# Patient Record
Sex: MALE | Race: WHITE | NOT HISPANIC OR LATINO | Employment: UNEMPLOYED | URBAN - METROPOLITAN AREA
[De-identification: names, ages, dates, MRNs, and addresses within clinical notes are randomized per-mention and may not be internally consistent; named-entity substitution may affect disease eponyms.]

---

## 2017-01-16 DIAGNOSIS — G44.209 TENSION HEADACHE: ICD-10-CM

## 2017-01-16 DIAGNOSIS — M54.2 NECK PAIN: Primary | ICD-10-CM

## 2017-01-17 RX ORDER — BUTALBITAL, ACETAMINOPHEN AND CAFFEINE 50; 325; 40 MG/1; MG/1; MG/1
1 TABLET ORAL EVERY 4 HOURS PRN
Qty: 15 TABLET | Refills: 3
Start: 2017-01-17 | End: 2017-01-23

## 2017-01-23 RX ORDER — BUTALBITAL, ACETAMINOPHEN AND CAFFEINE 50; 325; 40 MG/1; MG/1; MG/1
1 TABLET ORAL EVERY 4 HOURS PRN
Qty: 60 TABLET | Refills: 0 | Status: SHIPPED
Start: 2017-01-23 | End: 2017-07-13

## 2017-03-17 ENCOUNTER — OFFICE VISIT (OUTPATIENT)
Dept: PSYCHIATRY | Facility: CLINIC | Age: 57
End: 2017-03-17
Attending: CLINICAL NURSE SPECIALIST
Payer: MEDICARE

## 2017-03-17 VITALS
WEIGHT: 191 LBS | HEART RATE: 94 BPM | DIASTOLIC BLOOD PRESSURE: 84 MMHG | BODY MASS INDEX: 29.04 KG/M2 | SYSTOLIC BLOOD PRESSURE: 138 MMHG

## 2017-03-17 DIAGNOSIS — F41.1 ANXIETY STATE: Primary | ICD-10-CM

## 2017-03-17 DIAGNOSIS — F34.1 DYSTHYMIA: ICD-10-CM

## 2017-03-17 PROCEDURE — 99212 OFFICE O/P EST SF 10 MIN: CPT | Mod: ZF

## 2017-03-17 RX ORDER — DIAZEPAM 10 MG
10 TABLET ORAL 3 TIMES DAILY
Qty: 90 TABLET | Refills: 5 | Status: SHIPPED | OUTPATIENT
Start: 2017-03-17 | End: 2017-09-15

## 2017-03-17 NOTE — PROGRESS NOTES
"  Outpatient Psychiatry Progress Note     Provider: BRENT Payne CNS  Date: 3/17/2017  Service:  Medication follow up with counseling.   Patient Identification: Lul Arriola  : 1960   MRN: [id number]    Lul Arriola is a 56 year old year old male who presents for ongoing psychiatric care.  Lul Arriola was last seen in clinic on 16.   At that time,   Assessment & Plan      Lul Arriola is seen today for follow up and reports he continues to be very stressed, not really sure that Valium is helping but wants to continue it since concerned he would feel worse. Continues with limited insight into his behavior and affect on health care. Given this the use of Valium is some value in ability to maintain some interactions without danger to the patient.     Diagnosis  Axis 1: Anxiety NOS, Dysthymia  Axis 2: none  Axis 3: See problem list in the medical record     Plan:  Medication: Continue current medications  OTC Recommendations: none  Lab Orders: none  Referrals: none  Release of Information: none  Future Treatment Considerations:per patient agreement to try alternative  Return for Follow Up:6 months      3/17/2017  Today Lul reports he is feeling about the same \" hanging in there\". Didn't get money for  cost but has gotten past that.   He does have some winter blues which happens every year.  Still going through his mother's stuff because she has so much stuff. Mom passed away in .   Side effects of medication include: none reported  Psychiatric Review of Systems:  The patient endorses symptoms of depression: In the last 2 weeks per PHQ 9 several days feeling depressed, fatigue.  He  patient endorses symptoms of anxiety : stable  He endorses symptoms of jose including none.    He endorses symptoms of psychosis including no psychotic symptoms.       Review of Medical Systems:  Sleep: stable  Energy: mild  Concentration: no concerns  Appetite: stable  GI " Concerns: no new concerns  Cardiac concerns: no new concerns  Neurological concerns: no new concerns  Other medical concerns: no new concerns  Current Substance Use:  Alcohol:denies  Other drugs:none  Caffeine:no change - one cup coffee or tea  Nicotine: vaps - quit tobacco about a year ago.  Past Medical History: No past medical history on file.  Patient Active Problem List   Diagnosis     Anxiety state     Dysthymia       Allergies:   Allergies   Allergen Reactions     Ultram [Tramadol] Nausea     Baclofen      Imitrex [Sumatriptan] Nausea     Ivp Dye [Contrast Dye] Nausea and Vomiting     N & V, eyes, nose, throat swelled as a child     Keflex [Cephalexin Hcl]      Remeron Soltab      Valtrex [Valacyclovir] Other (See Comments)     Stomach pains          Current Medications     Current Outpatient Prescriptions Ordered in [facility]   Medication Sig Dispense Refill     butalbital-acetaminophen-caffeine (FIORICET/ESGIC) -40 MG per tablet Take 1 tablet by mouth every 4 hours as needed for pain 60 tablet 0     diazepam (VALIUM) 10 MG tablet Take 1 tablet (10 mg) by mouth 3 times daily 90 tablet 5     cyclobenzaprine (FLEXERIL) 10 MG tablet Take 1 tablet (10 mg) by mouth 3 times daily as needed for muscle spasms 90 tablet 5     fluticasone (FLONASE) 50 MCG/ACT nasal spray Spray 2 sprays into both nostrils every 12 hours 16 g 11     acyclovir (ZOVIRAX) 200 MG capsule Take one po tid 270 capsule 3     methylPREDNISolone (MEDROL DOSEPAK) 4 MG tablet Follow package instructions 21 tablet 0     Aspirin-Acetaminophen-Caffeine (EXCEDRIN PO) Take by mouth as needed       MOTRIN IB PO Take by mouth as needed for moderate pain       sildenafil (VIAGRA) 100 MG tablet Take 0.5-1 tablets ( mg) by mouth daily as needed for erectile dysfunction Take 30 min to 4 hours before intercourse.  Never use with nitroglycerin, terazosin or doxazosin. 12 tablet 11     No current [facility]-ordered facility-administered medications on file.      "    Mental Status Exam     Appearance:  Casually dressed and Well groomed  Behavior/relationship to examiner/demeanor:  Cooperative  Orientation: Oriented to person, place, time and situation  Psychomotor: normal form  Speech Rate:  Normal  Speech Spontaneity:  Normal  Mood:  \"hanging in there\"  Affect:  Appropriate/mood-congruent  Thought Process (Associations):  Goal directed  Thought Content:  no overt psychosis, denies suicidal ideation, intent or thoughts and patient does not appear to be responding to internal stimuli  Abnormal Perception:  None  Attention/Concentration:  Normal  Language:  Intact  Insight:  Adequate  Judgment:  Good      Results     Vital signs: /84  Pulse 94  Wt 86.6 kg (191 lb)  BMI 29.04 kg/m2    Laboratory Data:  no new data reviewed    Assessment & Plan      Lul Arriola is seen today for follow up and reports his mood has been stable with residual depression and anxiety. He would like to continue current medications    Diagnosis  Axis 1: Anxiety, Dysthymia  Axis 2: none  Axis 3: See problem list in the medical record    Plan:  Medication: no change, continue Diazepam at current dose  OTC Recommendations: none  Lab Orders:  none  Referrals: none  Release of Information: none  Future Treatment Considerations:per symptoms, patient agreement to new medication trials  Return for Follow Up:6 months   The risks, benefits, alternatives and side effects have been discussed and are understood by the patient. The patient understands the risks of using street drugs or alcohol. There are no medical contraindications, the patient agrees to treatment, and has the capacity to do so. The patient understands to call 911 or come to the nearest ED if life threatening or urgent symptoms present.  Over 50% of this time was spent counseling the patient and/or coordinating care regarding review of social and occupational functioning.  In addition patient was counseled on health and wellness " practices to augment medication treatment of symptoms. See note for details.    Mary Jane Macario, APRN CNS 3/17/2017

## 2017-03-17 NOTE — MR AVS SNAPSHOT
After Visit Summary   3/17/2017    Lul Arriola    MRN: [id number]           Patient Information     Date Of Birth          1960        Visit Information        Provider Department      3/17/2017 1:15 PM Mary Jane Macario APRN CNS Psychiatry Clinic        Today's Diagnoses     Anxiety state    -  1    Dysthymia           Follow-ups after your visit        Follow-up notes from your care team     Return in about 6 months (around 2017).      Your next 10 appointments already scheduled     Sep 15, 2017  1:15 PM CDT   Adult Med Follow UP with BRENT Payne CNS   Psychiatry Clinic ([facility])    [address]  [address] [facility] [address] [phone]              Who to contact     Please call your clinic at [phone] to:    Ask questions about your health    Make or cancel appointments    Discuss your medicines    Learn about your test results    Speak to your doctor   If you have compliments or concerns about an experience at your clinic, or if you wish to file a complaint, please contact [facility] Patient Relations at [phone] or email us at [email or web address]         Additional Information About Your Visit        MyChart Information     Brainparkt is an electronic gateway that provides easy, online access to your medical records. With [employer], you can request a clinic appointment, read your test results, renew a prescription or communicate with your care team.     To sign up for Brainparkt visit the website at [email or web address]   You will be asked to enter the access code listed below, as well as some personal information. Please follow the directions to create your username and password.     Your access code is: [email or web address]  Expires: 6/15/2017  1:34 PM     Your access code will  in 90 days. If you need help or a new code, please contact your [facility]  [place] [facility] or call [phone] for assistance.        Care EveryWhere ID     This is your Care EveryWhere ID. This could be used by other organizations to access your [place] medical records  [id number]        Your Vitals Were     Pulse BMI (Body Mass Index)                94 29.04 kg/m2           Blood Pressure from Last 3 Encounters:   03/17/17 138/84   09/16/16 151/87   03/25/16 144/88    Weight from Last 3 Encounters:   03/17/17 86.6 kg (191 lb)   09/16/16 86 kg (189 lb 9.6 oz)   03/25/16 79.2 kg (174 lb 9.6 oz)              Today, you had the following     No orders found for display         Where to get your medicines      Some of these will need a paper prescription and others can be bought over the counter.  Ask your nurse if you have questions.     Bring a paper prescription for each of these medications     diazepam 10 MG tablet          Primary Care Provider Office Phone # Fax #    Donald Lorenz [phone]       [facility] [address] [place] [address]        Thank you!     Thank you for choosing PSYCHIATRY CLINIC  for your care. Our goal is always to provide you with excellent care. Hearing back from our patients is one way we can continue to improve our services. Please take a few minutes to complete the written survey that you may receive in the mail after your visit with us. Thank you!             Your Updated Medication List - Protect others around you: Learn how to safely use, store and throw away your medicines at www.disposemymeds.org.          This list is accurate as of: 3/17/17  1:34 PM.  Always use your most recent med list.                   Brand Name Dispense Instructions for use    acyclovir 200 MG capsule    ZOVIRAX    270 capsule    Take one po tid       butalbital-acetaminophen-caffeine -40 MG per tablet    FIORICET/ESGIC    60 tablet    Take 1 tablet by mouth every 4 hours as needed for pain       cyclobenzaprine  10 MG tablet    FLEXERIL    90 tablet    Take 1 tablet (10 mg) by mouth 3 times daily as needed for muscle spasms       diazepam 10 MG tablet    VALIUM    90 tablet    Take 1 tablet (10 mg) by mouth 3 times daily       EXCEDRIN PO      Take by mouth as needed       fluticasone 50 MCG/ACT spray    FLONASE    16 g    Spray 2 sprays into both nostrils every 12 hours       MOTRIN IB PO      Take by mouth as needed for moderate pain

## 2017-03-17 NOTE — NURSING NOTE
Chief Complaint   Patient presents with     Recheck Medication     anxiety      Reviewed allergies, smoking status, and pharmacy preference  Administered abuse screening questions   Obtained weight, blood pressure and heart rate

## 2017-07-13 DIAGNOSIS — J30.9 RHINITIS, ALLERGIC: ICD-10-CM

## 2017-07-13 DIAGNOSIS — G44.209 TENSION HEADACHE: ICD-10-CM

## 2017-07-13 DIAGNOSIS — M54.2 NECK PAIN: ICD-10-CM

## 2017-07-13 RX ORDER — FLUTICASONE PROPIONATE 50 MCG
2 SPRAY, SUSPENSION (ML) NASAL EVERY 12 HOURS
Qty: 16 G | Refills: 0 | Status: SHIPPED | OUTPATIENT
Start: 2017-07-13 | End: 2017-08-16

## 2017-07-13 RX ORDER — BUTALBITAL, ACETAMINOPHEN AND CAFFEINE 50; 325; 40 MG/1; MG/1; MG/1
1 TABLET ORAL EVERY 4 HOURS PRN
Qty: 15 TABLET | Refills: 0
Start: 2017-07-13 | End: 2017-08-16

## 2017-07-13 NOTE — TELEPHONE ENCOUNTER
butalbital-acetaminophen-caffeine (FIORICET/ESGIC) -40 MG per tablet      Last Written Prescription Date:  1/23/2017  Last Fill Quantity: 60,   # refills: 0  Last Office Visit : 3/15/2016  Future Office visit:  none    Routing refill request to provider's nurse per [facility] protocol drug is a controlled substance.  Pended Rx as last filled.

## 2017-07-13 NOTE — TELEPHONE ENCOUNTER
fluticasone (FLONASE) 50 MCG/ACT nasal spray      Last Written Prescription Date:  6/21/2016  Last Fill Quantity: 16 g,   # refills: 11  Last Office Visit : 3/15/2016  Future Office visit:  0    Per protocol, fill sent for 30 days and no refills.  Appointment reminder letter sent.

## 2017-07-13 NOTE — LETTER
[facility]  [address]  [address] [place] [address]      July 13, 2017      Lul Arriola  [address] [address]        Dear Lul,    This letter is a reminder that you are overdue to see your Primary Care Provider for an Annual Visit. You must be seen by your Primary Care Provider on a yearly basis and have appropriate labs drawn for continued care and prescription refills. Please call [phone] to schedule an appointment for an Annual Visit with Dr Donald Lorenz MD.     You have been given a 30 day supply/refill of your fluticasone (FLONASE) 50 MCG/ACT nasal spray while you get your clinic visit/labs completed.      Regards,    Primary Care Center

## 2017-08-16 ENCOUNTER — OFFICE VISIT (OUTPATIENT)
Dept: FAMILY MEDICINE | Facility: CLINIC | Age: 57
End: 2017-08-16

## 2017-08-16 VITALS
OXYGEN SATURATION: 96 % | HEART RATE: 86 BPM | SYSTOLIC BLOOD PRESSURE: 138 MMHG | RESPIRATION RATE: 18 BRPM | BODY MASS INDEX: 28.75 KG/M2 | DIASTOLIC BLOOD PRESSURE: 81 MMHG | WEIGHT: 189.1 LBS

## 2017-08-16 DIAGNOSIS — B00.9 HSV (HERPES SIMPLEX VIRUS) INFECTION: ICD-10-CM

## 2017-08-16 DIAGNOSIS — R61 GENERALIZED HYPERHIDROSIS: ICD-10-CM

## 2017-08-16 DIAGNOSIS — R79.89 ELEVATED BRAIN NATRIURETIC PEPTIDE (BNP) LEVEL: Primary | ICD-10-CM

## 2017-08-16 DIAGNOSIS — I50.20 SYSTOLIC CONGESTIVE HEART FAILURE, UNSPECIFIED CONGESTIVE HEART FAILURE CHRONICITY: ICD-10-CM

## 2017-08-16 DIAGNOSIS — J30.9 CHRONIC ALLERGIC RHINITIS, UNSPECIFIED SEASONALITY, UNSPECIFIED TRIGGER: ICD-10-CM

## 2017-08-16 DIAGNOSIS — G44.209 TENSION HEADACHE: ICD-10-CM

## 2017-08-16 DIAGNOSIS — M54.2 NECK PAIN: ICD-10-CM

## 2017-08-16 DIAGNOSIS — N52.8 OTHER MALE ERECTILE DYSFUNCTION: ICD-10-CM

## 2017-08-16 DIAGNOSIS — R79.89 ELEVATED BRAIN NATRIURETIC PEPTIDE (BNP) LEVEL: ICD-10-CM

## 2017-08-16 LAB
ALBUMIN SERPL-MCNC: 3.7 G/DL (ref 3.4–5)
ALP SERPL-CCNC: 72 U/L (ref 40–150)
ALT SERPL W P-5'-P-CCNC: 23 U/L (ref 0–70)
ANION GAP SERPL CALCULATED.3IONS-SCNC: 9 MMOL/L (ref 3–14)
AST SERPL W P-5'-P-CCNC: 15 U/L (ref 0–45)
BASOPHILS # BLD AUTO: 0 10E9/L (ref 0–0.2)
BASOPHILS NFR BLD AUTO: 0.7 %
BILIRUB SERPL-MCNC: 0.5 MG/DL (ref 0.2–1.3)
BUN SERPL-MCNC: 13 MG/DL (ref 7–30)
CALCIUM SERPL-MCNC: 9.1 MG/DL (ref 8.5–10.1)
CHLORIDE SERPL-SCNC: 107 MMOL/L (ref 94–109)
CHOLEST SERPL-MCNC: 216 MG/DL
CO2 SERPL-SCNC: 23 MMOL/L (ref 20–32)
CREAT SERPL-MCNC: 0.94 MG/DL (ref 0.66–1.25)
DIFFERENTIAL METHOD BLD: NORMAL
EOSINOPHIL # BLD AUTO: 0.1 10E9/L (ref 0–0.7)
EOSINOPHIL NFR BLD AUTO: 1.8 %
ERYTHROCYTE [DISTWIDTH] IN BLOOD BY AUTOMATED COUNT: 12.7 % (ref 10–15)
GFR SERPL CREATININE-BSD FRML MDRD: 83 ML/MIN/1.7M2
GLUCOSE SERPL-MCNC: 113 MG/DL (ref 70–99)
HCT VFR BLD AUTO: 42.1 % (ref 40–53)
HDLC SERPL-MCNC: 46 MG/DL
HGB BLD-MCNC: 13.8 G/DL (ref 13.3–17.7)
IMM GRANULOCYTES # BLD: 0 10E9/L (ref 0–0.4)
IMM GRANULOCYTES NFR BLD: 0.3 %
LDLC SERPL CALC-MCNC: 132 MG/DL
LYMPHOCYTES # BLD AUTO: 1.2 10E9/L (ref 0.8–5.3)
LYMPHOCYTES NFR BLD AUTO: 19.2 %
MCH RBC QN AUTO: 30.7 PG (ref 26.5–33)
MCHC RBC AUTO-ENTMCNC: 32.8 G/DL (ref 31.5–36.5)
MCV RBC AUTO: 94 FL (ref 78–100)
MONOCYTES # BLD AUTO: 0.5 10E9/L (ref 0–1.3)
MONOCYTES NFR BLD AUTO: 8.3 %
NEUTROPHILS # BLD AUTO: 4.3 10E9/L (ref 1.6–8.3)
NEUTROPHILS NFR BLD AUTO: 69.7 %
NONHDLC SERPL-MCNC: 170 MG/DL
NRBC # BLD AUTO: 0 10*3/UL
NRBC BLD AUTO-RTO: 0 /100
NT-PROBNP SERPL-MCNC: 1371 PG/ML (ref 0–125)
PLATELET # BLD AUTO: 204 10E9/L (ref 150–450)
POTASSIUM SERPL-SCNC: 3.8 MMOL/L (ref 3.4–5.3)
PROT SERPL-MCNC: 7.6 G/DL (ref 6.8–8.8)
RBC # BLD AUTO: 4.49 10E12/L (ref 4.4–5.9)
SODIUM SERPL-SCNC: 138 MMOL/L (ref 133–144)
TRIGL SERPL-MCNC: 190 MG/DL
TSH SERPL DL<=0.005 MIU/L-ACNC: 2.39 MU/L (ref 0.4–4)
WBC # BLD AUTO: 6.2 10E9/L (ref 4–11)

## 2017-08-16 RX ORDER — FLUTICASONE PROPIONATE 50 MCG
2 SPRAY, SUSPENSION (ML) NASAL EVERY 12 HOURS
Qty: 16 G | Refills: 5 | Status: SHIPPED | OUTPATIENT
Start: 2017-08-16 | End: 2018-11-28

## 2017-08-16 RX ORDER — BUTALBITAL, ACETAMINOPHEN AND CAFFEINE 50; 325; 40 MG/1; MG/1; MG/1
1 TABLET ORAL EVERY 4 HOURS PRN
Qty: 15 TABLET | Refills: 3 | Status: SHIPPED | OUTPATIENT
Start: 2017-08-16 | End: 2017-12-01

## 2017-08-16 RX ORDER — SILDENAFIL 100 MG/1
50-100 TABLET, FILM COATED ORAL DAILY PRN
Qty: 12 TABLET | Refills: 11 | Status: SHIPPED | OUTPATIENT
Start: 2017-08-16 | End: 2018-11-28

## 2017-08-16 RX ORDER — ACYCLOVIR 200 MG/1
CAPSULE ORAL
Qty: 270 CAPSULE | Refills: 3 | Status: SHIPPED | OUTPATIENT
Start: 2017-08-16 | End: 2018-11-28

## 2017-08-16 RX ORDER — BUTALBITAL, ACETAMINOPHEN AND CAFFEINE 50; 325; 40 MG/1; MG/1; MG/1
1 TABLET ORAL EVERY 4 HOURS PRN
Qty: 15 TABLET | Refills: 0 | Status: SHIPPED | OUTPATIENT
Start: 2017-08-16 | End: 2017-08-16

## 2017-08-16 RX ORDER — FLUTICASONE PROPIONATE 50 MCG
2 SPRAY, SUSPENSION (ML) NASAL EVERY 12 HOURS
Qty: 16 G | Refills: 0 | Status: SHIPPED | OUTPATIENT
Start: 2017-08-16 | End: 2017-08-16

## 2017-08-16 RX ORDER — CYCLOBENZAPRINE HCL 10 MG
10 TABLET ORAL 3 TIMES DAILY PRN
Qty: 90 TABLET | Refills: 5 | Status: SHIPPED | OUTPATIENT
Start: 2017-08-16 | End: 2018-11-28

## 2017-08-16 ASSESSMENT — PAIN SCALES - GENERAL: PAINLEVEL: MODERATE PAIN (4)

## 2017-08-16 NOTE — PATIENT INSTRUCTIONS
[facility] Medication Refill Request Information:  * Please contact your pharmacy regarding ANY request for medication refills.  ** [facility] Prescription Fax = [phone]  * Please allow 3 business days for routine medication refills.  * Please allow 5 business days for controlled substance medication refills.     [facility] Test Result notification information:  *You will be notified with in 7-10 days of your appointment day regarding the results of your test.  If you are on MyChart you will be notified as soon as the provider has reviewed the results and signed off on them.    [facility] [phone]

## 2017-08-16 NOTE — MR AVS SNAPSHOT
After Visit Summary   8/16/2017    Lul Arriola    MRN: [id number]           Patient Information     Date Of Birth          1960        Visit Information        Provider Department      8/16/2017 1:35 PM Donald Lorenz MD [facility]        Today's Diagnoses     Elevated brain natriuretic peptide (BNP) level    -  1    Generalized hyperhidrosis        Systolic congestive heart failure, unspecified congestive heart failure chronicity (H)        HSV (herpes simplex virus) infection        Neck pain        Chronic allergic rhinitis, unspecified seasonality, unspecified trigger        Tension headache        Other male erectile dysfunction          Care Instructions    [facility] Medication Refill Request Information:  * Please contact your pharmacy regarding ANY request for medication refills.  ** [facility] Prescription Fax = [phone]  * Please allow 3 business days for routine medication refills.  * Please allow 5 business days for controlled substance medication refills.     [facility] Test Result notification information:  *You will be notified with in 7-10 days of your appointment day regarding the results of your test.  If you are on MyChart you will be notified as soon as the provider has reviewed the results and signed off on them.    Primary Care Center [phone]             Follow-ups after your visit        Additional Services     CARDIOLOGY EVAL ADULT REFERRAL       Your provider has referred you to:  [facility]: [facility] [phone]   [email or web address]    Please be aware that coverage of these services is subject to the terms and limitations of your health insurance plan.  Call member services at your health plan with any benefit or coverage questions.      Type of Referral:  New Cardiology Consult    Timeframe requested:  Within 1  month    Please bring the following to your appointment:  >>   Any x-rays, CTs or MRIs which have been performed.  Contact the facility where they were done to arrange for  prior to your scheduled appointment.    >>   List of current medications  >>   This referral request   >>   Any documents/labs given to you for this referral                  Follow-up notes from your care team     Return in about 6 months (around 2/16/2018).      Your next 10 appointments already scheduled     Aug 16, 2017  2:15 PM CDT   LAB with [facility] ([facility])    [address] [address] [phone]           Patient must bring picture ID. Patient should be prepared to give a urine specimen  Please do not eat 10-12 hours before your appointment if you are coming in fasting for labs on lipids, cholesterol, or glucose (sugar). Pregnant women should follow their Care Team instructions. Water with medications is okay. Do not drink coffee or other fluids. If you have concerns about taking  your medications, please ask at office or if scheduling via Qbaka, send a message by clicking on Secure Messaging, Message Your Care Team.            Sep 15, 2017  1:15 PM CDT   Adult Med Follow UP with BRENT Payne CNS   Psychiatry Clinic ([facility])    [place] [id number] [facility] [address] [phone]              Future tests that were ordered for you today     Open Future Orders        Priority Expected Expires Ordered    CBC with platelets differential Routine 8/16/2017 8/30/2017 8/16/2017    Comprehensive metabolic panel Routine 8/16/2017 8/30/2017 8/16/2017    Lipid panel reflex to direct LDL Routine 8/16/2017 8/30/2017 8/16/2017    TSH with free T4 reflex Routine 8/16/2017 8/30/2017 8/16/2017    N terminal pro BNP Routine 8/16/2017 8/16/2018 8/16/2017    Echocardiogram Routine  8/16/2018  2017            Who to contact     Please call your clinic at [phone] to:    Ask questions about your health    Make or cancel appointments    Discuss your medicines    Learn about your test results    Speak to your doctor   If you have compliments or concerns about an experience at your clinic, or if you wish to file a complaint, please contact [facility] Patient Relations at [phone] or email us at [email or web address]         Additional Information About Your Visit        [employer] Information     PinMyPet is an electronic gateway that provides easy, online access to your medical records. With PinMyPet, you can request a clinic appointment, read your test results, renew a prescription or communicate with your care team.     To sign up for PinMyPet visit the website at [email or web address]   You will be asked to enter the access code listed below, as well as some personal information. Please follow the directions to create your username and password.     Your access code is: [email or web address]  Expires: 2017  6:31 AM     Your access code will  in 90 days. If you need help or a new code, please contact your [facility] or call [phone] for assistance.        Care EveryWhere ID     This is your Care EveryWhere ID. This could be used by other organizations to access your [place] medical records  [id number]        Your Vitals Were     Pulse Respirations Pulse Oximetry BMI (Body Mass Index)          86 18 96% 28.75 kg/m2         Blood Pressure from Last 3 Encounters:   17 138/81   16 145/84   06/19/15 137/79    Weight from Last 3 Encounters:   17 85.8 kg (189 lb 1.6 oz)   16 76.9 kg (169 lb 8 oz)   06/19/15 76.4 kg (168 lb 6.4 oz)              We Performed the Following     CARDIOLOGY EVAL ADULT REFERRAL          Today's Medication Changes          These changes are accurate as of: 17  2:03 PM.   If you have any questions, ask your nurse or doctor.               Start taking these medicines.        Dose/Directions    butalbital-acetaminophen-caffeine -40 MG per tablet   Commonly known as:  FIORICET/ESGIC   Used for:  Neck pain, Tension headache   Started by:  Donald Lorenz MD        Dose:  1 tablet   Take 1 tablet by mouth every 4 hours as needed for pain . Patient needs to see primary provider for further refills.   Quantity:  15 tablet   Refills:  3       fluticasone 50 MCG/ACT spray   Commonly known as:  FLONASE   Used for:  Chronic allergic rhinitis, unspecified seasonality, unspecified trigger   Started by:  Donald Lorenz MD        Dose:  2 spray   Spray 2 sprays into both nostrils every 12 hours   Quantity:  16 g   Refills:  5       sildenafil 100 MG cap/tab   Commonly known as:  REVATIO/VIAGRA   Used for:  Other male erectile dysfunction   Started by:  Donald Lorenz MD        Dose:   mg   Take 0.5-1 tablets ( mg) by mouth daily as needed for erectile dysfunction Take 30 min to 4 hours before intercourse.  Never use with nitroglycerin, terazosin or doxazosin.   Quantity:  12 tablet   Refills:  11            Where to get your medicines      Some of these will need a paper prescription and others can be bought over the counter.  Ask your nurse if you have questions.     Bring a paper prescription for each of these medications     acyclovir 200 MG capsule    butalbital-acetaminophen-caffeine -40 MG per tablet    cyclobenzaprine 10 MG tablet    fluticasone 50 MCG/ACT spray    sildenafil 100 MG cap/tab                Primary Care Provider Office Phone # Fax #    Donald Lorenz [phone]       [address] [address]        Equal Access to Services     [facility] : Rupinder Rios, francisco reddy, karine jackson. [facility] [phone].    ATENCIÓN: Si  makayla torres, tiene a daniel disposición servicios gratuitos de asistencia lingüística. Anisha rockwell [phone].    We comply with applicable federal civil rights laws and [place] laws. We do not discriminate on the basis of race, color, national origin, age, disability sex, sexual orientation or gender identity.            Thank you!     Thank you for choosing [facility]  for your care. Our goal is always to provide you with excellent care. Hearing back from our patients is one way we can continue to improve our services. Please take a few minutes to complete the written survey that you may receive in the mail after your visit with us. Thank you!             Your Updated Medication List - Protect others around you: Learn how to safely use, store and throw away your medicines at www.disposemymeds.org.          This list is accurate as of: 8/16/17  2:03 PM.  Always use your most recent med list.                   Brand Name Dispense Instructions for use Diagnosis    acyclovir 200 MG capsule    ZOVIRAX    270 capsule    Take one po tid    HSV (herpes simplex virus) infection       butalbital-acetaminophen-caffeine -40 MG per tablet    FIORICET/ESGIC    15 tablet    Take 1 tablet by mouth every 4 hours as needed for pain . Patient needs to see primary provider for further refills.    Neck pain, Tension headache       cyclobenzaprine 10 MG tablet    FLEXERIL    90 tablet    Take 1 tablet (10 mg) by mouth 3 times daily as needed for muscle spasms    Neck pain       diazepam 10 MG tablet    VALIUM    90 tablet    Take 1 tablet (10 mg) by mouth 3 times daily    Anxiety state       EXCEDRIN PO      Take by mouth as needed        fluticasone 50 MCG/ACT spray    FLONASE    16 g    Spray 2 sprays into both nostrils every 12 hours    Chronic allergic rhinitis, unspecified seasonality, unspecified trigger       MOTRIN IB PO      Take by mouth as needed for moderate pain        sildenafil 100 MG cap/tab     REVATIO/VIAGRA    12 tablet    Take 0.5-1 tablets ( mg) by mouth daily as needed for erectile dysfunction Take 30 min to 4 hours before intercourse.  Never use with nitroglycerin, terazosin or doxazosin.    Other male erectile dysfunction

## 2017-08-16 NOTE — NURSING NOTE
Chief Complaint   Patient presents with     Medication Follow-up     Patient is here to follow up on medication.      Julee Flores LPN at 1:17 PM on 8/16/2017.

## 2017-08-30 ENCOUNTER — TELEPHONE (OUTPATIENT)
Dept: INTERNAL MEDICINE | Facility: CLINIC | Age: 57
End: 2017-08-30

## 2017-08-30 NOTE — TELEPHONE ENCOUNTER
Form was signed by Dr. Lorenz and mailed to home address.  Pt needs to sign and send it to Kevin.  --------------------------------------      ----- Message from Donald Lorenz MD sent at 8/16/2017  1:36 PM CDT -----  Hi, he'll do cologuard for routine test, when you come back can you help him set it up?

## 2017-09-06 NOTE — PROGRESS NOTES
SUBJECTIVE:    Pt is a 56 year old male with pmh of     Patient Active Problem List   Diagnosis     Anxiety state     Dysthymia       who is here for evaluation of had concerns including Medication Follow-up.    Here for f/u.  One, in [place] last year told CHF--has not done f/u with cardiology, not having sx of dyspnea, edema. Will check studies and refer to heart MD here.  Two, chronic rhinits, uses Flonase, helps and tolerated, will refill.  Three, HSV, well supressed w/ well tolerated zovirax po. Tolerated well, would like to continue.  Four, ED, Viagra works well, tolerated.  Five, chronic neck pain and HA, Flexeril and Fioricet used, helpful and tolerated. He is aware and we again discuss to mimimize meds, not accelerating use.  Six, anxiety, uses Valium, sees psychiatry here    Allergies   Allergen Reactions     Ultram [Tramadol] Nausea     Baclofen      Imitrex [Sumatriptan] Nausea     Ivp Dye [Contrast Dye] Nausea and Vomiting     N & V, eyes, nose, throat swelled as a child     Keflex [Cephalexin Hcl]      Remeron Soltab      Valtrex [Valacyclovir] Other (See Comments)     Stomach pains           Current Outpatient Prescriptions   Medication Sig Dispense Refill     acyclovir (ZOVIRAX) 200 MG capsule Take one po tid 270 capsule 3     cyclobenzaprine (FLEXERIL) 10 MG tablet Take 1 tablet (10 mg) by mouth 3 times daily as needed for muscle spasms 90 tablet 5     sildenafil (REVATIO/VIAGRA) 100 MG cap/tab Take 0.5-1 tablets ( mg) by mouth daily as needed for erectile dysfunction Take 30 min to 4 hours before intercourse.  Never use with nitroglycerin, terazosin or doxazosin. 12 tablet 11     fluticasone (FLONASE) 50 MCG/ACT spray Spray 2 sprays into both nostrils every 12 hours 16 g 5     butalbital-acetaminophen-caffeine (FIORICET/ESGIC) -40 MG per tablet Take 1 tablet by mouth every 4 hours as needed for pain . Patient needs to see primary provider for further refills. 15 tablet 3     diazepam  (VALIUM) 10 MG tablet Take 1 tablet (10 mg) by mouth 3 times daily 90 tablet 5     Aspirin-Acetaminophen-Caffeine (EXCEDRIN PO) Take by mouth as needed       MOTRIN IB PO Take by mouth as needed for moderate pain         Social History   Substance Use Topics     Smoking status: Former Smoker     Packs/day: 0.50     Years: 30.00     Types: Cigarettes     Smokeless tobacco: Never Used     Alcohol use No           OBJECTIVE:  /81  Pulse 86  Resp 18  Wt 85.8 kg (189 lb 1.6 oz)  SpO2 96%  BMI 28.75 kg/m2  GENERAL APPEARANCE: Alert, no acute distress  ENT: NC/AT, nontender   NECK: No adenopathy,masses or thyromegaly  RESP: lungs clear to auscultation   CV: normal rate, regular rhythm, no murmur or gallop  MS: extremities normal, no peripheral edema  NEURO: Alert, oriented, speech and mentation normal  PSYCHE: mentation appears normal, affect and mood normal    ASSESSMENT/PLAN:    HA: cont as is, will look for ways to reduce med use  Anxiety: cont as is, sees psychiatry  CHF: see cardiology after ordered studies  Rhinitis: cont Flonase  ED: cont prn Viagra  HSV: cont Zovirax  We agree he'll return in six mo, review shots/colon/PSA then    PATRICIA BAUM MD

## 2017-09-15 ENCOUNTER — OFFICE VISIT (OUTPATIENT)
Dept: PSYCHIATRY | Facility: CLINIC | Age: 57
End: 2017-09-15
Attending: CLINICAL NURSE SPECIALIST
Payer: MEDICARE

## 2017-09-15 VITALS
DIASTOLIC BLOOD PRESSURE: 83 MMHG | WEIGHT: 187.6 LBS | BODY MASS INDEX: 28.52 KG/M2 | HEART RATE: 97 BPM | SYSTOLIC BLOOD PRESSURE: 123 MMHG

## 2017-09-15 DIAGNOSIS — F34.1 DYSTHYMIA: ICD-10-CM

## 2017-09-15 DIAGNOSIS — F41.1 ANXIETY STATE: Primary | ICD-10-CM

## 2017-09-15 PROCEDURE — 99212 OFFICE O/P EST SF 10 MIN: CPT | Mod: ZF

## 2017-09-15 RX ORDER — DIAZEPAM 10 MG
10 TABLET ORAL 3 TIMES DAILY
Qty: 90 TABLET | Refills: 5 | Status: SHIPPED | OUTPATIENT
Start: 2017-09-15 | End: 2018-03-09

## 2017-09-15 NOTE — PROGRESS NOTES
Outpatient Psychiatry Progress Note     Provider: BRENT Payne CNS  Date: 9/15/2017  Service:  Medication follow up with counseling.   Patient Identification: Lul Arriola  : 1960   MRN: [id number]    Lul Arriola is a 56 year old year old male who presents for ongoing psychiatric care.  Lul Arriola was last seen in clinic on 3/17/17.   At that time,   Assessment & Plan       Lul Arriola is seen today for follow up and reports his mood has been stable with residual depression and anxiety. He would like to continue current medications     Diagnosis  Axis 1: Anxiety, Dysthymia  Axis 2: none  Axis 3: See problem list in the medical record     Plan:  Medication: no change, continue Diazepam at current dose  OTC Recommendations: none  Lab Orders:  none  Referrals: none  Release of Information: none  Future Treatment Considerations:per symptoms, patient agreement to new medication trials  Return for Follow Up:6 months      ____________________________________________________________________________________________________________________________________________    09/15/2017  Today Lul reports he is fair.  Has extreme fatigue which might be due to CHF. He only showers 2 times a week.  PCP discussed PT but he doesn't think he could do this.   Side effects of medication include: none reported with Diazepam  Psychiatric Review of Systems:  The patient endorses symptoms of depression: In the last 2 weeks per PHQ 9 several days sleep disturbance.  More than 1/2 days fatigue.  He  patient endorses symptoms of anxiety : worried about the economy.  He endorses symptoms of jose including none.    He endorses symptoms of psychosis including no psychotic symptoms.       Review of Medical Systems:  Sleep: mild  Energy: moderate  Concentration: no concerns  Appetite: no concerns  GI Concerns: denies  Cardiac concerns: continued CHF  Neurological concerns: no new concerns  Other  medical concerns: no new concerns  Current Substance Use:  Alcohol:denies  Other drugs:denies  Caffeine:a couple cups coffee or tea  Nicotine: none  Past Medical History: No past medical history on file.  Patient Active Problem List   Diagnosis     Anxiety state     Dysthymia       Allergies:   Allergies   Allergen Reactions     Ultram [Tramadol] Nausea     Baclofen      Imitrex [Sumatriptan] Nausea     Ivp Dye [Contrast Dye] Nausea and Vomiting     N & V, eyes, nose, throat swelled as a child     Keflex [Cephalexin Hcl]      Remeron Soltab      Valtrex [Valacyclovir] Other (See Comments)     Stomach pains          Current Medications     Current Outpatient Prescriptions Ordered in [facility]   Medication Sig Dispense Refill     acyclovir (ZOVIRAX) 200 MG capsule Take one po tid 270 capsule 3     cyclobenzaprine (FLEXERIL) 10 MG tablet Take 1 tablet (10 mg) by mouth 3 times daily as needed for muscle spasms 90 tablet 5     sildenafil (REVATIO/VIAGRA) 100 MG cap/tab Take 0.5-1 tablets ( mg) by mouth daily as needed for erectile dysfunction Take 30 min to 4 hours before intercourse.  Never use with nitroglycerin, terazosin or doxazosin. 12 tablet 11     fluticasone (FLONASE) 50 MCG/ACT spray Spray 2 sprays into both nostrils every 12 hours 16 g 5     butalbital-acetaminophen-caffeine (FIORICET/ESGIC) -40 MG per tablet Take 1 tablet by mouth every 4 hours as needed for pain . Patient needs to see primary provider for further refills. 15 tablet 3     diazepam (VALIUM) 10 MG tablet Take 1 tablet (10 mg) by mouth 3 times daily 90 tablet 5     Aspirin-Acetaminophen-Caffeine (EXCEDRIN PO) Take by mouth as needed       MOTRIN IB PO Take by mouth as needed for moderate pain       No current Epic-ordered facility-administered medications on file.         Mental Status Exam     Appearance:  Casually dressed and Well groomed  Behavior/relationship to examiner/demeanor:  Cooperative  Orientation: Oriented to person, place,  time and situation  Psychomotor: normal form  Speech Rate:  Normal  Speech Spontaneity:  Normal  Mood:  normal  Affect:  Appropriate/mood-congruent and somatic focus  Thought Process (Associations):  Goal directed  Thought Content:  no overt psychosis, denies suicidal ideation, intent or thoughts and patient does not appear to be responding to internal stimuli  Abnormal Perception:  None  Attention/Concentration:  Normal  Language:  Intact  Insight:  Adequate  Judgment:  Adequate for safety      Results     Vital signs: /83  Pulse 97  Wt 85.1 kg (187 lb 9.6 oz)  BMI 28.52 kg/m2    Laboratory Data:  reviewed data from other providers. None affecting psychotropic medication.     Assessment & Plan      Lul Arriola is seen today for follow up and reports he has been fatigued due to health problems which makes it difficult for him to have much interest or energy in things.  He would like to continue current medication and is concerned about side effects of trying anything new.    Diagnosis   Anxiety, Dysthymia    Plan:  Medication: Continue current medications  OTC Recommendations: none  Lab Orders:  none  Referrals: none  Release of Information: none  Future Treatment Considerations:per patient  Return for Follow Up:6 months   The risks, benefits, alternatives and side effects have been discussed and are understood by the patient. The patient understands the risks of using street drugs or alcohol. There are no medical contraindications, the patient agrees to treatment, and has the capacity to do so. The patient understands to call 911 or come to the nearest ED if life threatening or urgent symptoms present.  Over 50% of this time was spent counseling the patient and/or coordinating care regarding review of social and occupational functioning.  In addition patient was counseled on health and wellness practices to augment medication treatment of symptoms. See note for details.    Mary Jane Macario, APRN  CNS 9/15/2017

## 2017-09-15 NOTE — MR AVS SNAPSHOT
After Visit Summary   9/15/2017    Lul Arriola    MRN: [id number]           Patient Information     Date Of Birth          1960        Visit Information        Provider Department      9/15/2017 1:15 PM Mary Jane Macario APRN [facility] Psych[facility]ry [facility]        Today's Diagnoses     Anxiety state    -  1    Dysthymia           Follow-ups after your visit        Follow-up notes from your care team     Return in about 6 months (around 3/15/2018).      Your next 10 appointments already scheduled     Mar 09, 2018  1:15 PM CST   Adult Med Follow UP with BRENT Payne CNS   Psychiatry Clinic ([facility])    [address]  [address] [facility] [address] [phone]              Who to contact     Please call your clinic at [phone] to:    Ask questions about your health    Make or cancel appointments    Discuss your medicines    Learn about your test results    Speak to your doctor   If you have compliments or concerns about an experience at your clinic, or if you wish to file a complaint, please contact [facility] Patient Relations at [phone] or email us at [email or web address]         Additional Information About Your Visit        MyChart Information     [email or web address] is an electronic gateway that provides easy, online access to your medical records. With "[employer] you can request a clinic appointment, read your test results, renew a prescription or communicate with your care team.     To sign up for [email or web address] visit the website at [email or web address]   You will be asked to enter the access code listed below, as well as some personal information. Please follow the directions to create your username and password.     Your access code is: [email or web address]  Expires: 2017  6:31 AM     Your access code will  in 90 days. If you need help or a new code, please contact your [facility]  [place] [facility] or call [phone] for assistance.        Care EveryWhere ID     This is your Care EveryWhere ID. This could be used by other organizations to access your [place] medical records  [id number]        Your Vitals Were     Pulse BMI (Body Mass Index)                97 28.52 kg/m2           Blood Pressure from Last 3 Encounters:   09/15/17 123/83   08/16/17 138/81   03/17/17 138/84    Weight from Last 3 Encounters:   09/15/17 85.1 kg (187 lb 9.6 oz)   08/16/17 85.8 kg (189 lb 1.6 oz)   03/17/17 86.6 kg (191 lb)              Today, you had the following     No orders found for display         Where to get your medicines      Some of these will need a paper prescription and others can be bought over the counter.  Ask your nurse if you have questions.     Bring a paper prescription for each of these medications     diazepam 10 MG tablet          Primary Care Provider Office Phone # Fax #    Donald Lorenz [phone]       [address] [address]        Equal Access to Services     [facility] : Hadii lars ramirez Soameena, waaxda barry, qaybta karine waterman. So [facility] [phone].    ATENCIÓN: Si habla español, tiene a daniel disposición servicios gratuitos de asistencia lingüística. Anisha al [phone].    We comply with applicable federal civil rights laws and [place] laws. We do not discriminate on the basis of race, color, national origin, age, disability, sex, sexual orientation, or gender identity.            Thank you!     Thank you for choosing PSYCHIATRY CLINIC  for your care. Our goal is always to provide you with excellent care. Hearing back from our patients is one way we can continue to improve our services. Please take a few minutes to complete the written survey that you may receive in the mail after your visit with us. Thank you!             Your Updated Medication List - Protect  others around you: Learn how to safely use, store and throw away your medicines at www.disposemymeds.org.          This list is accurate as of: 9/15/17 11:59 PM.  Always use your most recent med list.                   Brand Name Dispense Instructions for use Diagnosis    acyclovir 200 MG capsule    ZOVIRAX    270 capsule    Take one po tid    HSV (herpes simplex virus) infection       butalbital-acetaminophen-caffeine -40 MG per tablet    FIORICET/ESGIC    15 tablet    Take 1 tablet by mouth every 4 hours as needed for pain . Patient needs to see primary provider for further refills.    Neck pain, Tension headache       cyclobenzaprine 10 MG tablet    FLEXERIL    90 tablet    Take 1 tablet (10 mg) by mouth 3 times daily as needed for muscle spasms    Neck pain       diazepam 10 MG tablet    VALIUM    90 tablet    Take 1 tablet (10 mg) by mouth 3 times daily    Anxiety state       EXCEDRIN PO      Take by mouth as needed        fluticasone 50 MCG/ACT spray    FLONASE    16 g    Spray 2 sprays into both nostrils every 12 hours    Chronic allergic rhinitis, unspecified seasonality, unspecified trigger       MOTRIN IB PO      Take by mouth as needed for moderate pain        sildenafil 100 MG tablet    VIAGRA    12 tablet    Take 0.5-1 tablets ( mg) by mouth daily as needed for erectile dysfunction Take 30 min to 4 hours before intercourse.  Never use with nitroglycerin, terazosin or doxazosin.    Other male erectile dysfunction

## 2017-09-15 NOTE — NURSING NOTE
Chief Complaint   Patient presents with     Recheck Medication     anxiety     Reviewed allergies, smoking status, and pharmacy preference    Obtained weight, blood pressure and heart rate

## 2017-10-16 ENCOUNTER — TRANSFERRED RECORDS (OUTPATIENT)
Dept: HEALTH INFORMATION MANAGEMENT | Facility: CLINIC | Age: 57
End: 2017-10-16

## 2017-11-08 ENCOUNTER — TELEPHONE (OUTPATIENT)
Dept: INTERNAL MEDICINE | Facility: CLINIC | Age: 57
End: 2017-11-08

## 2017-11-08 NOTE — TELEPHONE ENCOUNTER
Cologuard result was positive.  Pt was informed the result. He states that blood in stool is normal to him because he has been having blood in stool for 20 years due to hemorrhoids and he is not interested in having colonoscopy at this time. But he will discuss about colonoscopy at next appt with Dr. Lorenz.

## 2017-12-01 ENCOUNTER — OFFICE VISIT (OUTPATIENT)
Dept: FAMILY MEDICINE | Facility: CLINIC | Age: 57
End: 2017-12-01

## 2017-12-01 ENCOUNTER — RADIANT APPOINTMENT (OUTPATIENT)
Dept: CARDIOLOGY | Facility: CLINIC | Age: 57
End: 2017-12-01

## 2017-12-01 ENCOUNTER — OFFICE VISIT (OUTPATIENT)
Dept: CARDIOLOGY | Facility: CLINIC | Age: 57
End: 2017-12-01
Attending: INTERNAL MEDICINE
Payer: MEDICARE

## 2017-12-01 ENCOUNTER — TELEPHONE (OUTPATIENT)
Dept: INTERNAL MEDICINE | Facility: CLINIC | Age: 57
End: 2017-12-01

## 2017-12-01 VITALS
HEART RATE: 108 BPM | WEIGHT: 192 LBS | SYSTOLIC BLOOD PRESSURE: 141 MMHG | DIASTOLIC BLOOD PRESSURE: 91 MMHG | RESPIRATION RATE: 18 BRPM | BODY MASS INDEX: 29.19 KG/M2

## 2017-12-01 VITALS
HEART RATE: 108 BPM | WEIGHT: 192 LBS | OXYGEN SATURATION: 98 % | BODY MASS INDEX: 26.88 KG/M2 | DIASTOLIC BLOOD PRESSURE: 84 MMHG | SYSTOLIC BLOOD PRESSURE: 135 MMHG | HEIGHT: 71 IN

## 2017-12-01 DIAGNOSIS — G44.209 TENSION HEADACHE: ICD-10-CM

## 2017-12-01 DIAGNOSIS — I50.20 SYSTOLIC CONGESTIVE HEART FAILURE, UNSPECIFIED CONGESTIVE HEART FAILURE CHRONICITY: ICD-10-CM

## 2017-12-01 DIAGNOSIS — M54.2 NECK PAIN: ICD-10-CM

## 2017-12-01 DIAGNOSIS — I10 BENIGN ESSENTIAL HYPERTENSION: Primary | ICD-10-CM

## 2017-12-01 DIAGNOSIS — R06.02 SOB (SHORTNESS OF BREATH): ICD-10-CM

## 2017-12-01 DIAGNOSIS — Z12.11 SCREEN FOR COLON CANCER: Primary | ICD-10-CM

## 2017-12-01 PROCEDURE — 99213 OFFICE O/P EST LOW 20 MIN: CPT | Mod: ZF

## 2017-12-01 PROCEDURE — 99205 OFFICE O/P NEW HI 60 MIN: CPT | Mod: GC | Performed by: INTERNAL MEDICINE

## 2017-12-01 RX ORDER — FUROSEMIDE 20 MG
20 TABLET ORAL DAILY
Qty: 90 TABLET | Refills: 3 | COMMUNITY
Start: 2017-12-01 | End: 2018-12-04

## 2017-12-01 RX ORDER — BUTALBITAL, ACETAMINOPHEN AND CAFFEINE 50; 325; 40 MG/1; MG/1; MG/1
1 TABLET ORAL EVERY 4 HOURS PRN
Qty: 15 TABLET | Refills: 5 | Status: SHIPPED | OUTPATIENT
Start: 2017-12-01 | End: 2018-05-09

## 2017-12-01 RX ORDER — LISINOPRIL 5 MG/1
5 TABLET ORAL DAILY
Qty: 90 TABLET | Refills: 3 | COMMUNITY
Start: 2017-12-01 | End: 2018-06-15

## 2017-12-01 RX ADMIN — Medication 6 ML: at 14:43

## 2017-12-01 ASSESSMENT — PAIN SCALES - GENERAL
PAINLEVEL: NO PAIN (0)
PAINLEVEL: NO PAIN (0)

## 2017-12-01 NOTE — PATIENT INSTRUCTIONS
Please start taking Lisinopril 5 MG once daily.    Please start taking Lasix 20 MG once daily.    Please have some labs drawn early next week and fax results to Dr. Blanco at [phone].    Thank you for your visit today.  Please call me with any questions or concerns.   Grzegorz Méndez RN  Cardiology Care Coordinator  [phone], press option 1 then option 3

## 2017-12-01 NOTE — MR AVS SNAPSHOT
After Visit Summary   12/1/2017    Lul Arriola    MRN: [id number]           Patient Information     Date Of Birth          1960        Visit Information        Provider Department      12/1/2017 1:05 PM Donald Lorenz MD [facility]        Today's Diagnoses     Neck pain        Tension headache          Care Instructions    Primary Care Center Medication Refill Request Information:  * Please contact your pharmacy regarding ANY request for medication refills.  ** [facility] Prescription Fax = [phone]  * Please allow 3 business days for routine medication refills.  * Please allow 5 business days for controlled substance medication refills.     Primary Care Center Test Result notification information:  *You will be notified with in 7-10 days of your appointment day regarding the results of your test.  If you are on MyChart you will be notified as soon as the provider has reviewed the results and signed off on them.    [facility] [phone]             Follow-ups after your visit        Follow-up notes from your care team     Return in about 6 months (around 6/1/2018).      Your next 10 appointments already scheduled     Dec 01, 2017  3:00 PM CST   (Arrive by 2:45 PM)   NEW HEART FAILURE with Axel Blanco MD   [facility] ([facility])    [address] [id number] [phone]            Mar 09, 2018  1:15 PM CST   Adult Med Follow UP with BRENT Payne CNS   Psychiatry Clinic ([facility])    [address] [facility] [id number] [phone]              Who to contact     Please call your clinic at [phone] to:    Ask questions about your health    Make or cancel appointments    Discuss your medicines    Learn about your test results    Speak to your doctor   If you have compliments or concerns about an experience  at your clinic, or if you wish to file a complaint, please contact [facility] Patient Relations at [phone] or email us at [email or web address]         Additional Information About Your Visit        Riskalyzehart Information     [employer] is an electronic gateway that provides easy, online access to your medical records. With [employer], you can request a clinic appointment, read your test results, renew a prescription or communicate with your care team.     To sign up for [employer] visit the website at [email or web address]   You will be asked to enter the access code listed below, as well as some personal information. Please follow the directions to create your username and password.     Your access code is: [email or web address]  Expires: 2/15/2018  6:31 AM     Your access code will  in 90 days. If you need help or a new code, please contact your [facility] or call [phone] for assistance.        Care EveryWhere ID     This is your Care EveryWhere ID. This could be used by other organizations to access your [place] medical records  [id number]        Your Vitals Were     Pulse Respirations BMI (Body Mass Index)             108 18 29.19 kg/m2          Blood Pressure from Last 3 Encounters:   17 (!) 141/91   09/15/17 123/83   17 138/81    Weight from Last 3 Encounters:   17 87.1 kg (192 lb)   09/15/17 85.1 kg (187 lb 9.6 oz)   17 85.8 kg (189 lb 1.6 oz)              Today, you had the following     No orders found for display         Today's Medication Changes          These changes are accurate as of: 17  2:44 PM.  If you have any questions, ask your nurse or doctor.               These medicines have changed or have updated prescriptions.        Dose/Directions    butalbital-acetaminophen-caffeine -40 MG per tablet   Commonly known as:  FIORICET/ESGIC   This may have changed:  additional instructions   Used  for:  Neck pain, Tension headache   Changed by:  Donald Lorenz MD        Dose:  1 tablet   Take 1 tablet by mouth every 4 hours as needed for pain   Quantity:  15 tablet   Refills:  5            Where to get your medicines      Some of these will need a paper prescription and others can be bought over the counter.  Ask your nurse if you have questions.     Bring a paper prescription for each of these medications     butalbital-acetaminophen-caffeine -40 MG per tablet                Primary Care Provider Office Phone # Fax #    Donald Lorenz [phone]       [address] [address]        Equal Access to Services     [facility]: Hadii aad ku hadasho Soomaali, waaxda luqadaha, qaybta kaalmada adeegyada, karine chow . So [facility] [phone].    ATENCIÓN: Si habla español, tiene a daniel disposición servicios gratuitos de asistencia lingüística. [facility] [phone].    We comply with applicable federal civil rights laws and [place] laws. We do not discriminate on the basis of race, color, national origin, age, disability, sex, sexual orientation, or gender identity.            Thank you!     Thank you for choosing [facility]IMARY CARE CLINIC  for your care. Our goal is always to provide you with excellent care. Hearing back from our patients is one way we can continue to improve our services. Please take a few minutes to complete the written survey that you may receive in the mail after your visit with us. Thank you!             Your Updated Medication List - Protect others around you: Learn how to safely use, store and throw away your medicines at www.disposemymeds.org.          This list is accurate as of: 12/1/17  2:44 PM.  Always use your most recent med list.                   Brand Name Dispense Instructions for use Diagnosis    acyclovir 200 MG capsule    ZOVIRAX    270 capsule    Take one po tid    HSV (herpes simplex virus)  infection       ASPIRIN PO      Take 81 mg by mouth daily        butalbital-acetaminophen-caffeine -40 MG per tablet    FIORICET/ESGIC    15 tablet    Take 1 tablet by mouth every 4 hours as needed for pain    Neck pain, Tension headache       cyclobenzaprine 10 MG tablet    FLEXERIL    90 tablet    Take 1 tablet (10 mg) by mouth 3 times daily as needed for muscle spasms    Neck pain       diazepam 10 MG tablet    VALIUM    90 tablet    Take 1 tablet (10 mg) by mouth 3 times daily    Anxiety state       EXCEDRIN PO      Take by mouth as needed        fluticasone 50 MCG/ACT spray    FLONASE    16 g    Spray 2 sprays into both nostrils every 12 hours    Chronic allergic rhinitis, unspecified seasonality, unspecified trigger       MOTRIN IB PO      Take by mouth as needed for moderate pain        sildenafil 100 MG tablet    VIAGRA    12 tablet    Take 0.5-1 tablets ( mg) by mouth daily as needed for erectile dysfunction Take 30 min to 4 hours before intercourse.  Never use with nitroglycerin, terazosin or doxazosin.    Other male erectile dysfunction

## 2017-12-01 NOTE — PROGRESS NOTES
SUBJECTIVE:    Pt is a 57 year old male with pmh of     Patient Active Problem List   Diagnosis     Anxiety state     Dysthymia       who is here for evaluation of had concerns including Refill Request.    Here for a f/u.  HA: takes firicet, 15/month, out, works/tolerated. Discussed goal long term lessen use, he has two other more pressing issues  1--reduced heart fcn, sees cardio after me today  2--abnomral cologuard test. Needs colonoscopy. [place]. He thinks could do in [facility], we will contact [facility] there to ask how we could facilitate him doing there. No change bowel habit or acute GI sx.  3--could do PSA, he thinks he gets billed for prevent tests and declines for now  Does not want shots    Allergies   Allergen Reactions     Ultram [Tramadol] Nausea     Baclofen      Imitrex [Sumatriptan] Nausea     Ivp Dye [Contrast Dye] Nausea and Vomiting     N & V, eyes, nose, throat swelled as a child     Keflex [Cephalexin Hcl]      Remeron Soltab      Valtrex [Valacyclovir] Other (See Comments)     Stomach pains     Celebrex [Celecoxib] Palpitations           Current Outpatient Prescriptions   Medication Sig Dispense Refill     ASPIRIN PO Take 81 mg by mouth daily       butalbital-acetaminophen-caffeine (FIORICET/ESGIC) -40 MG per tablet Take 1 tablet by mouth every 4 hours as needed for pain 15 tablet 5     diazepam (VALIUM) 10 MG tablet Take 1 tablet (10 mg) by mouth 3 times daily 90 tablet 5     acyclovir (ZOVIRAX) 200 MG capsule Take one po tid 270 capsule 3     cyclobenzaprine (FLEXERIL) 10 MG tablet Take 1 tablet (10 mg) by mouth 3 times daily as needed for muscle spasms 90 tablet 5     fluticasone (FLONASE) 50 MCG/ACT spray Spray 2 sprays into both nostrils every 12 hours 16 g 5     Aspirin-Acetaminophen-Caffeine (EXCEDRIN PO) Take by mouth as needed       MOTRIN IB PO Take by mouth as needed for moderate pain       sildenafil (REVATIO/VIAGRA) 100 MG cap/tab Take 0.5-1 tablets (  mg) by mouth daily as needed for erectile dysfunction Take 30 min to 4 hours before intercourse.  Never use with nitroglycerin, terazosin or doxazosin. (Patient not taking: Reported on 12/1/2017) 12 tablet 11       Social History   Substance Use Topics     Smoking status: Former Smoker     Packs/day: 0.50     Years: 30.00     Types: Cigarettes     Smokeless tobacco: Never Used     Alcohol use No         OBJECTIVE:  BP (!) 141/91 (BP Location: Right arm, Patient Position: Chair, Cuff Size: Adult Regular)  Pulse 108  Resp 18  Wt 87.1 kg (192 lb)  BMI 29.19 kg/m2  GENERAL APPEARANCE: Alert, no acute distress  NEURO: Alert, oriented, speech and mentation normal  PSYCHE: mentation appears normal, affect and mood normal    ASSESSMENT/PLAN:    HA: cont as is, see me six mo  CHF per [place] MD ridley, sees heart MD here after me--as seeing them today I did not do further eval/treat of this today  For abnl Cologuard we agreed to above plan, big problem is remote dwelling from colonoscopy provider  Offer PSA next visit again        PATRICIA BAUM MD

## 2017-12-01 NOTE — NURSING NOTE
Chief Complaint   Patient presents with     New Patient     systolic congestive heart failure, unspecified congestive HF chronicity     Vitals were taken and medications were reconciled.    Erika Marcum MA    3:07 PM

## 2017-12-01 NOTE — TELEPHONE ENCOUNTER
Left a message to the pt to call me back.    Spoke with [facility] and they will accept the referral and can schedule the pt.  Referral fax to [phone].      [facility]    [phone] [address]   [address]   [place] [address]      [facility]  -------------------------------------------------------------------      ----- Message from Donald Lorenz MD sent at 12/1/2017  1:26 PM CST -----  He had abnormal Cologuard. Lives near [facility], there is GI group there called [facility], can you find out from them if he could do colonoscopy there, and if so how would we help set that up?

## 2017-12-01 NOTE — LETTER
12/1/2017      RE: Lul Arriola  [address] [address]       Dear Colleague,    Thank you for the opportunity to participate in the care of your patient, Lul Arriola, at the [facility]. Please see a copy of my visit note below.    CARDIOLOGY NEW OFFICE VISIT    HPI: Lul Arriola is a 57 year old male being seen today for evaluation of Cardiomyopathy.   He has a PMH of Hodgkin's Lymphoma (diagnosed 1993, s/p MOPP chemo and radiation, achieved remission), prior smoking (30 pack years, quit two years ago), anxiety, depression and dilated cardiomyopathy who presents to establish care for his cardiomyopathy.    He says he was informed about 20 years ago (after chemotherapy) that he had a weak heart function, however doesn't recall an EF number.     He was doing fine until Feb 2016, when he was in [place] and suddenly started experiencing SOB, palpitations and diaphoresis. He went to a local hospital, was told his EF is about 20%, he didn't have an angiogram (and is not sure if he had any non invasive ischemia eval). He says he was told he shouldnt leave the hospital without a vest that would shock him if he were to go into an abnormal rhythm, however he left AMA.    Currently, he complains of severe dyspnea on minimal exertion, says he is huffing and puffing even while moving around the house. Denies any chest pain, pressure, heaviness or tightness. He does endorse significant abdominal bloating, frequent palpitations, and frequent dizziness that seems to occur when he stands up. He denies early satiety. Notably, whenever he measures his HR its in the 90's (at rest).    He has not had regular coordinated cardiac care in the past. He is visibly frustrated with the healthcare system.      PAST MEDICAL HISTORY:No past medical history on file.    CURRENT MEDICATIONS:  Current Outpatient Prescriptions   Medication Sig  Dispense Refill     ASPIRIN PO Take 81 mg by mouth daily       butalbital-acetaminophen-caffeine (FIORICET/ESGIC) -40 MG per tablet Take 1 tablet by mouth every 4 hours as needed for pain 15 tablet 5     diazepam (VALIUM) 10 MG tablet Take 1 tablet (10 mg) by mouth 3 times daily 90 tablet 5     cyclobenzaprine (FLEXERIL) 10 MG tablet Take 1 tablet (10 mg) by mouth 3 times daily as needed for muscle spasms 90 tablet 5     fluticasone (FLONASE) 50 MCG/ACT spray Spray 2 sprays into both nostrils every 12 hours 16 g 5     Aspirin-Acetaminophen-Caffeine (EXCEDRIN PO) Take by mouth as needed       MOTRIN IB PO Take by mouth as needed for moderate pain       acyclovir (ZOVIRAX) 200 MG capsule Take one po tid (Patient not taking: Reported on 12/1/2017) 270 capsule 3     sildenafil (REVATIO/VIAGRA) 100 MG cap/tab Take 0.5-1 tablets ( mg) by mouth daily as needed for erectile dysfunction Take 30 min to 4 hours before intercourse.  Never use with nitroglycerin, terazosin or doxazosin. (Patient not taking: Reported on 12/1/2017) 12 tablet 11       PAST SURGICAL HISTORY:  No past surgical history on file.    ALLERGIES  Ultram [tramadol]; Baclofen; Imitrex [sumatriptan]; Ivp dye [contrast dye]; Keflex [cephalexin hcl]; Remeron soltab; Valtrex [valacyclovir]; and Celebrex [celecoxib]    FAMILY HX:  No family history on file.    SOCIAL HX:  Social History     Social History     Marital status: Single     Spouse name: N/A     Number of children: N/A     Years of education: N/A     Social History Main Topics     Smoking status: Former Smoker     Packs/day: 0.50     Years: 30.00     Types: Cigarettes     Smokeless tobacco: Never Used     Alcohol use No     Drug use: No     Sexual activity: Not Asked     Other Topics Concern     None     Social History Narrative       ROS:  Constitutional: No fever, chills, or sweats. No weight gain/loss.   HEENT: No visual disturbance, ear ache, epistaxis, sore throat.  "  Allergies/Immunologic: Negative.   Respiratory: No cough, hemoptysis.   Cardiovascular: As per HPI.   GI: No nausea, vomiting, hematemesis, melena, or hematochezia.   : No urinary frequency, dysuria, or hematuria.   Integument: No rash.   Psychiatric: No anxiety / depression.   Neuro: No speech disturbance, focal sensory or motor deficit.   Endocrinology: No polyuria / polyphagia.   Musculoskeletal: No myalgia.    VITAL SIGNS:  /84 (BP Location: Left arm, Patient Position: Chair, Cuff Size: Adult Regular)  Pulse 108  Ht 1.803 m (5' 11\")  Wt 87.1 kg (192 lb)  SpO2 98%  BMI 26.78 kg/m2  Body mass index is 26.78 kg/(m^2).  Wt Readings from Last 2 Encounters:   12/01/17 87.1 kg (192 lb)   12/01/17 87.1 kg (192 lb)       PHYSICAL EXAM  /84 (BP Location: Left arm, Patient Position: Chair, Cuff Size: Adult Regular)  Pulse 108  Ht 1.803 m (5' 11\")  Wt 87.1 kg (192 lb)  SpO2 98%  BMI 26.78 kg/m2  GEN: aao x 3, NAD  Neck: JVD ~ 12 cms  LUNGS: No wheezing. +rales at both bases  HEART: S1S2 audible, no murmurs or rubs. Regular rhythm. +tachycardia  ABDOMEN: Soft, nt, +BS  EXTREMITIES: Warm calves, +DPs, trace LE edema  NEURO: aao x 3, no focal deficits      LABS  Recent Labs   Lab Test  08/16/17   1419  03/05/16   1237   WBC  6.2  5.8   HGB  13.8  14.4   HCT  42.1  43.8   PLT  204  221     Recent Labs   Lab Test  08/16/17   1419  03/05/16   1237   NA  138  140   POTASSIUM  3.8  3.9   CHLORIDE  107  109   CO2  23  22   GLC  113*  101*   BUN  13  20   CR  0.94  0.83   CLIFFORD  9.1  8.8     Recent Labs   Lab Test  08/16/17   1419  12/05/14   1211  07/30/13   1445   CHOL  216*  213*  190   HDL  46  42  43   LDL  132*  130*  117   TRIG  190*  204*  148   CHOLHDLRATIO   --   5.0  4.4   NHDL  170*   --    --         ECHO: 12/1/17 - In process  Review of images shows LVEF ~15-20% with severe LV dilation.      ASSESSMENT AND PLAN:   - 57M, PM of Hodgkin's Lymphoma (diagnosed 1993, s/p MOPP chemo and radiation, " achieved remission), prior smoking, anxiety, depression and dilated cardiomyopathy who presents to establish care for his cardiomyopathy.  - Stage C, NYHA Class III  - The etiology of his HF is likely non ischemic (possibly chemotherapy mediated), however there is no ischemia evaluation available at this time.  - He appears mildly volume up today. However, given his chronic untreated heart failure, his exam may not appear as bad as his true volume status    - f/u echocardiogram from today  - He is agreeable to only starting low dose Lisinopril today, will start at 5 mg daily  - Will start Lasix 20 mg daily (diuretic naive)  - Will obtain BNP and NT pro BNP  - f/u in [facility] in 7-10 days and follow up with us in 1 month  - Plan to start low dose Coreg and uptitrate ACE-I as tolerated on next visit    Oren Jordan MD  Cardiovascular Disease Fellow  Pager: [phone]    Attending Attestation:  Patient seen and examined by me with the Fellow, Dr. Jordan. I have performed all pertinent elements of the physical examination and reviewed the note above. I have reviewed pertinent laboratory, echocardiographic, imaging, and cardiac catheterization results. I agree with the plan of care as described in this note.    Axel Blanco MD, PhD      CC  Patient Care Team:  Donald Lorenz MD as PCP - General (Family Practice)  Mary Jane Macario APRN CNS as Nurse Practitioner (Clinical Nurse Specialist)

## 2017-12-01 NOTE — MR AVS SNAPSHOT
After Visit Summary   12/1/2017    Lul Arriola    MRN: [id number]           Patient Information     Date Of Birth          1960        Visit Information        Provider Department      12/1/2017 3:00 PM Axel Blanco MD [facility]        Today's Diagnoses     Benign essential hypertension    -  1    Systolic congestive heart failure, unspecified congestive heart failure chronicity (H)        SOB (shortness of breath)          Care Instructions    Please start taking Lisinopril 5 MG once daily.    Please start taking Lasix 20 MG once daily.    Please have some labs drawn early next week and fax results to Dr. Blanco at [phone].    Thank you for your visit today.  Please call me with any questions or concerns.   Grzegorz Méndez  RN  Cardiology Care Coordinator  [phone], press option 1 then option 3              Follow-ups after your visit        Follow-up notes from your care team     Return in about 1 month (around 1/1/2018) for chf, Dr. Blanco.      Your next 10 appointments already scheduled     Jan 05, 2018 10:00 AM CST   (Arrive by 9:45 AM)   RETURN HEART FAILURE with Axel Blanco MD   [facility] ([email or web address])    [address] [address] [phone]            Mar 09, 2018  1:15 PM CST   Adult Med Follow UP with BRENT Payne CNS   [facility] ([facility])    [address]  [address] [facility] [id number] [phone]              Future tests that were ordered for you today     Open Future Orders        Priority Expected Expires Ordered    N terminal pro BNP outpatient Routine  12/1/2018 12/1/2017    Basic metabolic panel Routine  12/1/2018 12/1/2017            Who to contact     If you have questions or need follow up information about today's clinic visit or your schedule please contact [facility] directly  "at [phone].  Normal or non-critical lab and imaging results will be communicated to you by MyChart, letter or phone within 4 business days after the clinic has received the results. If you do not hear from us within 7 days, please contact the clinic through [employer] or phone. If you have a critical or abnormal lab result, we will notify you by phone as soon as possible.  Submit refill requests through [employer] or call your pharmacy and they will forward the refill request to us. Please allow 3 business days for your refill to be completed.          Additional Information About Your Visit        [employer] Information     [employer] lets you send messages to your doctor, view your test results, renew your prescriptions, schedule appointments and more. To sign up, go to [email or web address] . Click on \"Log in\" on the left side of the screen, which will take you to the Welcome page. Then click on \"Sign up Now\" on the right side of the page.     You will be asked to enter the access code listed below, as well as some personal information. Please follow the directions to create your username and password.     Your access code is: [email or web address]  Expires: 2/15/2018  6:31 AM     Your access code will  in 90 days. If you need help or a new code, please call your [facility] clinic or [phone].        Care EveryWhere ID     This is your Care EveryWhere ID. This could be used by other organizations to access your [facility] medical records  [id number]        Your Vitals Were     Pulse Height Pulse Oximetry BMI (Body Mass Index)          108 1.803 m (5' 11\") 98% 26.78 kg/m2         Blood Pressure from Last 3 Encounters:   17 135/84   17 (!) 141/91   17 138/81    Weight from Last 3 Encounters:   17 87.1 kg (192 lb)   17 87.1 kg (192 lb)   17 85.8 kg (189 lb 1.6 oz)                 Today's Medication Changes          These changes are accurate as of: 17  5:13 PM.  If you have any " questions, ask your nurse or doctor.               These medicines have changed or have updated prescriptions.        Dose/Directions    butalbital-acetaminophen-caffeine -40 MG per tablet   Commonly known as:  FIORICET/ESGIC   This may have changed:  additional instructions   Used for:  Neck pain, Tension headache   Changed by:  Donald Lorenz MD        Dose:  1 tablet   Take 1 tablet by mouth every 4 hours as needed for pain   Quantity:  15 tablet   Refills:  5            Where to get your medicines      Some of these will need a paper prescription and others can be bought over the counter.  Ask your nurse if you have questions.     Bring a paper prescription for each of these medications     butalbital-acetaminophen-caffeine -40 MG per tablet                Primary Care Provider Office Phone # Fax #    Donald Lorenz [phone]       [address] [address]        Equal Access to Services     [facility]: Hadii lars espinoza hadasho Soomaali, waaxda luqadaha, qaybta kaalmada adeegyada, karine hunterin hayotis chow . So [facility] [phone].    ATENCIÓN: Si habla español, tiene a daniel disposición servicios gratuitos de asistencia lingüística. Llame al [phone].    We comply with applicable federal civil rights laws and [place] laws. We do not discriminate on the basis of race, color, national origin, age, disability, sex, sexual orientation, or gender identity.            Thank you!     Thank you for choosing [facility]  for your care. Our goal is always to provide you with excellent care. Hearing back from our patients is one way we can continue to improve our services. Please take a few minutes to complete the written survey that you may receive in the mail after your visit with us. Thank you!             Your Updated Medication List - Protect others around you: Learn how to safely use, store and throw away your medicines at  www.disposemymeds.org.          This list is accurate as of: 12/1/17  5:13 PM.  Always use your most recent med list.                   Brand Name Dispense Instructions for use Diagnosis    acyclovir 200 MG capsule    ZOVIRAX    270 capsule    Take one po tid    HSV (herpes simplex virus) infection       ASPIRIN PO      Take 81 mg by mouth daily        butalbital-acetaminophen-caffeine -40 MG per tablet    FIORICET/ESGIC    15 tablet    Take 1 tablet by mouth every 4 hours as needed for pain    Neck pain, Tension headache       cyclobenzaprine 10 MG tablet    FLEXERIL    90 tablet    Take 1 tablet (10 mg) by mouth 3 times daily as needed for muscle spasms    Neck pain       diazepam 10 MG tablet    VALIUM    90 tablet    Take 1 tablet (10 mg) by mouth 3 times daily    Anxiety state       EXCEDRIN PO      Take by mouth as needed        fluticasone 50 MCG/ACT spray    FLONASE    16 g    Spray 2 sprays into both nostrils every 12 hours    Chronic allergic rhinitis, unspecified seasonality, unspecified trigger       furosemide 20 MG tablet    LASIX    90 tablet    Take 1 tablet (20 mg) by mouth daily    Systolic congestive heart failure, unspecified congestive heart failure chronicity (H), Benign essential hypertension       lisinopril 5 MG tablet    PRINIVIL/ZESTRIL    90 tablet    Take 1 tablet (5 mg) by mouth daily    Systolic congestive heart failure, unspecified congestive heart failure chronicity (H), Benign essential hypertension       MOTRIN IB PO      Take by mouth as needed for moderate pain        sildenafil 100 MG tablet    VIAGRA    12 tablet    Take 0.5-1 tablets ( mg) by mouth daily as needed for erectile dysfunction Take 30 min to 4 hours before intercourse.  Never use with nitroglycerin, terazosin or doxazosin.    Other male erectile dysfunction

## 2017-12-01 NOTE — PATIENT INSTRUCTIONS
[facility] Medication Refill Request Information:  * Please contact your pharmacy regarding ANY request for medication refills.  ** [facility] Prescription Fax = [phone]  * Please allow 3 business days for routine medication refills.  * Please allow 5 business days for controlled substance medication refills.     [facility] Test Result notification information:  *You will be notified with in 7-10 days of your appointment day regarding the results of your test.  If you are on MyChart you will be notified as soon as the provider has reviewed the results and signed off on them.    [facility] [phone]

## 2017-12-01 NOTE — NURSING NOTE
Chief Complaint   Patient presents with     Refill Request     Patient is here for medication refills     Stephen Talbert CMA (AAMA) at 1:12 PM on 12/1/2017

## 2017-12-01 NOTE — PROGRESS NOTES
CARDIOLOGY NEW OFFICE VISIT    HPI: Lul Arriola is a 57 year old male being seen today for evaluation of Cardiomyopathy.   He has a PMH of Hodgkin's Lymphoma (diagnosed 1993, s/p MOPP chemo and radiation, achieved remission), prior smoking (30 pack years, quit two years ago), anxiety, depression and dilated cardiomyopathy who presents to establish care for his cardiomyopathy.    He says he was informed about 20 years ago (after chemotherapy) that he had a weak heart function, however doesn't recall an EF number.     He was doing fine until Feb 2016, when he was in [place] and suddenly started experiencing SOB, palpitations and diaphoresis. He went to a local hospital, was told his EF is about 20%, he didn't have an angiogram (and is not sure if he had any non invasive ischemia eval). He says he was told he shouldnt leave the hospital without a vest that would shock him if he were to go into an abnormal rhythm, however he left AMA.    Currently, he complains of severe dyspnea on minimal exertion, says he is huffing and puffing even while moving around the house. Denies any chest pain, pressure, heaviness or tightness. He does endorse significant abdominal bloating, frequent palpitations, and frequent dizziness that seems to occur when he stands up. He denies early satiety. Notably, whenever he measures his HR its in the 90's (at rest).    He has not had regular coordinated cardiac care in the past. He is visibly frustrated with the healthcare system.      PAST MEDICAL HISTORY:No past medical history on file.    CURRENT MEDICATIONS:  Current Outpatient Prescriptions   Medication Sig Dispense Refill     ASPIRIN PO Take 81 mg by mouth daily       butalbital-acetaminophen-caffeine (FIORICET/ESGIC) -40 MG per tablet Take 1 tablet by mouth every 4 hours as needed for pain 15 tablet 5     diazepam (VALIUM) 10 MG tablet Take 1 tablet (10 mg) by mouth 3 times daily 90 tablet 5     cyclobenzaprine (FLEXERIL) 10  MG tablet Take 1 tablet (10 mg) by mouth 3 times daily as needed for muscle spasms 90 tablet 5     fluticasone (FLONASE) 50 MCG/ACT spray Spray 2 sprays into both nostrils every 12 hours 16 g 5     Aspirin-Acetaminophen-Caffeine (EXCEDRIN PO) Take by mouth as needed       MOTRIN IB PO Take by mouth as needed for moderate pain       acyclovir (ZOVIRAX) 200 MG capsule Take one po tid (Patient not taking: Reported on 12/1/2017) 270 capsule 3     sildenafil (REVATIO/VIAGRA) 100 MG cap/tab Take 0.5-1 tablets ( mg) by mouth daily as needed for erectile dysfunction Take 30 min to 4 hours before intercourse.  Never use with nitroglycerin, terazosin or doxazosin. (Patient not taking: Reported on 12/1/2017) 12 tablet 11       PAST SURGICAL HISTORY:  No past surgical history on file.    ALLERGIES  Ultram [tramadol]; Baclofen; Imitrex [sumatriptan]; Ivp dye [contrast dye]; Keflex [cephalexin hcl]; Remeron soltab; Valtrex [valacyclovir]; and Celebrex [celecoxib]    FAMILY HX:  No family history on file.    SOCIAL HX:  Social History     Social History     Marital status: Single     Spouse name: N/A     Number of children: N/A     Years of education: N/A     Social History Main Topics     Smoking status: Former Smoker     Packs/day: 0.50     Years: 30.00     Types: Cigarettes     Smokeless tobacco: Never Used     Alcohol use No     Drug use: No     Sexual activity: Not Asked     Other Topics Concern     None     Social History Narrative       ROS:  Constitutional: No fever, chills, or sweats. No weight gain/loss.   HEENT: No visual disturbance, ear ache, epistaxis, sore throat.   Allergies/Immunologic: Negative.   Respiratory: No cough, hemoptysis.   Cardiovascular: As per HPI.   GI: No nausea, vomiting, hematemesis, melena, or hematochezia.   : No urinary frequency, dysuria, or hematuria.   Integument: No rash.   Psychiatric: No anxiety / depression.   Neuro: No speech disturbance, focal sensory or motor deficit.  "  Endocrinology: No polyuria / polyphagia.   Musculoskeletal: No myalgia.    VITAL SIGNS:  /84 (BP Location: Left arm, Patient Position: Chair, Cuff Size: Adult Regular)  Pulse 108  Ht 1.803 m (5' 11\")  Wt 87.1 kg (192 lb)  SpO2 98%  BMI 26.78 kg/m2  Body mass index is 26.78 kg/(m^2).  Wt Readings from Last 2 Encounters:   12/01/17 87.1 kg (192 lb)   12/01/17 87.1 kg (192 lb)       PHYSICAL EXAM  /84 (BP Location: Left arm, Patient Position: Chair, Cuff Size: Adult Regular)  Pulse 108  Ht 1.803 m (5' 11\")  Wt 87.1 kg (192 lb)  SpO2 98%  BMI 26.78 kg/m2  GEN: aao x 3, NAD  Neck: JVD ~ 12 cms  LUNGS: No wheezing. +rales at both bases  HEART: S1S2 audible, no murmurs or rubs. Regular rhythm. +tachycardia  ABDOMEN: Soft, nt, +BS  EXTREMITIES: Warm calves, +DPs, trace LE edema  NEURO: aao x 3, no focal deficits      LABS  Recent Labs   Lab Test  08/16/17   1419  03/05/16   1237   WBC  6.2  5.8   HGB  13.8  14.4   HCT  42.1  43.8   PLT  204  221     Recent Labs   Lab Test  08/16/17   1419  03/05/16   1237   NA  138  140   POTASSIUM  3.8  3.9   CHLORIDE  107  109   CO2  23  22   GLC  113*  101*   BUN  13  20   CR  0.94  0.83   CLIFFORD  9.1  8.8     Recent Labs   Lab Test  08/16/17   1419  12/05/14   1211  07/30/13   1445   CHOL  216*  213*  190   HDL  46  42  43   LDL  132*  130*  117   TRIG  190*  204*  148   CHOLHDLRATIO   --   5.0  4.4   NHDL  170*   --    --         ECHO: 12/1/17 - In process  Review of images shows LVEF ~15-20% with severe LV dilation.      ASSESSMENT AND PLAN:   - 57M, PMH of Hodgkin's Lymphoma (diagnosed 1993, s/p MOPP chemo and radiation, achieved remission), prior smoking, anxiety, depression and dilated cardiomyopathy who presents to establish care for his cardiomyopathy.  - Stage C, NYHA Class III  - The etiology of his HF is likely non ischemic (possibly chemotherapy mediated), however there is no ischemia evaluation available at this time.  - He appears mildly volume up " today. However, given his chronic untreated heart failure, his exam may not appear as bad as his true volume status    - f/u echocardiogram from today  - He is agreeable to only starting low dose Lisinopril today, will start at 5 mg daily  - Will start Lasix 20 mg daily (diuretic naive)  - Will obtain BNP and NT pro BNP  - f/u in [facility] in 7-10 days and follow up with us in 1 month  - Plan to start low dose Coreg and uptitrate ACE-I as tolerated on next visit    Oren Jordan MD  Cardiovascular Disease Fellow  Pager: [phone]    Attending Attestation:  Patient seen and examined by me with the Fellow, Dr. Jordan. I have performed all pertinent elements of the physical examination and reviewed the note above. I have reviewed pertinent laboratory, echocardiographic, imaging, and cardiac catheterization results. I agree with the plan of care as described in this note.    Axel Blanco MD, PhD      CC  Patient Care Team:  Donald Lorenz MD as PCP - General (Family Practice)  Mary Jane Macario APRN CNS as Nurse Practitioner (Clinical Nurse Specialist)  DONALD LORENZ

## 2017-12-01 NOTE — NURSING NOTE
Labs: BMP, and NT pro BNT.  Patient was instructed to return for the next laboratory testing 3 days after starting new medicaton. Patient demonstrated understanding of this information and agreed to call with further questions or concerns.   Med Reconcile: Reviewed and verified all current medications with the patient. The updated medication list was printed and given to the patient.  New Medication: Lisinopril 5 MG once daily.  Lasix 20 MG daily.Patient was educated regarding newly prescribed medication, including discussion of  the indication, administration, side effects, and when to report to MD or RN. Patient demonstrated understanding of this information and agreed to call with further questions or concerns.  Return Appointment: Follow up in one year. Patient given instructions regarding scheduling next clinic visit. Patient demonstrated understanding of this information and agreed to call with further questions or concerns.  Patient stated he understood all health information given and agreed to call with further questions or concerns.

## 2017-12-07 ENCOUNTER — TRANSFERRED RECORDS (OUTPATIENT)
Dept: HEALTH INFORMATION MANAGEMENT | Facility: CLINIC | Age: 57
End: 2017-12-07

## 2017-12-07 NOTE — TELEPHONE ENCOUNTER
Spoke with pt and informed the colonoscopy order and the place to go. Colonoscopy order was faxed to [facility].

## 2018-01-03 ENCOUNTER — PRE VISIT (OUTPATIENT)
Dept: CARDIOLOGY | Facility: CLINIC | Age: 58
End: 2018-01-03

## 2018-01-03 NOTE — PROGRESS NOTES
12/1/17--Echo  Interpretation Summary  Severely dilated left ventricle with severely reduced global LV systolic  function, LVEF=20-25% (traced 23%.)  Normal right ventricular structure and function.  No significant valvular abnormalities are noted.  Normal inferior vena cava with normal respiratory variation (estimated RA  pressure 3 mmHg.)  No pericardial effusion.  Pulmonary artery systolic pressure cannot be assessed.  Previous study not available for comparison.

## 2018-01-05 ENCOUNTER — OFFICE VISIT (OUTPATIENT)
Dept: CARDIOLOGY | Facility: CLINIC | Age: 58
End: 2018-01-05
Attending: INTERNAL MEDICINE
Payer: MEDICARE

## 2018-01-05 VITALS
HEART RATE: 92 BPM | BODY MASS INDEX: 27.31 KG/M2 | DIASTOLIC BLOOD PRESSURE: 79 MMHG | OXYGEN SATURATION: 96 % | HEIGHT: 71 IN | SYSTOLIC BLOOD PRESSURE: 127 MMHG | WEIGHT: 195.1 LBS

## 2018-01-05 DIAGNOSIS — I10 BENIGN ESSENTIAL HYPERTENSION: ICD-10-CM

## 2018-01-05 DIAGNOSIS — R06.02 SOB (SHORTNESS OF BREATH): ICD-10-CM

## 2018-01-05 DIAGNOSIS — I50.20 SYSTOLIC CONGESTIVE HEART FAILURE, UNSPECIFIED CONGESTIVE HEART FAILURE CHRONICITY: ICD-10-CM

## 2018-01-05 DIAGNOSIS — I42.9 SECONDARY CARDIOMYOPATHY (H): Primary | ICD-10-CM

## 2018-01-05 LAB
ANION GAP SERPL CALCULATED.3IONS-SCNC: 10 MMOL/L (ref 3–14)
BUN SERPL-MCNC: 17 MG/DL (ref 7–30)
CALCIUM SERPL-MCNC: 9.1 MG/DL (ref 8.5–10.1)
CHLORIDE SERPL-SCNC: 103 MMOL/L (ref 94–109)
CO2 SERPL-SCNC: 24 MMOL/L (ref 20–32)
CREAT SERPL-MCNC: 1 MG/DL (ref 0.66–1.25)
GFR SERPL CREATININE-BSD FRML MDRD: 77 ML/MIN/1.7M2
GLUCOSE SERPL-MCNC: 108 MG/DL (ref 70–99)
NT-PROBNP SERPL-MCNC: 1097 PG/ML (ref 0–125)
POTASSIUM SERPL-SCNC: 3.8 MMOL/L (ref 3.4–5.3)
SODIUM SERPL-SCNC: 137 MMOL/L (ref 133–144)

## 2018-01-05 PROCEDURE — 36415 COLL VENOUS BLD VENIPUNCTURE: CPT | Performed by: INTERNAL MEDICINE

## 2018-01-05 PROCEDURE — 80048 BASIC METABOLIC PNL TOTAL CA: CPT | Performed by: INTERNAL MEDICINE

## 2018-01-05 PROCEDURE — 99214 OFFICE O/P EST MOD 30 MIN: CPT | Mod: ZP | Performed by: INTERNAL MEDICINE

## 2018-01-05 PROCEDURE — 83880 ASSAY OF NATRIURETIC PEPTIDE: CPT | Performed by: INTERNAL MEDICINE

## 2018-01-05 PROCEDURE — G0463 HOSPITAL OUTPT CLINIC VISIT: HCPCS | Mod: ZF

## 2018-01-05 RX ORDER — CARVEDILOL 3.12 MG/1
6.25 TABLET ORAL 2 TIMES DAILY WITH MEALS
Qty: 240 TABLET | Refills: 1 | COMMUNITY
Start: 2018-01-05 | End: 2018-03-23

## 2018-01-05 ASSESSMENT — PAIN SCALES - GENERAL: PAINLEVEL: NO PAIN (0)

## 2018-01-05 NOTE — MR AVS SNAPSHOT
After Visit Summary   1/5/2018    Lul Arriola    MRN: [id number]           Patient Information     Date Of Birth          1960        Visit Information        Provider Department      1/5/2018 10:00 AM Axel Blanco MD [facility]        Today's Diagnoses     Secondary cardiomyopathy (H)    -  1      Care Instructions    Please start taking coreg/carvedilol 3.125 MG twice daily.  Please take for 2 weeks and then increase evening dose to 6.25 MG. Take Coreg/Carvedilol 3.125 MG every am and 6.25 MG every evening for 2 weeks and then increase morning dose to 6.25 MG. Please take 6.25 MG twice daily until seen by Dr. Blanco again.       Starting 1/5/2018-Coreg 3.125 MG twice a day  Starting 1/19/18--Coreg 3.125 MG in the morning and 6.25 MG in the evening.  Starting 2/2/18--Coreg 6.25 MG twice a day.    Thank you for your visit today.  Please call me with any questions or concerns.   Grzegorz Méndez RN  Cardiology Care Coordinator  [phone], press option 1 then option 3            Follow-ups after your visit        Additional Services     Follow-Up with Cardiologist       Please schedule a follow up visit to see Dr. Blanco in one month.  RTN, Cardiomyopathy.                  Your next 10 appointments already scheduled     Feb 23, 2018 12:30 PM CST   (Arrive by 12:15 PM)   RETURN HEART FAILURE with Axel Blanco MD   [facility] ([facility])    [address] [facility]  [address] [place] [id number] [phone]            Mar 09, 2018  1:15 PM CST   Adult Med Follow UP with BRENT Payne CNS   [facility] ([facility])    [address]  [address] [facility] [id number] [phone]              Future tests that were ordered for you today     Open Future Orders        Priority Expected Expires Ordered    Follow-Up with Cardiologist Routine 2/4/2018 4/5/2018 1/5/2018  "           Who to contact     If you have questions or need follow up information about today's clinic visit or your schedule please contact [facility] directly at [phone].  Normal or non-critical lab and imaging results will be communicated to you by MyChart, letter or phone within 4 business days after the clinic has received the results. If you do not hear from us within 7 days, please contact the clinic through [employer] or phone. If you have a critical or abnormal lab result, we will notify you by phone as soon as possible.  Submit refill requests through [employer] or call your pharmacy and they will forward the refill request to us. Please allow 3 business days for your refill to be completed.          Additional Information About Your Visit        [employer] Information     [employer] lets you send messages to your doctor, view your test results, renew your prescriptions, schedule appointments and more. To sign up, go to [email or web address] . Click on \"Log in\" on the left side of the screen, which will take you to the Welcome page. Then click on \"Sign up Now\" on the right side of the page.     You will be asked to enter the access code listed below, as well as some personal information. Please follow the directions to create your username and password.     Your access code is: [email or web address]  Expires: 2/15/2018  6:31 AM     Your access code will  in 90 days. If you need help or a new code, please call your [facility] clinic or [phone].        Care EveryWhere ID     This is your Care EveryWhere ID. This could be used by other organizations to access your [facility] medical records  [id number]        Your Vitals Were     Pulse Height Pulse Oximetry BMI (Body Mass Index)          92 1.803 m (5' 11\") 96% 27.21 kg/m2         Blood Pressure from Last 3 Encounters:   18 127/79   17 135/84   17 (!) 141/91    Weight from Last 3 Encounters:   18 88.5 kg (195 lb 1.6 oz)   17 " 87.1 kg (192 lb)   12/01/17 87.1 kg (192 lb)               Primary Care Provider Office Phone # Fax #    Donald Lorenz [phone]       [address] [address]        Equal Access to Services     [address] : Hadii aad ku hadasho Soomaali, waaxda luqadaha, qaybta kaalmada adeegyada, waxay elsain hayzeen natalie leachmariettakandy bains. So [facility] [phone].    ATENCIÓN: Si habla español, tiene a daniel disposición servicios gratuitos de asistencia lingüística. [facility] [phone].    We comply with applicable federal civil rights laws and [place] laws. We do not discriminate on the basis of race, color, national origin, age, disability, sex, sexual orientation, or gender identity.            Thank you!     Thank you for choosing [facility]  for your care. Our goal is always to provide you with excellent care. Hearing back from our patients is one way we can continue to improve our services. Please take a few minutes to complete the written survey that you may receive in the mail after your visit with us. Thank you!             Your Updated Medication List - Protect others around you: Learn how to safely use, store and throw away your medicines at www.disposemymeds.org.          This list is accurate as of: 1/5/18 10:54 AM.  Always use your most recent med list.                   Brand Name Dispense Instructions for use Diagnosis    acyclovir 200 MG capsule    ZOVIRAX    270 capsule    Take one po tid    HSV (herpes simplex virus) infection       ASPIRIN PO      Take 81 mg by mouth daily        butalbital-acetaminophen-caffeine -40 MG per tablet    FIORICET/ESGIC    15 tablet    Take 1 tablet by mouth every 4 hours as needed for pain    Neck pain, Tension headache       carvedilol 3.125 MG tablet    COREG    240 tablet    Take 2 tablets (6.25 mg) by mouth 2 times daily (with meals)    Secondary cardiomyopathy (H)       cyclobenzaprine 10 MG tablet    FLEXERIL    90  tablet    Take 1 tablet (10 mg) by mouth 3 times daily as needed for muscle spasms    Neck pain       diazepam 10 MG tablet    VALIUM    90 tablet    Take 1 tablet (10 mg) by mouth 3 times daily    Anxiety state       EXCEDRIN PO      Take by mouth as needed        fluticasone 50 MCG/ACT spray    FLONASE    16 g    Spray 2 sprays into both nostrils every 12 hours    Chronic allergic rhinitis, unspecified seasonality, unspecified trigger       furosemide 20 MG tablet    LASIX    90 tablet    Take 1 tablet (20 mg) by mouth daily    Systolic congestive heart failure, unspecified congestive heart failure chronicity (H), Benign essential hypertension       lisinopril 5 MG tablet    PRINIVIL/ZESTRIL    90 tablet    Take 1 tablet (5 mg) by mouth daily    Systolic congestive heart failure, unspecified congestive heart failure chronicity (H), Benign essential hypertension       MOTRIN IB PO      Take by mouth as needed for moderate pain        sildenafil 100 MG tablet    VIAGRA    12 tablet    Take 0.5-1 tablets ( mg) by mouth daily as needed for erectile dysfunction Take 30 min to 4 hours before intercourse.  Never use with nitroglycerin, terazosin or doxazosin.    Other male erectile dysfunction

## 2018-01-05 NOTE — PATIENT INSTRUCTIONS
Please start taking coreg/carvedilol 3.125 MG twice daily.  Please take for 2 weeks and then increase evening dose to 6.25 MG. Take Coreg/Carvedilol 3.125 MG every am and 6.25 MG every evening for 2 weeks and then increase morning dose to 6.25 MG. Please take 6.25 MG twice daily until seen by Dr. Blanco again.       Starting 1/5/2018-Coreg 3.125 MG twice a day  Starting 1/19/18--Coreg 3.125 MG in the morning and 6.25 MG in the evening.  Starting 2/2/18--Coreg 6.25 MG twice a day.    Thank you for your visit today.  Please call me with any questions or concerns.   Grzegorz Méndez RN  Cardiology Care Coordinator  [phone], press option 1 then option 3

## 2018-01-05 NOTE — NURSING NOTE
Med Reconcile: Reviewed and verified all current medications with the patient. The updated medication list was printed and given to the patient.  New Medication: Coreg, see patient instructions for up titration schedule.Patient was educated regarding newly prescribed medication, including discussion of  the indication, administration, side effects, and when to report to MD or RN. Patient demonstrated understanding of this information and agreed to call with further questions or concerns.  Return Appointment: Follow up in one month. Patient given instructions regarding scheduling next clinic visit. Patient demonstrated understanding of this information and agreed to call with further questions or concerns.  Patient stated he understood all health information given and agreed to call with further questions or concerns.

## 2018-01-05 NOTE — NURSING NOTE
Chief Complaint   Patient presents with     Follow Up For     Cardiomyopathy     Vitals were taken and medications were reconciled.  Nii Romero, JEAN PAUL  9:54 AM'

## 2018-01-05 NOTE — LETTER
1/5/2018      RE: Lul Arriola  [address] [facility] [id number]       Dear Colleague,    Thank you for the opportunity to participate in the care of your patient, Lul Arriola, at the [facility]. Please see a copy of my visit note below.    CARDIOLOGY NEW OFFICE VISIT    HPI: Lul Arriola is a 57 year old male being seen today for evaluation of Cardiomyopathy.   He has a PMH of Hodgkin's Lymphoma (diagnosed 1993, s/p MOPP chemo and radiation, achieved remission), prior smoking (30 pack years, quit two years ago), anxiety, depression and dilated cardiomyopathy who presents to establish care for his cardiomyopathy.    He says he was informed about 20 years ago (after chemotherapy) that he had a weak heart function, however doesn't recall an EF number.     He was doing fine until Feb 2016, when he was in [place] and suddenly started experiencing SOB, palpitations and diaphoresis. He went to a local hospital, was told his EF is about 20%, he didn't have an angiogram (and is not sure if he had any non invasive ischemia eval). He says he was told he shouldnt leave the hospital without a vest that would shock him if he were to go into an abnormal rhythm, however he left AMA.    Currently, he complains of severe dyspnea on minimal exertion, says he is huffing and puffing even while moving around the house. Denies any chest pain, pressure, heaviness or tightness. He does endorse significant abdominal bloating, frequent palpitations, and frequent dizziness that seems to occur when he stands up. He denies early satiety. Notably, whenever he measures his HR its in the 90's (at rest).    He has not had regular coordinated cardiac care in the past. He is visibly frustrated with the healthcare system.    PAST MEDICAL HISTORY:No past medical history on file.    CURRENT MEDICATIONS:  Current Outpatient Prescriptions   Medication Sig Dispense  Refill     ASPIRIN PO Take 81 mg by mouth daily       butalbital-acetaminophen-caffeine (FIORICET/ESGIC) -40 MG per tablet Take 1 tablet by mouth every 4 hours as needed for pain 15 tablet 5     lisinopril (PRINIVIL/ZESTRIL) 5 MG tablet Take 1 tablet (5 mg) by mouth daily 90 tablet 3     furosemide (LASIX) 20 MG tablet Take 1 tablet (20 mg) by mouth daily 90 tablet 3     diazepam (VALIUM) 10 MG tablet Take 1 tablet (10 mg) by mouth 3 times daily 90 tablet 5     cyclobenzaprine (FLEXERIL) 10 MG tablet Take 1 tablet (10 mg) by mouth 3 times daily as needed for muscle spasms 90 tablet 5     fluticasone (FLONASE) 50 MCG/ACT spray Spray 2 sprays into both nostrils every 12 hours 16 g 5     Aspirin-Acetaminophen-Caffeine (EXCEDRIN PO) Take by mouth as needed       MOTRIN IB PO Take by mouth as needed for moderate pain       acyclovir (ZOVIRAX) 200 MG capsule Take one po tid (Patient not taking: Reported on 12/1/2017) 270 capsule 3     sildenafil (REVATIO/VIAGRA) 100 MG cap/tab Take 0.5-1 tablets ( mg) by mouth daily as needed for erectile dysfunction Take 30 min to 4 hours before intercourse.  Never use with nitroglycerin, terazosin or doxazosin. (Patient not taking: Reported on 12/1/2017) 12 tablet 11     PAST SURGICAL HISTORY:  No past surgical history on file.    ALLERGIES  Ultram [tramadol]; Baclofen; Imitrex [sumatriptan]; Ivp dye [contrast dye]; Keflex [cephalexin hcl]; Remeron soltab; Valtrex [valacyclovir]; and Celebrex [celecoxib]    FAMILY HX:  No family history on file.    SOCIAL HX:  Social History     Social History     Marital status: Single     Spouse name: N/A     Number of children: N/A     Years of education: N/A     Social History Main Topics     Smoking status: Former Smoker     Packs/day: 0.50     Years: 30.00     Types: Cigarettes     Smokeless tobacco: Never Used     Alcohol use No     Drug use: No     Sexual activity: Not Asked     Other Topics Concern     None     Social History  "Narrative       ROS:  Constitutional: No fever, chills, or sweats. No weight gain/loss.   HEENT: No visual disturbance, ear ache, epistaxis, sore throat.   Allergies/Immunologic: Negative.   Respiratory: No cough, hemoptysis.   Cardiovascular: As per HPI.   GI: No nausea, vomiting, hematemesis, melena, or hematochezia.   : No urinary frequency, dysuria, or hematuria.   Integument: No rash.   Psychiatric: No anxiety / depression.   Neuro: No speech disturbance, focal sensory or motor deficit.   Endocrinology: No polyuria / polyphagia.   Musculoskeletal: No myalgia.    VITAL SIGNS:  /79 (BP Location: Left arm, Patient Position: Chair, Cuff Size: Adult Regular)  Pulse 92  Ht 1.803 m (5' 11\")  Wt 88.5 kg (195 lb 1.6 oz)  SpO2 96%  BMI 27.21 kg/m2  Body mass index is 27.21 kg/(m^2).  Wt Readings from Last 2 Encounters:   01/05/18 88.5 kg (195 lb 1.6 oz)   12/01/17 87.1 kg (192 lb)       PHYSICAL EXAM  /79 (BP Location: Left arm, Patient Position: Chair, Cuff Size: Adult Regular)  Pulse 92  Ht 1.803 m (5' 11\")  Wt 88.5 kg (195 lb 1.6 oz)  SpO2 96%  BMI 27.21 kg/m2  GEN: aao x 3, NAD  Neck: JVD ~ 8 cms  LUNGS: No wheezing. +rales at both bases  HEART: S1S2 audible, no murmurs or rubs. Regular rhythm. +tachycardia  ABDOMEN: Soft, nt, +BS  EXTREMITIES: Warm calves, +DPs, no LE edema  NEURO: aao x 3, no focal deficits      LABS  Recent Labs   Lab Test  08/16/17   1419  03/05/16   1237   WBC  6.2  5.8   HGB  13.8  14.4   HCT  42.1  43.8   PLT  204  221     Recent Labs   Lab Test  08/16/17   1419  03/05/16   1237   NA  138  140   POTASSIUM  3.8  3.9   CHLORIDE  107  109   CO2  23  22   GLC  113*  101*   BUN  13  20   CR  0.94  0.83   CLIFFORD  9.1  8.8     Recent Labs   Lab Test  08/16/17   1419  12/05/14   1211  07/30/13   1445   CHOL  216*  213*  190   HDL  46  42  43   LDL  132*  130*  117   TRIG  190*  204*  148   CHOLHDLRATIO   --   5.0  4.4   NHDL  170*   --    --         ECHOCARDIOGRAM: 12/1/17 "      Interpretation Summary  Severely dilated left ventricle with severely reduced global LV systolic function, LVEF=20-25% (traced 23%.)  Normal right ventricular structure and function.  No significant valvular abnormalities are noted.  Normal inferior vena cava with normal respiratory variation (estimated RA pressure 3 mmHg.)  No pericardial effusion.  Pulmonary artery systolic pressure cannot be assessed.  Previous study not available for comparison.     Left Ventricle  Left ventricular wall thickness is normal. Severe left ventricular dilation is present. LVIDd 6.4 cm, LVEDVI 135 mL/m2. The Ejection Fraction is estimated at 20-25%. Left ventricular diastolic function is indeterminate. Severe diffuse hypokinesis is present.     Right Ventricle  Global right ventricular function is normal. The right ventricle is normal size.     Atria  Moderate left atrial enlargement is present.     Mitral Valve  Mild mitral insufficiency is present. The cause of the mitral insufficiency appears to be altered left ventricular size and geometry.        Aortic Valve  Mild aortic valve sclerosis is present.     Tricuspid Valve  The tricuspid valve is normal. The peak velocity of the tricuspid regurgitant jet is not obtainable. Pulmonary artery systolic pressure cannot be assessed.     Pulmonic Valve  The pulmonic valve is normal. Trace pulmonic insufficiency is present.     Vessels  The aorta root is normal. The thoracic aorta is normal. The pulmonary artery is normal. The inferior vena cava was normal in size with preserved respiratory variability. Estimated mean right atrial pressure is 3 mmHg.     Pericardium  No pericardial effusion is present.        Compared to Previous Study  Previous study not available for comparison.     MMode/2D Measurements & Calculations  IVSd: 0.67 cm  LVIDd: 6.4 cm  LVIDs: 5.6 cm  LVPWd: 0.74 cm  FS: 12.6 %  EDV(Teich): 207.5 ml  ESV(Teich): 152.3 ml  LV mass(C)d: 178.8 grams  LV mass(C)dI: 90.0  grams/m2  Ao root diam: 3.5 cm  asc Aorta Diam: 3.1 cm  LVOT diam: 2.1 cm  LVOT area: 3.3 cm2  EF(MOD-bp): 23.4 %  LA Volume (BP): 86.3 ml  LA Volume Index (BP): 43.4 ml/m2  RWT: 0.23  Doppler Measurements & Calculations  MV E max marcus: 103.4 cm/sec  MV A max marcus: 102.3 cm/sec  MV E/A: 1.0  MV dec time: 0.13 sec  PA acc time: 0.10 sec  E/E' av.0  Lateral E/e': 16.7  Medial E/e': 9.4    ASSESSMENT AND PLAN:   - 57M, PMH of Hodgkin's lymphoma (diagnosed , s/p MOPP chemo and radiation, achieved remission), prior smoking, anxiety, depression and dilated cardiomyopathy who presents to establish care for his cardiomyopathy.  - Stage C, NYHA Class III  - The etiology of his HF is likely non ischemic (possibly chemotherapy mediated), however there is no ischemia evaluation available at this time.  - He appears mildly volume up today. However, given his chronic untreated heart failure, his exam may not appear as bad as his true volume status    - Continue Lisinopril 5 mg daily  - Continue Lasix 20 mg daily (diuretic naive): Improved NT pro BNP level.  - Start coreg 3.125 mg PO BID for 2 weeks; increase to 3.125 mg PO QAM and 6.25 mg PO QPM for 2 weeks; increase to 6.25 mg PO BID.  - f/u in [facility] clinic and follow up with us in 1 month.     Axel Blanco MD, PhD    CC  Patient Care Team:  Donald Baum MD as PCP - General (Family Practice)  Mary Jane Macario APRN CNS as Nurse Practitioner (Clinical Nurse Specialist)  DONALD BAUM

## 2018-01-05 NOTE — PROGRESS NOTES
CARDIOLOGY NEW OFFICE VISIT    HPI: Lul Arriola is a 57 year old male being seen today for evaluation of Cardiomyopathy.   He has a PMH of Hodgkin's Lymphoma (diagnosed 1993, s/p MOPP chemo and radiation, achieved remission), prior smoking (30 pack years, quit two years ago), anxiety, depression and dilated cardiomyopathy who presents to establish care for his cardiomyopathy.    He says he was informed about 20 years ago (after chemotherapy) that he had a weak heart function, however doesn't recall an EF number.     He was doing fine until Feb 2016, when he was in [place] and suddenly started experiencing SOB, palpitations and diaphoresis. He went to a local hospital, was told his EF is about 20%, he didn't have an angiogram (and is not sure if he had any non invasive ischemia eval). He says he was told he shouldnt leave the hospital without a vest that would shock him if he were to go into an abnormal rhythm, however he left AMA.    Currently, he complains of severe dyspnea on minimal exertion, says he is huffing and puffing even while moving around the house. Denies any chest pain, pressure, heaviness or tightness. He does endorse significant abdominal bloating, frequent palpitations, and frequent dizziness that seems to occur when he stands up. He denies early satiety. Notably, whenever he measures his HR its in the 90's (at rest).    He has not had regular coordinated cardiac care in the past. He is visibly frustrated with the healthcare system.    PAST MEDICAL HISTORY:No past medical history on file.    CURRENT MEDICATIONS:  Current Outpatient Prescriptions   Medication Sig Dispense Refill     ASPIRIN PO Take 81 mg by mouth daily       butalbital-acetaminophen-caffeine (FIORICET/ESGIC) -40 MG per tablet Take 1 tablet by mouth every 4 hours as needed for pain 15 tablet 5     lisinopril (PRINIVIL/ZESTRIL) 5 MG tablet Take 1 tablet (5 mg) by mouth daily 90 tablet 3     furosemide (LASIX) 20 MG tablet  Take 1 tablet (20 mg) by mouth daily 90 tablet 3     diazepam (VALIUM) 10 MG tablet Take 1 tablet (10 mg) by mouth 3 times daily 90 tablet 5     cyclobenzaprine (FLEXERIL) 10 MG tablet Take 1 tablet (10 mg) by mouth 3 times daily as needed for muscle spasms 90 tablet 5     fluticasone (FLONASE) 50 MCG/ACT spray Spray 2 sprays into both nostrils every 12 hours 16 g 5     Aspirin-Acetaminophen-Caffeine (EXCEDRIN PO) Take by mouth as needed       MOTRIN IB PO Take by mouth as needed for moderate pain       acyclovir (ZOVIRAX) 200 MG capsule Take one po tid (Patient not taking: Reported on 12/1/2017) 270 capsule 3     sildenafil (REVATIO/VIAGRA) 100 MG cap/tab Take 0.5-1 tablets ( mg) by mouth daily as needed for erectile dysfunction Take 30 min to 4 hours before intercourse.  Never use with nitroglycerin, terazosin or doxazosin. (Patient not taking: Reported on 12/1/2017) 12 tablet 11     PAST SURGICAL HISTORY:  No past surgical history on file.    ALLERGIES  Ultram [tramadol]; Baclofen; Imitrex [sumatriptan]; Ivp dye [contrast dye]; Keflex [cephalexin hcl]; Remeron soltab; Valtrex [valacyclovir]; and Celebrex [celecoxib]    FAMILY HX:  No family history on file.    SOCIAL HX:  Social History     Social History     Marital status: Single     Spouse name: N/A     Number of children: N/A     Years of education: N/A     Social History Main Topics     Smoking status: Former Smoker     Packs/day: 0.50     Years: 30.00     Types: Cigarettes     Smokeless tobacco: Never Used     Alcohol use No     Drug use: No     Sexual activity: Not Asked     Other Topics Concern     None     Social History Narrative       ROS:  Constitutional: No fever, chills, or sweats. No weight gain/loss.   HEENT: No visual disturbance, ear ache, epistaxis, sore throat.   Allergies/Immunologic: Negative.   Respiratory: No cough, hemoptysis.   Cardiovascular: As per HPI.   GI: No nausea, vomiting, hematemesis, melena, or hematochezia.   : No  "urinary frequency, dysuria, or hematuria.   Integument: No rash.   Psychiatric: No anxiety / depression.   Neuro: No speech disturbance, focal sensory or motor deficit.   Endocrinology: No polyuria / polyphagia.   Musculoskeletal: No myalgia.    VITAL SIGNS:  /79 (BP Location: Left arm, Patient Position: Chair, Cuff Size: Adult Regular)  Pulse 92  Ht 1.803 m (5' 11\")  Wt 88.5 kg (195 lb 1.6 oz)  SpO2 96%  BMI 27.21 kg/m2  Body mass index is 27.21 kg/(m^2).  Wt Readings from Last 2 Encounters:   01/05/18 88.5 kg (195 lb 1.6 oz)   12/01/17 87.1 kg (192 lb)       PHYSICAL EXAM  /79 (BP Location: Left arm, Patient Position: Chair, Cuff Size: Adult Regular)  Pulse 92  Ht 1.803 m (5' 11\")  Wt 88.5 kg (195 lb 1.6 oz)  SpO2 96%  BMI 27.21 kg/m2  GEN: aao x 3, NAD  Neck: JVD ~ 8 cms  LUNGS: No wheezing. +rales at both bases  HEART: S1S2 audible, no murmurs or rubs. Regular rhythm. +tachycardia  ABDOMEN: Soft, nt, +BS  EXTREMITIES: Warm calves, +DPs, no LE edema  NEURO: aao x 3, no focal deficits      LABS  Recent Labs   Lab Test  08/16/17   1419  03/05/16   1237   WBC  6.2  5.8   HGB  13.8  14.4   HCT  42.1  43.8   PLT  204  221     Recent Labs   Lab Test  08/16/17   1419  03/05/16   1237   NA  138  140   POTASSIUM  3.8  3.9   CHLORIDE  107  109   CO2  23  22   GLC  113*  101*   BUN  13  20   CR  0.94  0.83   CLIFFORD  9.1  8.8     Recent Labs   Lab Test  08/16/17   1419  12/05/14   1211  07/30/13   1445   CHOL  216*  213*  190   HDL  46  42  43   LDL  132*  130*  117   TRIG  190*  204*  148   CHOLHDLRATIO   --   5.0  4.4   NHDL  170*   --    --         ECHOCARDIOGRAM: 12/1/17      Interpretation Summary  Severely dilated left ventricle with severely reduced global LV systolic function, LVEF=20-25% (traced 23%.)  Normal right ventricular structure and function.  No significant valvular abnormalities are noted.  Normal inferior vena cava with normal respiratory variation (estimated RA pressure 3 mmHg.)  No " pericardial effusion.  Pulmonary artery systolic pressure cannot be assessed.  Previous study not available for comparison.     Left Ventricle  Left ventricular wall thickness is normal. Severe left ventricular dilation is present. LVIDd 6.4 cm, LVEDVI 135 mL/m2. The Ejection Fraction is estimated at 20-25%. Left ventricular diastolic function is indeterminate. Severe diffuse hypokinesis is present.     Right Ventricle  Global right ventricular function is normal. The right ventricle is normal size.     Atria  Moderate left atrial enlargement is present.     Mitral Valve  Mild mitral insufficiency is present. The cause of the mitral insufficiency appears to be altered left ventricular size and geometry.        Aortic Valve  Mild aortic valve sclerosis is present.     Tricuspid Valve  The tricuspid valve is normal. The peak velocity of the tricuspid regurgitant jet is not obtainable. Pulmonary artery systolic pressure cannot be assessed.     Pulmonic Valve  The pulmonic valve is normal. Trace pulmonic insufficiency is present.     Vessels  The aorta root is normal. The thoracic aorta is normal. The pulmonary artery is normal. The inferior vena cava was normal in size with preserved respiratory variability. Estimated mean right atrial pressure is 3 mmHg.     Pericardium  No pericardial effusion is present.        Compared to Previous Study  Previous study not available for comparison.     MMode/2D Measurements & Calculations  IVSd: 0.67 cm  LVIDd: 6.4 cm  LVIDs: 5.6 cm  LVPWd: 0.74 cm  FS: 12.6 %  EDV(Teich): 207.5 ml  ESV(Teich): 152.3 ml  LV mass(C)d: 178.8 grams  LV mass(C)dI: 90.0 grams/m2  Ao root diam: 3.5 cm  asc Aorta Diam: 3.1 cm  LVOT diam: 2.1 cm  LVOT area: 3.3 cm2  EF(MOD-bp): 23.4 %  LA Volume (BP): 86.3 ml  LA Volume Index (BP): 43.4 ml/m2  RWT: 0.23  Doppler Measurements & Calculations  MV E max marcus: 103.4 cm/sec  MV A max marcus: 102.3 cm/sec  MV E/A: 1.0  MV dec time: 0.13 sec  PA acc time: 0.10  sec  E/E' av.0  Lateral E/e': 16.7  Medial E/e': 9.4    ASSESSMENT AND PLAN:   - 57M, PMH of Hodgkin's lymphoma (diagnosed , s/p MOPP chemo and radiation, achieved remission), prior smoking, anxiety, depression and dilated cardiomyopathy who presents to establish care for his cardiomyopathy.  - Stage C, NYHA Class III  - The etiology of his HF is likely non ischemic (possibly chemotherapy mediated), however there is no ischemia evaluation available at this time.  - He appears mildly volume up today. However, given his chronic untreated heart failure, his exam may not appear as bad as his true volume status    - Continue Lisinopril 5 mg daily  - Continue Lasix 20 mg daily (diuretic naive): Improved NT pro BNP level.  - Start coreg 3.125 mg PO BID for 2 weeks; increase to 3.125 mg PO QAM and 6.25 mg PO QPM for 2 weeks; increase to 6.25 mg PO BID.  - f/u in [facility] clinic and follow up with us in 1 month.     Axel Blanco MD, PhD    CC  Patient Care Team:  Patricia Baum MD as PCP - General (Family Practice)  Mary Jane Macario APRN CNS as Nurse Practitioner (Clinical Nurse Specialist)  PATRICIA BAUM

## 2018-02-22 ENCOUNTER — PRE VISIT (OUTPATIENT)
Dept: CARDIOLOGY | Facility: CLINIC | Age: 58
End: 2018-02-22

## 2018-03-09 ENCOUNTER — OFFICE VISIT (OUTPATIENT)
Dept: PSYCHIATRY | Facility: CLINIC | Age: 58
End: 2018-03-09
Attending: CLINICAL NURSE SPECIALIST
Payer: MEDICARE

## 2018-03-09 VITALS
BODY MASS INDEX: 27.7 KG/M2 | WEIGHT: 198.6 LBS | HEART RATE: 75 BPM | DIASTOLIC BLOOD PRESSURE: 75 MMHG | SYSTOLIC BLOOD PRESSURE: 120 MMHG

## 2018-03-09 DIAGNOSIS — F41.1 ANXIETY STATE: Primary | ICD-10-CM

## 2018-03-09 DIAGNOSIS — F34.1 DYSTHYMIA: ICD-10-CM

## 2018-03-09 PROCEDURE — G0463 HOSPITAL OUTPT CLINIC VISIT: HCPCS | Mod: ZF

## 2018-03-09 RX ORDER — DIAZEPAM 10 MG
10 TABLET ORAL 3 TIMES DAILY
Qty: 90 TABLET | Refills: 5 | Status: SHIPPED | OUTPATIENT
Start: 2018-03-09 | End: 2018-09-07

## 2018-03-09 ASSESSMENT — PAIN SCALES - GENERAL: PAINLEVEL: MODERATE PAIN (4)

## 2018-03-09 NOTE — PROGRESS NOTES
"  Outpatient Psychiatry Progress Note     Provider: BRENT Payne CNS  Date: 3/9/2018  Service:  Medication follow up with counseling.   Patient Identification: Lul Arriola  : 1960   MRN: [id number]    Lul Arriola is a 57 year old year old male who presents for ongoing psychiatric care.  Lul Arriola was last seen in clinic on 9/15/18.   At that time,   Assessment & Plan       Lul Arriola is seen today for follow up and reports he has been fatigued due to health problems which makes it difficult for him to have much interest or energy in things.  He would like to continue current medication and is concerned about side effects of trying anything new.     Diagnosis   Anxiety, Dysthymia     Plan:  Medication: Continue current medications  OTC Recommendations: none  Lab Orders:  none  Referrals: none  Release of Information: none  Future Treatment Considerations:per patient  Return for Follow Up:6 months      ____________________________________________________________________________________________________________________________________________    2018  Today Lul reports he is feeling \"fair\".  Diagnosed with heart failure in December. Was told that eventually he will need a heart transplant at some time but first started medications next will be pacemaker then transplant.  No improvement in functioning with the medications he is taking for HF.    Side effects of medication include: none  Psychiatric Review of Systems:. Depression: In the last 2 weeks per PHQ 9 several days anhedonia. More than 1/2 days fatigue.  Anxiety : stable  [place] none   Psychosis  none.   ADHD n/a    Review of Medical Systems:  Sleep: no concerns  Energy: moderate  Concentration: stable  Appetite: stable  GI Concerns: none  Cardiac concerns: none  Neurological concerns: none  Other medical concerns: no new concerns  Current Substance Use:  Alcohol:denies use  Other " drugs:denies  Caffeine:no change  Nicotine: quit 2 years ago  Past Medical History: No past medical history on file.  Patient Active Problem List   Diagnosis     Anxiety state     Dysthymia       Allergies:   Allergies   Allergen Reactions     Ultram [Tramadol] Nausea     Baclofen      Imitrex [Sumatriptan] Nausea     Ivp Dye [Contrast Dye] Nausea and Vomiting     N & V, eyes, nose, throat swelled as a child     Keflex [Cephalexin Hcl]      Remeron Soltab      Valtrex [Valacyclovir] Other (See Comments)     Stomach pains     Celebrex [Celecoxib] Palpitations          Current Medications     Current Outpatient Prescriptions Ordered in [facility]   Medication Sig Dispense Refill     carvedilol (COREG) 3.125 MG tablet Take 2 tablets (6.25 mg) by mouth 2 times daily (with meals) 240 tablet 1     ASPIRIN PO Take 81 mg by mouth daily       lisinopril (PRINIVIL/ZESTRIL) 5 MG tablet Take 1 tablet (5 mg) by mouth daily 90 tablet 3     furosemide (LASIX) 20 MG tablet Take 1 tablet (20 mg) by mouth daily 90 tablet 3     diazepam (VALIUM) 10 MG tablet Take 1 tablet (10 mg) by mouth 3 times daily 90 tablet 5     cyclobenzaprine (FLEXERIL) 10 MG tablet Take 1 tablet (10 mg) by mouth 3 times daily as needed for muscle spasms 90 tablet 5     Aspirin-Acetaminophen-Caffeine (EXCEDRIN PO) Take by mouth as needed       MOTRIN IB PO Take by mouth as needed for moderate pain       butalbital-acetaminophen-caffeine (FIORICET/ESGIC) -40 MG per tablet Take 1 tablet by mouth every 4 hours as needed for pain (Patient not taking: Reported on 3/9/2018) 15 tablet 5     acyclovir (ZOVIRAX) 200 MG capsule Take one po tid (Patient not taking: Reported on 12/1/2017) 270 capsule 3     sildenafil (REVATIO/VIAGRA) 100 MG cap/tab Take 0.5-1 tablets ( mg) by mouth daily as needed for erectile dysfunction Take 30 min to 4 hours before intercourse.  Never use with nitroglycerin, terazosin or doxazosin. (Patient not taking: Reported on 12/1/2017) 12  "tablet 11     fluticasone (FLONASE) 50 MCG/ACT spray Spray 2 sprays into both nostrils every 12 hours (Patient not taking: Reported on 3/9/2018) 16 g 5     No current Epic-ordered facility-administered medications on file.         Mental Status Exam     Appearance:  Casually dressed and Adequately groomed  Behavior/relationship to examiner/demeanor:  Cooperative  Orientation: Oriented to person, place, time and situation  Psychomotor: normal form  Speech Rate:  Normal  Speech Spontaneity:  Normal  Mood:  \"fair\"  Affect:  Appropriate/mood-congruent  Thought Process (Associations):  Goal directed  Thought Content:  no overt psychosis, denies suicidal ideation, intent or thoughts and patient does not appear to be responding to internal stimuli  Abnormal Perception:  None  Attention/Concentration:  Normal  Insight:  Adequate  Judgment:  Adequate for safety      Results     Vital signs: /75  Pulse 75  Wt 90.1 kg (198 lb 9.6 oz)  BMI 27.7 kg/m2    Laboratory Data:  reviewed    Assessment & Plan      Lul Arriola is seen today for follow up and reports his mood has been stable. Refuses trials of SSRI/SSNRI for anxiety    Diagnosis  Anxiety, Dysthymia    Plan:  Medication: continue current medication  OTC Recommendations: none  Lab Orders:  none  Referrals: none  Release of Information: none  Future Treatment Considerations:per patient agreement to other trials  Return for Follow Up:6 months   The risks, benefits, alternatives and side effects have been discussed and are understood by the patient. The patient understands the risks of using street drugs or alcohol. There are no medical contraindications, the patient agrees to treatment, and has the capacity to do so. The patient understands to call 911 or come to the nearest ED if life threatening or urgent symptoms present.  In addition time was spent counseling the patient and/or coordinating care regarding review of social and occupational functioning.  In " addition patient was counseled on health and wellness practices to augment medication treatment of symptoms. See note for details.    Mary Jane Macario, APRN CNS 3/9/2018

## 2018-03-09 NOTE — MR AVS SNAPSHOT
After Visit Summary   3/9/2018    Lul Arriola    MRN: [id number]           Patient Information     Date Of Birth          1960        Visit Information        Provider Department      3/9/2018 1:15 PM Mary Jane Macario APRN CNS [facility]        Today's Diagnoses     Anxiety state    -  1    Dysthymia           Follow-ups after your visit        Follow-up notes from your care team     Return in about 6 months (around 2018).      Your next 10 appointments already scheduled     Mar 23, 2018 11:30 AM CDT   (Arrive by 11:15 AM)   RETURN HEART FAILURE with Axel Blanco MD   [facility] ([email or web address] and [facility] [place])    [address]  [address] [place] [address] [phone]            Sep 07, 2018  1:15 PM CDT   Adult Med Follow UP with BRENT Payne CNS   Psychiatry Clinic ([facility])    [place] [id number]  [address] [address] [phone]              Who to contact     Please call your clinic at [phone] to:    Ask questions about your health    Make or cancel appointments    Discuss your medicines    Learn about your test results    Speak to your doctor            Additional Information About Your Visit        MyChart Information     [facility] is an electronic gateway that provides easy, online access to your medical records. With [employer], you can request a clinic appointment, read your test results, renew a prescription or communicate with your care team.     To sign up for [facility] visit the website at [email or web address]   You will be asked to enter the access code listed below, as well as some personal information. Please follow the directions to create your username and password.     Your access code is: [email or web address]  Expires: 2018  7:30 AM     Your access code will  in 90 days. If you need help or a new code, please contact your [facility] or call [phone] for assistance.        Care EveryWhere ID     This is your Care EveryWhere ID. This could be used by other organizations to access your [place] medical records  [id number]        Your Vitals Were     Pulse BMI (Body Mass Index)                75 27.7 kg/m2           Blood Pressure from Last 3 Encounters:   03/09/18 120/75   01/05/18 127/79   12/01/17 135/84    Weight from Last 3 Encounters:   03/09/18 90.1 kg (198 lb 9.6 oz)   01/05/18 88.5 kg (195 lb 1.6 oz)   12/01/17 87.1 kg (192 lb)              Today, you had the following     No orders found for display         Where to get your medicines      Some of these will need a paper prescription and others can be bought over the counter.  Ask your nurse if you have questions.     Bring a paper prescription for each of these medications     diazepam 10 MG tablet          Primary Care Provider Office Phone # Fax #    Donald Lorenz [phone]       [address] [place] [address]        Equal Access to Services     [facility] : Hadii lars Rios, waaxda barry, qaybta karine waterman. [facility] [phone].    ATENCIÓN: Si habla español, tiene a daniel disposición servicios gratuitos de asistencia lingüística. Anisha al [phone].    We comply with applicable federal civil rights laws and [place] laws. We do not discriminate on the basis of race, color, national origin, age, disability, sex, sexual orientation, or gender identity.            Thank you!     Thank you for choosing PSY[facility]IATRY CLINIC  for your care. Our goal is always to provide you with excellent care. Hearing back from our patients is one way we can continue to improve our services. Please take a few minutes to complete the written survey that you may receive in the mail after your visit with us. Thank you!             Your Updated Medication List - Protect  others around you: Learn how to safely use, store and throw away your medicines at www.disposemymeds.org.          This list is accurate as of 3/9/18 11:59 PM.  Always use your most recent med list.                   Brand Name Dispense Instructions for use Diagnosis    acyclovir 200 MG capsule    ZOVIRAX    270 capsule    Take one po tid    HSV (herpes simplex virus) infection       ASPIRIN PO      Take 81 mg by mouth daily        butalbital-acetaminophen-caffeine -40 MG per tablet    FIORICET/ESGIC    15 tablet    Take 1 tablet by mouth every 4 hours as needed for pain    Neck pain, Tension headache       carvedilol 3.125 MG tablet    COREG    240 tablet    Take 2 tablets (6.25 mg) by mouth 2 times daily (with meals)    Secondary cardiomyopathy (H)       cyclobenzaprine 10 MG tablet    FLEXERIL    90 tablet    Take 1 tablet (10 mg) by mouth 3 times daily as needed for muscle spasms    Neck pain       diazepam 10 MG tablet    VALIUM    90 tablet    Take 1 tablet (10 mg) by mouth 3 times daily    Anxiety state       EXCEDRIN PO      Take by mouth as needed        fluticasone 50 MCG/ACT spray    FLONASE    16 g    Spray 2 sprays into both nostrils every 12 hours    Chronic allergic rhinitis, unspecified seasonality, unspecified trigger       furosemide 20 MG tablet    LASIX    90 tablet    Take 1 tablet (20 mg) by mouth daily    Systolic congestive heart failure, unspecified congestive heart failure chronicity (H), Benign essential hypertension       lisinopril 5 MG tablet    PRINIVIL/ZESTRIL    90 tablet    Take 1 tablet (5 mg) by mouth daily    Systolic congestive heart failure, unspecified congestive heart failure chronicity (H), Benign essential hypertension       MOTRIN IB PO      Take by mouth as needed for moderate pain        sildenafil 100 MG tablet    VIAGRA    12 tablet    Take 0.5-1 tablets ( mg) by mouth daily as needed for erectile dysfunction Take 30 min to 4 hours before intercourse.   Never use with nitroglycerin, terazosin or doxazosin.    Other male erectile dysfunction

## 2018-03-09 NOTE — NURSING NOTE
Chief Complaint   Patient presents with     Recheck Medication     anxiety state     Reviewed allergies, medications, pharmacy and smoking status.  Obtained weight, pain level, blood pressure and heart rate

## 2018-03-23 ENCOUNTER — OFFICE VISIT (OUTPATIENT)
Dept: CARDIOLOGY | Facility: CLINIC | Age: 58
End: 2018-03-23
Attending: INTERNAL MEDICINE
Payer: MEDICARE

## 2018-03-23 VITALS
OXYGEN SATURATION: 98 % | DIASTOLIC BLOOD PRESSURE: 77 MMHG | BODY MASS INDEX: 27.16 KG/M2 | WEIGHT: 194 LBS | HEIGHT: 71 IN | SYSTOLIC BLOOD PRESSURE: 117 MMHG | HEART RATE: 87 BPM

## 2018-03-23 DIAGNOSIS — I42.9 SECONDARY CARDIOMYOPATHY (H): ICD-10-CM

## 2018-03-23 DIAGNOSIS — I42.8 CARDIOMYOPATHY, NONISCHEMIC (H): Primary | ICD-10-CM

## 2018-03-23 PROCEDURE — 99214 OFFICE O/P EST MOD 30 MIN: CPT | Mod: ZP | Performed by: INTERNAL MEDICINE

## 2018-03-23 PROCEDURE — G0463 HOSPITAL OUTPT CLINIC VISIT: HCPCS | Mod: ZF

## 2018-03-23 RX ORDER — CARVEDILOL 12.5 MG/1
12.5 TABLET ORAL 2 TIMES DAILY WITH MEALS
Qty: 180 TABLET | Refills: 3 | Status: SHIPPED | OUTPATIENT
Start: 2018-03-23 | End: 2019-01-15

## 2018-03-23 RX ORDER — CARVEDILOL 3.12 MG/1
9.38 TABLET ORAL 2 TIMES DAILY WITH MEALS
Qty: 180 TABLET | Refills: 1 | Status: SHIPPED | OUTPATIENT
Start: 2018-03-23 | End: 2019-11-04

## 2018-03-23 ASSESSMENT — PAIN SCALES - GENERAL: PAINLEVEL: NO PAIN (0)

## 2018-03-23 NOTE — NURSING NOTE
Med Reconcile: Reviewed and verified all current medications with the patient. The updated medication list was printed and given to the patient.  Return Appointment: Follow up in 3 months.Patient given instructions regarding scheduling next clinic visit. Patient demonstrated understanding of this information and agreed to call with further questions or concerns.  Medication Change: See up titration schedule in patient's instructions.Patient was educated regarding prescribed medication change, including discussion of the indication, administration, side effects, and when to report to MD or RN. Patient demonstrated understanding of this information and agreed to call with further questions or concerns.  Patient stated he understood all health information given and agreed to call with further questions or concerns.

## 2018-03-23 NOTE — NURSING NOTE
Chief Complaint   Patient presents with     Follow Up For     Cardiomyopathy     Vitals were taken and medications were reconciled.     Erika Marcum MA    11:15 AM

## 2018-03-23 NOTE — LETTER
3/23/2018      RE: Lul Arriola  [address] [address]       Dear Colleague,    Thank you for the opportunity to participate in the care of your patient, Lul Arriola, at the [facility]. Please see a copy of my visit note below.    CARDIOLOGY NEW OFFICE VISIT    HPI: Lul Arriola is a 57 year old male seen today in follow up of cardiomyopathy secondary to cancer treatment.  He has a PMH of Hodgkin's Lymphoma (diagnosed 1993, s/p MOPP chemo and radiation, achieved remission), prior smoking (30 pack years, quit two years ago), anxiety, depression and dilated cardiomyopathy who initially presented to establish care for his cardiomyopathy.    He says he was informed about 20 years ago (after chemotherapy) that he had a weak heart function, however doesn't recall an EF number.     He was doing fine until Feb 2016, when he was in [place] and suddenly started experiencing SOB, palpitations and diaphoresis. He went to a local hospital, was told his EF is about 20%, he didn't have an angiogram (and is not sure if he had any non invasive ischemia eval). He says he was told he shouldnt leave the hospital without a vest that would shock him if he were to go into an abnormal rhythm, however he left AMA.    Currently, he complains of severe dyspnea on minimal exertion, says he is huffing and puffing even while moving around the house. Denies any chest pain, pressure, heaviness or tightness. He does endorse significant abdominal bloating, frequent palpitations, and frequent dizziness that seems to occur when he stands up. He denies early satiety. Notably, whenever he measures his HR its in the 90's (at rest).    He has not had regular coordinated cardiac care in the past. He is visibly frustrated with the healthcare system.    PAST MEDICAL HISTORY:No past medical history on file.    CURRENT MEDICATIONS:  Current Outpatient  Prescriptions   Medication Sig Dispense Refill     diazepam (VALIUM) 10 MG tablet Take 1 tablet (10 mg) by mouth 3 times daily 90 tablet 5     carvedilol (COREG) 3.125 MG tablet Take 2 tablets (6.25 mg) by mouth 2 times daily (with meals) 240 tablet 1     ASPIRIN PO Take 81 mg by mouth daily       butalbital-acetaminophen-caffeine (FIORICET/ESGIC) -40 MG per tablet Take 1 tablet by mouth every 4 hours as needed for pain 15 tablet 5     lisinopril (PRINIVIL/ZESTRIL) 5 MG tablet Take 1 tablet (5 mg) by mouth daily 90 tablet 3     furosemide (LASIX) 20 MG tablet Take 1 tablet (20 mg) by mouth daily 90 tablet 3     acyclovir (ZOVIRAX) 200 MG capsule Take one po tid 270 capsule 3     cyclobenzaprine (FLEXERIL) 10 MG tablet Take 1 tablet (10 mg) by mouth 3 times daily as needed for muscle spasms 90 tablet 5     Aspirin-Acetaminophen-Caffeine (EXCEDRIN PO) Take by mouth as needed       sildenafil (REVATIO/VIAGRA) 100 MG cap/tab Take 0.5-1 tablets ( mg) by mouth daily as needed for erectile dysfunction Take 30 min to 4 hours before intercourse.  Never use with nitroglycerin, terazosin or doxazosin. (Patient not taking: Reported on 12/1/2017) 12 tablet 11     fluticasone (FLONASE) 50 MCG/ACT spray Spray 2 sprays into both nostrils every 12 hours (Patient not taking: Reported on 3/23/2018) 16 g 5     MOTRIN IB PO Take by mouth as needed for moderate pain       PAST SURGICAL HISTORY:  No past surgical history on file.    ALLERGIES  Ultram [tramadol]; Baclofen; Imitrex [sumatriptan]; Ivp dye [contrast dye]; Keflex [cephalexin hcl]; Remeron soltab; Valtrex [valacyclovir]; and Celebrex [celecoxib]    FAMILY HX:  No family history on file.    SOCIAL HX:  Social History     Social History     Marital status: Single     Spouse name: N/A     Number of children: N/A     Years of education: N/A     Social History Main Topics     Smoking status: Former Smoker     Packs/day: 0.50     Years: 30.00     Types: Cigarettes      "Smokeless tobacco: Never Used     Alcohol use No     Drug use: No     Sexual activity: Not Asked     Other Topics Concern     None     Social History Narrative       ROS:  Constitutional: No fever, chills, or sweats. No weight gain/loss.   HEENT: No visual disturbance, ear ache, epistaxis, sore throat.   Allergies/Immunologic: Negative.   Respiratory: No cough, hemoptysis.   Cardiovascular: As per HPI.   GI: No nausea, vomiting, hematemesis, melena, or hematochezia.   : No urinary frequency, dysuria, or hematuria.   Integument: No rash.   Psychiatric: No anxiety / depression.   Neuro: No speech disturbance, focal sensory or motor deficit.   Endocrinology: No polyuria / polyphagia.   Musculoskeletal: No myalgia.    VITAL SIGNS:  /77 (BP Location: Left arm, Patient Position: Chair, Cuff Size: Adult Regular)  Pulse 87  Ht 1.803 m (5' 11\")  Wt 88 kg (194 lb)  SpO2 98%  BMI 27.06 kg/m2  Body mass index is 27.06 kg/(m^2).  Wt Readings from Last 2 Encounters:   03/23/18 88 kg (194 lb)   01/05/18 88.5 kg (195 lb 1.6 oz)       PHYSICAL EXAM  /77 (BP Location: Left arm, Patient Position: Chair, Cuff Size: Adult Regular)  Pulse 87  Ht 1.803 m (5' 11\")  Wt 88 kg (194 lb)  SpO2 98%  BMI 27.06 kg/m2  GEN: aao x 3, NAD  Neck: JVD ~ 8 cms  LUNGS: No wheezing. +rales at both bases  HEART: S1S2 audible, no murmurs or rubs. Regular rhythm. +tachycardia  ABDOMEN: Soft, nt, +BS  EXTREMITIES: Warm calves, +DPs, no LE edema  NEURO: aao x 3, no focal deficits      LABS  Recent Labs   Lab Test  08/16/17   1419  03/05/16   1237   WBC  6.2  5.8   HGB  13.8  14.4   HCT  42.1  43.8   PLT  204  221     Recent Labs   Lab Test  08/16/17   1419  03/05/16   1237   NA  138  140   POTASSIUM  3.8  3.9   CHLORIDE  107  109   CO2  23  22   GLC  113*  101*   BUN  13  20   CR  0.94  0.83   CLIFFORD  9.1  8.8     Recent Labs   Lab Test  08/16/17   1419  12/05/14   1211  07/30/13   1445   CHOL  216*  213*  190   HDL  46  42  43   LDL  132* "  130*  117   TRIG  190*  204*  148   CHOLHDLRATIO   --   5.0  4.4   NHDL  170*   --    --         ECHOCARDIOGRAM: 12/1/17   Severely dilated left ventricle with severely reduced global LV systolic function, LVEF=20-25% (traced 23%.)  Normal right ventricular structure and function.  No significant valvular abnormalities are noted.  Normal inferior vena cava with normal respiratory variation (estimated RA pressure 3 mmHg.)  No pericardial effusion.  Pulmonary artery systolic pressure cannot be assessed.  Previous study not available for comparison.     Left Ventricle  Left ventricular wall thickness is normal. Severe left ventricular dilation is present. LVIDd 6.4 cm, LVEDVI 135 mL/m2. The Ejection Fraction is estimated at 20-25%. Left ventricular diastolic function is indeterminate. Severe diffuse hypokinesis is present.     Right Ventricle  Global right ventricular function is normal. The right ventricle is normal size.     Atria  Moderate left atrial enlargement is present.     Mitral Valve  Mild mitral insufficiency is present. The cause of the mitral insufficiency appears to be altered left ventricular size and geometry.        Aortic Valve  Mild aortic valve sclerosis is present.     Tricuspid Valve  The tricuspid valve is normal. The peak velocity of the tricuspid regurgitant jet is not obtainable. Pulmonary artery systolic pressure cannot be assessed.     Pulmonic Valve  The pulmonic valve is normal. Trace pulmonic insufficiency is present.     Vessels  The aorta root is normal. The thoracic aorta is normal. The pulmonary artery is normal. The inferior vena cava was normal in size with preserved respiratory variability. Estimated mean right atrial pressure is 3 mmHg.     Pericardium  No pericardial effusion is present.        Compared to Previous Study  Previous study not available for comparison.    IVSd: 0.67 cm  LVIDd: 6.4 cm  LVIDs: 5.6 cm  LVPWd: 0.74 cm  FS: 12.6 %  EDV(Teich): 207.5 ml  ESV(Teich):  152.3 ml  LV mass(C)d: 178.8 grams  LV mass(C)dI: 90.0 grams/m2  Ao root diam: 3.5 cm  asc Aorta Diam: 3.1 cm  LVOT diam: 2.1 cm  LVOT area: 3.3 cm2  EF(MOD-bp): 23.4 %  LA Volume (BP): 86.3 ml  LA Volume Index (BP): 43.4 ml/m2  RWT: 0.23  MV E max marcus: 103.4 cm/sec  MV A max marcus: 102.3 cm/sec  MV E/A: 1.0  MV dec time: 0.13 sec  PA acc time: 0.10 sec  E/E' av.0  Lateral E/e': 16.7  Medial E/e': 9.4    ASSESSMENT AND PLAN:   - 57M, PMH of Hodgkin's lymphoma (diagnosed , s/p MOPP chemo and radiation, achieved remission), prior smoking, anxiety, depression and dilated cardiomyopathy who presents to establish care for his cardiomyopathy.  - Stage C, NYHA Class III  - The etiology of his HF is likely non ischemic (possibly chemotherapy mediated), however there is no ischemia evaluation available at this time.  - He is euvolemic today.   - He reluctantly accepted to try medical treatment. I have been able to get him to take lisinopril and I have been up titrating coreg. He feels well and has no side effects. He is willing to continue the up titration.  - I spoke to him again about AICD. He continues to not be interested.    - Continue Lisinopril 5 mg daily  - Continue Lasix 20 mg daily (diuretic naive): Improved NT pro BNP level.  - Continue coreg uptitration; increase to 6.25 mg PO QAM and 9.125 mg PO QPM for 2 weeks; increase to 9.125 mg PO BID; increase to 9.125 mg PO QAM and 12.5 mg PO QPM; then 12.5 mg PO BID.  - f/u in [facility] and follow up with us in 3 months.     Axel Blanco MD, PhD    CC  Patient Care Team:  Donald Lorenz MD as PCP - General (Family Practice)  Mary Jane Macario APRN CNS as Nurse Practitioner (Clinical Nurse Specialist)  Grzegorz Méndez RN as Nurse Coordinator (Cardiology)  DONALD LORENZ

## 2018-03-23 NOTE — PROGRESS NOTES
CARDIOLOGY NEW OFFICE VISIT    HPI: Lul Arriola is a 57 year old male seen today in follow up of cardiomyopathy secondary to cancer treatment.  He has a PMH of Hodgkin's Lymphoma (diagnosed 1993, s/p MOPP chemo and radiation, achieved remission), prior smoking (30 pack years, quit two years ago), anxiety, depression and dilated cardiomyopathy who initially presented to establish care for his cardiomyopathy.    He says he was informed about 20 years ago (after chemotherapy) that he had a weak heart function, however doesn't recall an EF number.     He was doing fine until Feb 2016, when he was in [place] and suddenly started experiencing SOB, palpitations and diaphoresis. He went to a local hospital, was told his EF is about 20%, he didn't have an angiogram (and is not sure if he had any non invasive ischemia eval). He says he was told he shouldnt leave the hospital without a vest that would shock him if he were to go into an abnormal rhythm, however he left AMA.    Currently, he complains of severe dyspnea on minimal exertion, says he is huffing and puffing even while moving around the house. Denies any chest pain, pressure, heaviness or tightness. He does endorse significant abdominal bloating, frequent palpitations, and frequent dizziness that seems to occur when he stands up. He denies early satiety. Notably, whenever he measures his HR its in the 90's (at rest).    He has not had regular coordinated cardiac care in the past. He is visibly frustrated with the healthcare system.    PAST MEDICAL HISTORY:No past medical history on file.    CURRENT MEDICATIONS:  Current Outpatient Prescriptions   Medication Sig Dispense Refill     diazepam (VALIUM) 10 MG tablet Take 1 tablet (10 mg) by mouth 3 times daily 90 tablet 5     carvedilol (COREG) 3.125 MG tablet Take 2 tablets (6.25 mg) by mouth 2 times daily (with meals) 240 tablet 1     ASPIRIN PO Take 81 mg by mouth daily       butalbital-acetaminophen-caffeine  (FIORICET/ESGIC) -40 MG per tablet Take 1 tablet by mouth every 4 hours as needed for pain 15 tablet 5     lisinopril (PRINIVIL/ZESTRIL) 5 MG tablet Take 1 tablet (5 mg) by mouth daily 90 tablet 3     furosemide (LASIX) 20 MG tablet Take 1 tablet (20 mg) by mouth daily 90 tablet 3     acyclovir (ZOVIRAX) 200 MG capsule Take one po tid 270 capsule 3     cyclobenzaprine (FLEXERIL) 10 MG tablet Take 1 tablet (10 mg) by mouth 3 times daily as needed for muscle spasms 90 tablet 5     Aspirin-Acetaminophen-Caffeine (EXCEDRIN PO) Take by mouth as needed       sildenafil (REVATIO/VIAGRA) 100 MG cap/tab Take 0.5-1 tablets ( mg) by mouth daily as needed for erectile dysfunction Take 30 min to 4 hours before intercourse.  Never use with nitroglycerin, terazosin or doxazosin. (Patient not taking: Reported on 12/1/2017) 12 tablet 11     fluticasone (FLONASE) 50 MCG/ACT spray Spray 2 sprays into both nostrils every 12 hours (Patient not taking: Reported on 3/23/2018) 16 g 5     MOTRIN IB PO Take by mouth as needed for moderate pain       PAST SURGICAL HISTORY:  No past surgical history on file.    ALLERGIES  Ultram [tramadol]; Baclofen; Imitrex [sumatriptan]; Ivp dye [contrast dye]; Keflex [cephalexin hcl]; Remeron soltab; Valtrex [valacyclovir]; and Celebrex [celecoxib]    FAMILY HX:  No family history on file.    SOCIAL HX:  Social History     Social History     Marital status: Single     Spouse name: N/A     Number of children: N/A     Years of education: N/A     Social History Main Topics     Smoking status: Former Smoker     Packs/day: 0.50     Years: 30.00     Types: Cigarettes     Smokeless tobacco: Never Used     Alcohol use No     Drug use: No     Sexual activity: Not Asked     Other Topics Concern     None     Social History Narrative       ROS:  Constitutional: No fever, chills, or sweats. No weight gain/loss.   HEENT: No visual disturbance, ear ache, epistaxis, sore throat.   Allergies/Immunologic:  "Negative.   Respiratory: No cough, hemoptysis.   Cardiovascular: As per HPI.   GI: No nausea, vomiting, hematemesis, melena, or hematochezia.   : No urinary frequency, dysuria, or hematuria.   Integument: No rash.   Psychiatric: No anxiety / depression.   Neuro: No speech disturbance, focal sensory or motor deficit.   Endocrinology: No polyuria / polyphagia.   Musculoskeletal: No myalgia.    VITAL SIGNS:  /77 (BP Location: Left arm, Patient Position: Chair, Cuff Size: Adult Regular)  Pulse 87  Ht 1.803 m (5' 11\")  Wt 88 kg (194 lb)  SpO2 98%  BMI 27.06 kg/m2  Body mass index is 27.06 kg/(m^2).  Wt Readings from Last 2 Encounters:   03/23/18 88 kg (194 lb)   01/05/18 88.5 kg (195 lb 1.6 oz)       PHYSICAL EXAM  /77 (BP Location: Left arm, Patient Position: Chair, Cuff Size: Adult Regular)  Pulse 87  Ht 1.803 m (5' 11\")  Wt 88 kg (194 lb)  SpO2 98%  BMI 27.06 kg/m2  GEN: aao x 3, NAD  Neck: JVD ~ 8 cms  LUNGS: No wheezing. +rales at both bases  HEART: S1S2 audible, no murmurs or rubs. Regular rhythm. +tachycardia  ABDOMEN: Soft, nt, +BS  EXTREMITIES: Warm calves, +DPs, no LE edema  NEURO: aao x 3, no focal deficits      LABS  Recent Labs   Lab Test  08/16/17   1419  03/05/16   1237   WBC  6.2  5.8   HGB  13.8  14.4   HCT  42.1  43.8   PLT  204  221     Recent Labs   Lab Test  08/16/17   1419  03/05/16   1237   NA  138  140   POTASSIUM  3.8  3.9   CHLORIDE  107  109   CO2  23  22   GLC  113*  101*   BUN  13  20   CR  0.94  0.83   CLIFFORD  9.1  8.8     Recent Labs   Lab Test  08/16/17   1419  12/05/14   1211  07/30/13   1445   CHOL  216*  213*  190   HDL  46  42  43   LDL  132*  130*  117   TRIG  190*  204*  148   CHOLHDLRATIO   --   5.0  4.4   NHDL  170*   --    --         ECHOCARDIOGRAM: 12/1/17   Severely dilated left ventricle with severely reduced global LV systolic function, LVEF=20-25% (traced 23%.)  Normal right ventricular structure and function.  No significant valvular abnormalities are " noted.  Normal inferior vena cava with normal respiratory variation (estimated RA pressure 3 mmHg.)  No pericardial effusion.  Pulmonary artery systolic pressure cannot be assessed.  Previous study not available for comparison.     Left Ventricle  Left ventricular wall thickness is normal. Severe left ventricular dilation is present. LVIDd 6.4 cm, LVEDVI 135 mL/m2. The Ejection Fraction is estimated at 20-25%. Left ventricular diastolic function is indeterminate. Severe diffuse hypokinesis is present.     Right Ventricle  Global right ventricular function is normal. The right ventricle is normal size.     Atria  Moderate left atrial enlargement is present.     Mitral Valve  Mild mitral insufficiency is present. The cause of the mitral insufficiency appears to be altered left ventricular size and geometry.        Aortic Valve  Mild aortic valve sclerosis is present.     Tricuspid Valve  The tricuspid valve is normal. The peak velocity of the tricuspid regurgitant jet is not obtainable. Pulmonary artery systolic pressure cannot be assessed.     Pulmonic Valve  The pulmonic valve is normal. Trace pulmonic insufficiency is present.     Vessels  The aorta root is normal. The thoracic aorta is normal. The pulmonary artery is normal. The inferior vena cava was normal in size with preserved respiratory variability. Estimated mean right atrial pressure is 3 mmHg.     Pericardium  No pericardial effusion is present.        Compared to Previous Study  Previous study not available for comparison.    IVSd: 0.67 cm  LVIDd: 6.4 cm  LVIDs: 5.6 cm  LVPWd: 0.74 cm  FS: 12.6 %  EDV(Teich): 207.5 ml  ESV(Teich): 152.3 ml  LV mass(C)d: 178.8 grams  LV mass(C)dI: 90.0 grams/m2  Ao root diam: 3.5 cm  asc Aorta Diam: 3.1 cm  LVOT diam: 2.1 cm  LVOT area: 3.3 cm2  EF(MOD-bp): 23.4 %  LA Volume (BP): 86.3 ml  LA Volume Index (BP): 43.4 ml/m2  RWT: 0.23  MV E max marcus: 103.4 cm/sec  MV A max marcus: 102.3 cm/sec  MV E/A: 1.0  MV dec time: 0.13  sec  PA acc time: 0.10 sec  E/E' av.0  Lateral E/e': 16.7  Medial E/e': 9.4    ASSESSMENT AND PLAN:   - 57M, PMH of Hodgkin's lymphoma (diagnosed , s/p MOPP chemo and radiation, achieved remission), prior smoking, anxiety, depression and dilated cardiomyopathy who presents to establish care for his cardiomyopathy.  - Stage C, NYHA Class III  - The etiology of his HF is likely non ischemic (possibly chemotherapy mediated), however there is no ischemia evaluation available at this time.  - He is euvolemic today.   - He reluctantly accepted to try medical treatment. I have been able to get him to take lisinopril and I have been up titrating coreg. He feels well and has no side effects. He is willing to continue the up titration.  - I spoke to him again about AICD. He continues to not be interested.    - Continue Lisinopril 5 mg daily  - Continue Lasix 20 mg daily (diuretic naive): Improved NT pro BNP level.  - Continue coreg uptitration; increase to 6.25 mg PO QAM and 9.125 mg PO QPM for 2 weeks; increase to 9.125 mg PO BID; increase to 9.125 mg PO QAM and 12.5 mg PO QPM; then 12.5 mg PO BID.  - f/u in [facility] and follow up with us in 3 months.     Axel Blanco MD, PhD    CC  Patient Care Team:  Donald Lorenz MD as PCP - General (Family Practice)  Mary Jane Macario APRN CNS as Nurse Practitioner (Clinical Nurse Specialist)  Grzegorz Méndez RN as Nurse Coordinator (Cardiology)  DONALD LORENZ

## 2018-03-23 NOTE — PATIENT INSTRUCTIONS
Continue coreg uptitration;   increase to 6.25 mg  every AM and 9.125 mg every PM for 2 weeks;   increase to 9.125 mg twice a day for 2 weeks;   increase to 9.125 mg every AM and 12.5 mg every PM for 2 weeks;   then 12.5 mg twice a day.      Thank you for your visit today.  Please call me with any questions or concerns.   Grzegorz Méndez RN  Cardiology Care Coordinator  [phone], press option 1 then option 3

## 2018-03-23 NOTE — MR AVS SNAPSHOT
After Visit Summary   3/23/2018    Lul Arriola    MRN: [id number]           Patient Information     Date Of Birth          1960        Visit Information        Provider Department      3/23/2018 11:30 AM Axel Blanco MD [facility]        Today's Diagnoses     Cardiomyopathy, nonischemic (H)    -  1    Secondary cardiomyopathy (H)          Care Instructions    Continue coreg uptitration;   increase to 6.25 mg  every AM and 9.125 mg every PM for 2 weeks;   increase to 9.125 mg twice a day for 2 weeks;   increase to 9.125 mg every AM and 12.5 mg every PM for 2 weeks;   then 12.5 mg twice a day.      Thank you for your visit today.  Please call me with any questions or concerns.   Grzegorz Méndez RN  Cardiology Care Coordinator  [phone], press option 1 then option 3          Follow-ups after your visit        Additional Services     Follow-Up with Cardiologist           Follow-Up with Cardiologist                 Your next 10 appointments already scheduled     Valeriy 15, 2018 12:30 PM CDT   (Arrive by 12:15 PM)   RETURN HEART FAILURE with Axel Blanco MD   [facility] ([facility])    [address] [address] [phone]            Sep 07, 2018  1:15 PM CDT   Adult Med Follow UP with BRENT Payne CNS   [facility] ([facility])    [address]  [address] [id number] [phone]              Future tests that were ordered for you today     Open Future Orders        Priority Expected Expires Ordered    Follow-Up with Cardiologist Routine 6/21/2018 9/19/2018 3/23/2018    Follow-Up with Cardiologist Routine 6/21/2018 9/19/2018 3/23/2018            Who to contact     If you have questions or need follow up information about today's clinic visit or your schedule please contact [facility] directly at  "[phone].  Normal or non-critical lab and imaging results will be communicated to you by [employer], letter or phone within 4 business days after the clinic has received the results. If you do not hear from us within 7 days, please contact the clinic through [employer] or phone. If you have a critical or abnormal lab result, we will notify you by phone as soon as possible.  Submit refill requests through [facility] or call your pharmacy and they will forward the refill request to us. Please allow 3 business days for your refill to be completed.          Additional Information About Your Visit        [employer] [facility] Information     [facility] lets you send messages to your doctor, view your test results, renew your prescriptions, schedule appointments and more. To sign up, go to [email or web address] . Click on \"Log in\" on the left side of the screen, which will take you to the Welcome page. Then click on \"Sign up Now\" on the right side of the page.     You will be asked to enter the access code listed below, as well as some personal information. Please follow the directions to create your username and password.     Your access code is: [email or web address]  Expires: 2018  7:30 AM     Your access code will  in 90 days. If you need help or a new code, please call your [facility] clinic or [phone].        Care EveryWhere ID     This is your Care EveryWhere ID. This could be used by other organizations to access your [facility] medical records  [id number]        Your Vitals Were     Pulse Height Pulse Oximetry BMI (Body Mass Index)          87 1.803 m (5' 11\") 98% 27.06 kg/m2         Blood Pressure from Last 3 Encounters:   18 117/77   18 127/79   17 135/84    Weight from Last 3 Encounters:   18 88 kg (194 lb)   18 88.5 kg (195 lb 1.6 oz)   17 87.1 kg (192 lb)                 Today's Medication Changes          These changes are accurate as of 3/23/18 12:43 PM.  If you have any questions, ask " your nurse or doctor.               These medicines have changed or have updated prescriptions.        Dose/Directions    * carvedilol 3.125 MG tablet   Commonly known as:  COREG   This may have changed:  how much to take   Used for:  Secondary cardiomyopathy (H)   Changed by:  Axel Blanco MD        Dose:  9.375 mg   Take 3 tablets (9.375 mg) by mouth 2 times daily (with meals)   Quantity:  180 tablet   Refills:  1       * carvedilol 12.5 MG tablet   Commonly known as:  COREG   This may have changed:  You were already taking a medication with the same name, and this prescription was added. Make sure you understand how and when to take each.   Used for:  Cardiomyopathy, nonischemic (H), Secondary cardiomyopathy (H)   Changed by:  Axel Blanco MD        Dose:  12.5 mg   Take 1 tablet (12.5 mg) by mouth 2 times daily (with meals)   Quantity:  180 tablet   Refills:  3       * Notice:  This list has 2 medication(s) that are the same as other medications prescribed for you. Read the directions carefully, and ask your doctor or other care provider to review them with you.         Where to get your medicines      Some of these will need a paper prescription and others can be bought over the counter.  Ask your nurse if you have questions.     Bring a paper prescription for each of these medications     carvedilol 12.5 MG tablet    carvedilol 3.125 MG tablet                Primary Care Provider Office Phone # Fax #    Donald Lorenz [phone]       [address] [address]        Equal Access to Services     [address] AH: Hadii lars Rios, waaxda luqadaha, qaybta kaalmada karine raman. So [facility] [phone].    ATENCIÓN: Si habla español, tiene a daniel disposición servicios gratuitos de asistencia lingüística. Llame al [phone].    We comply with applicable federal civil rights laws and [place] laws. We do  not discriminate on the basis of race, color, national origin, age, disability, sex, sexual orientation, or gender identity.            Thank you!     Thank you for choosing [facility]  for your care. Our goal is always to provide you with excellent care. Hearing back from our patients is one way we can continue to improve our services. Please take a few minutes to complete the written survey that you may receive in the mail after your visit with us. Thank you!             Your Updated Medication List - Protect others around you: Learn how to safely use, store and throw away your medicines at www.disposemymeds.org.          This list is accurate as of 3/23/18 12:43 PM.  Always use your most recent med list.                   Brand Name Dispense Instructions for use Diagnosis    acyclovir 200 MG capsule    ZOVIRAX    270 capsule    Take one po tid    HSV (herpes simplex virus) infection       ASPIRIN PO      Take 81 mg by mouth daily        butalbital-acetaminophen-caffeine -40 MG per tablet    FIORICET/ESGIC    15 tablet    Take 1 tablet by mouth every 4 hours as needed for pain    Neck pain, Tension headache       * carvedilol 3.125 MG tablet    COREG    180 tablet    Take 3 tablets (9.375 mg) by mouth 2 times daily (with meals)    Secondary cardiomyopathy (H)       * carvedilol 12.5 MG tablet    COREG    180 tablet    Take 1 tablet (12.5 mg) by mouth 2 times daily (with meals)    Cardiomyopathy, nonischemic (H), Secondary cardiomyopathy (H)       cyclobenzaprine 10 MG tablet    FLEXERIL    90 tablet    Take 1 tablet (10 mg) by mouth 3 times daily as needed for muscle spasms    Neck pain       diazepam 10 MG tablet    VALIUM    90 tablet    Take 1 tablet (10 mg) by mouth 3 times daily    Anxiety state       EXCEDRIN PO      Take by mouth as needed        fluticasone 50 MCG/ACT spray    FLONASE    16 g    Spray 2 sprays into both nostrils every 12 hours    Chronic allergic rhinitis, unspecified  seasonality, unspecified trigger       furosemide 20 MG tablet    LASIX    90 tablet    Take 1 tablet (20 mg) by mouth daily    Systolic congestive heart failure, unspecified congestive heart failure chronicity (H), Benign essential hypertension       lisinopril 5 MG tablet    PRINIVIL/ZESTRIL    90 tablet    Take 1 tablet (5 mg) by mouth daily    Systolic congestive heart failure, unspecified congestive heart failure chronicity (H), Benign essential hypertension       MOTRIN IB PO      Take by mouth as needed for moderate pain        sildenafil 100 MG tablet    VIAGRA    12 tablet    Take 0.5-1 tablets ( mg) by mouth daily as needed for erectile dysfunction Take 30 min to 4 hours before intercourse.  Never use with nitroglycerin, terazosin or doxazosin.    Other male erectile dysfunction       * Notice:  This list has 2 medication(s) that are the same as other medications prescribed for you. Read the directions carefully, and ask your doctor or other care provider to review them with you.

## 2018-04-03 ENCOUNTER — CARE COORDINATION (OUTPATIENT)
Dept: CARDIOLOGY | Facility: CLINIC | Age: 58
End: 2018-04-03

## 2018-04-03 NOTE — PROGRESS NOTES
Pt has colonoscopy scheduled for next week. He was advised to contact cardiology for recommendations and medications prior to procedure.  Explained to patient that typically GI requests recommendations regarding anticoagulants prior to colonoscopy.  He is not on anticoagulation, only on baby aspirin which he states he does not take daily.  Told patient he likely does not have to hold or cut back on any cardiology medications prior to colonoscopy, but will have Dr. Blanco's RN review.  RN to contact patient only if patient needs to hold any medication beforehand.    Patient verbalized understanding and is in agreement to the plan.

## 2018-05-09 DIAGNOSIS — G44.209 TENSION HEADACHE: ICD-10-CM

## 2018-05-09 DIAGNOSIS — M54.2 NECK PAIN: ICD-10-CM

## 2018-05-09 RX ORDER — BUTALBITAL, ACETAMINOPHEN AND CAFFEINE 50; 325; 40 MG/1; MG/1; MG/1
1 TABLET ORAL EVERY 4 HOURS PRN
Qty: 15 TABLET | Refills: 5
Start: 2018-05-09 | End: 2018-11-28

## 2018-05-09 NOTE — TELEPHONE ENCOUNTER
foricet   Last Written Prescription Date:  12/1/17  Last Fill Quantity: 15,   # refills: 5  Last Office Visit : 12/1/17  Future Office visit:  No future appt    Routing refill request to nurse for review/approval because:  Drug not on the [facility], [facility] or [facility] refill protocol or controlled substance

## 2018-06-15 ENCOUNTER — OFFICE VISIT (OUTPATIENT)
Dept: CARDIOLOGY | Facility: CLINIC | Age: 58
End: 2018-06-15
Attending: INTERNAL MEDICINE
Payer: MEDICARE

## 2018-06-15 VITALS
HEART RATE: 72 BPM | SYSTOLIC BLOOD PRESSURE: 118 MMHG | OXYGEN SATURATION: 98 % | DIASTOLIC BLOOD PRESSURE: 74 MMHG | BODY MASS INDEX: 27.13 KG/M2 | HEIGHT: 71 IN | WEIGHT: 193.8 LBS

## 2018-06-15 DIAGNOSIS — I10 BENIGN ESSENTIAL HYPERTENSION: ICD-10-CM

## 2018-06-15 DIAGNOSIS — I50.20 SYSTOLIC CONGESTIVE HEART FAILURE, UNSPECIFIED CONGESTIVE HEART FAILURE CHRONICITY: ICD-10-CM

## 2018-06-15 DIAGNOSIS — I42.8 CARDIOMYOPATHY, NONISCHEMIC (H): ICD-10-CM

## 2018-06-15 PROCEDURE — G0463 HOSPITAL OUTPT CLINIC VISIT: HCPCS | Mod: ZF

## 2018-06-15 PROCEDURE — 99214 OFFICE O/P EST MOD 30 MIN: CPT | Mod: ZP | Performed by: INTERNAL MEDICINE

## 2018-06-15 RX ORDER — POTASSIUM CHLORIDE 750 MG/1
10 TABLET, EXTENDED RELEASE ORAL 2 TIMES DAILY
Qty: 90 TABLET | Refills: 3 | Status: SHIPPED | OUTPATIENT
Start: 2018-06-15 | End: 2019-11-04

## 2018-06-15 RX ORDER — LISINOPRIL 5 MG/1
5 TABLET ORAL 2 TIMES DAILY
Qty: 180 TABLET | Refills: 3 | Status: SHIPPED | OUTPATIENT
Start: 2018-06-15 | End: 2019-06-12

## 2018-06-15 ASSESSMENT — PAIN SCALES - GENERAL: PAINLEVEL: NO PAIN (0)

## 2018-06-15 NOTE — NURSING NOTE
Chief Complaint   Patient presents with     Follow Up For     3 month follow      Vitals were taken and medications were reconciled.    Stephany Yarbrough RMA  12:18 PM

## 2018-06-15 NOTE — PATIENT INSTRUCTIONS
Please increase your Lisinopril to 5 MG every morning and 2.5 MG every evening. Do this for 2 weeks and then increase the lisinopril to 5 MG twice daily.    Please start taking Potasium Chloride 10 mEq once daily.    Thank you for your visit today.  Please call me with any questions or concerns.   Grzegorz Méndez RN  Cardiology Care Coordinator  [phone], press option 1 then option 3

## 2018-06-15 NOTE — MR AVS SNAPSHOT
After Visit Summary   6/15/2018    Lul Arriola    MRN: [id number]           Patient Information     Date Of Birth          1960        Visit Information        Provider Department      6/15/2018 12:30 PM Axel Blanco MD [facility]        Today's Diagnoses     Cardiomyopathy, nonischemic (H)        Systolic congestive heart failure, unspecified congestive heart failure chronicity (H)        Benign essential hypertension          Care Instructions    Please increase your Lisinopril to 5 MG every morning and 2.5 MG every evening. Do this for 2 weeks and then increase the lisinopril to 5 MG twice daily.    Please start taking Potasium Chloride 10 mEq once daily.    Thank you for your visit today.  Please call me with any questions or concerns.   Grzegorz Méndez RN  Cardiology Care Coordinator  [phone], press option 1 then option 3          Follow-ups after your visit        Additional Services     Follow-Up with Cardiologist                 Your next 10 appointments already scheduled     Sep 07, 2018  1:15 PM CDT   Adult Med Follow UP with BRENT Payne CNS   [facility] ([facility])    [place] [id number]  [address] [place] [id number] [phone]            Dec 12, 2018 12:30 PM CST   (Arrive by 12:15 PM)   Return Visit with Dalton Reynolds MD   [facility] ([facility])    [address] [place] [address]  [address] [place] [id number] [phone]              Future tests that were ordered for you today     Open Future Orders        Priority Expected Expires Ordered    Follow-Up with Cardiologist Routine 12/12/2018 3/12/2019 6/15/2018            Who to contact     If you have questions or need follow up information about today's clinic visit or your schedule please contact [facility] directly at [phone].  Normal or non-critical lab and imaging results will  "be communicated to you by MyChart, letter or phone within 4 business days after the clinic has received the results. If you do not hear from us within 7 days, please contact the clinic through MyChart or phone. If you have a critical or abnormal lab result, we will notify you by phone as soon as possible.  Submit refill requests through [email or web address] or call your pharmacy and they will forward the refill request to us. Please allow 3 business days for your refill to be completed.          Additional Information About Your Visit        Care EveryWhere ID     This is your Care EveryWhere ID. This could be used by other organizations to access your [facility] medical records  [id number]        Your Vitals Were     Pulse Height Pulse Oximetry BMI (Body Mass Index)          72 1.803 m (5' 11\") 98% 27.03 kg/m2         Blood Pressure from Last 3 Encounters:   06/15/18 118/74   03/23/18 117/77   01/05/18 127/79    Weight from Last 3 Encounters:   06/15/18 87.9 kg (193 lb 12.8 oz)   03/23/18 88 kg (194 lb)   01/05/18 88.5 kg (195 lb 1.6 oz)              We Performed the Following     Follow-Up with Cardiologist          Today's Medication Changes          These changes are accurate as of 6/15/18  1:07 PM.  If you have any questions, ask your nurse or doctor.               Start taking these medicines.        Dose/Directions    potassium chloride 10 MEQ tablet   Commonly known as:  K-TAB,KLOR-CON   Used for:  Cardiomyopathy, nonischemic (H)   Started by:  Axel Blanco MD        Dose:  10 mEq   Take 1 tablet (10 mEq) by mouth 2 times daily   Quantity:  90 tablet   Refills:  3         These medicines have changed or have updated prescriptions.        Dose/Directions    * carvedilol 3.125 MG tablet   Commonly known as:  COREG   This may have changed:  how much to take   Used for:  Secondary cardiomyopathy (H)        Dose:  9.375 mg   Take 3 tablets (9.375 mg) by mouth 2 times daily (with meals)   Quantity:  180 tablet "   Refills:  1       * carvedilol 12.5 MG tablet   Commonly known as:  COREG   This may have changed:  Another medication with the same name was changed. Make sure you understand how and when to take each.   Used for:  Cardiomyopathy, nonischemic (H), Secondary cardiomyopathy (H)        Dose:  12.5 mg   Take 1 tablet (12.5 mg) by mouth 2 times daily (with meals)   Quantity:  180 tablet   Refills:  3       lisinopril 5 MG tablet   Commonly known as:  PRINIVIL/ZESTRIL   This may have changed:  when to take this   Used for:  Systolic congestive heart failure, unspecified congestive heart failure chronicity (H), Benign essential hypertension   Changed by:  Axel Blanco MD        Dose:  5 mg   Take 1 tablet (5 mg) by mouth 2 times daily   Quantity:  180 tablet   Refills:  3       * Notice:  This list has 2 medication(s) that are the same as other medications prescribed for you. Read the directions carefully, and ask your doctor or other care provider to review them with you.         Where to get your medicines      These medications were sent to [facility] - [place] [address]     Phone:  [phone]     lisinopril 5 MG tablet    potassium chloride 10 MEQ tablet                Primary Care Provider Office Phone # Fax #    Donald Lorenz [phone]       [address] [address]        Equal Access to Services     [address] AH: Hadii lars sanono Soameena, waaxda luqadaha, qaybta kaalmada adeegyada, karine bains. So [facility] [phone].    ATENCIÓN: Si habla español, tiene a daniel disposición servicios gratuitos de asistencia lingüística. Llame al [phone].    We comply with applicable federal civil rights laws and [place] laws. We do not discriminate on the basis of race, color, national origin, age, disability, sex, sexual orientation, or gender  identity.            Thank you!     Thank you for choosing [facility]  for your care. Our goal is always to provide you with excellent care. Hearing back from our patients is one way we can continue to improve our services. Please take a few minutes to complete the written survey that you may receive in the mail after your visit with us. Thank you!             Your Updated Medication List - Protect others around you: Learn how to safely use, store and throw away your medicines at www.disposemymeds.org.          This list is accurate as of 6/15/18  1:07 PM.  Always use your most recent med list.                   Brand Name Dispense Instructions for use Diagnosis    acyclovir 200 MG capsule    ZOVIRAX    270 capsule    Take one po tid    HSV (herpes simplex virus) infection       ASPIRIN PO      Take 81 mg by mouth daily        butalbital-acetaminophen-caffeine -40 MG per tablet    FIORICET/ESGIC    15 tablet    Take 1 tablet by mouth every 4 hours as needed for pain    Neck pain, Tension headache       * carvedilol 3.125 MG tablet    COREG    180 tablet    Take 3 tablets (9.375 mg) by mouth 2 times daily (with meals)    Secondary cardiomyopathy (H)       * carvedilol 12.5 MG tablet    COREG    180 tablet    Take 1 tablet (12.5 mg) by mouth 2 times daily (with meals)    Cardiomyopathy, nonischemic (H), Secondary cardiomyopathy (H)       cyclobenzaprine 10 MG tablet    FLEXERIL    90 tablet    Take 1 tablet (10 mg) by mouth 3 times daily as needed for muscle spasms    Neck pain       diazepam 10 MG tablet    VALIUM    90 tablet    Take 1 tablet (10 mg) by mouth 3 times daily    Anxiety state       EXCEDRIN PO      Take by mouth as needed        fluticasone 50 MCG/ACT spray    FLONASE    16 g    Spray 2 sprays into both nostrils every 12 hours    Chronic allergic rhinitis, unspecified seasonality, unspecified trigger       furosemide 20 MG tablet    LASIX    90 tablet    Take 1 tablet (20 mg) by mouth  daily    Systolic congestive heart failure, unspecified congestive heart failure chronicity (H), Benign essential hypertension       lisinopril 5 MG tablet    PRINIVIL/ZESTRIL    180 tablet    Take 1 tablet (5 mg) by mouth 2 times daily    Systolic congestive heart failure, unspecified congestive heart failure chronicity (H), Benign essential hypertension       MOTRIN IB PO      Take by mouth as needed for moderate pain        potassium chloride 10 MEQ tablet    K-TAB,KLOR-CON    90 tablet    Take 1 tablet (10 mEq) by mouth 2 times daily    Cardiomyopathy, nonischemic (H)       sildenafil 100 MG tablet    VIAGRA    12 tablet    Take 0.5-1 tablets ( mg) by mouth daily as needed for erectile dysfunction Take 30 min to 4 hours before intercourse.  Never use with nitroglycerin, terazosin or doxazosin.    Other male erectile dysfunction       * Notice:  This list has 2 medication(s) that are the same as other medications prescribed for you. Read the directions carefully, and ask your doctor or other care provider to review them with you.

## 2018-06-15 NOTE — LETTER
6/15/2018      RE: Lul Arriola  [address] [facility] [id number]       Dear Colleague,    Thank you for the opportunity to participate in the care of your patient, Lul Arriola, at the [facility]. Please see a copy of my visit note below.    CARDIOLOGY NEW OFFICE VISIT    HPI: Lul Arriola is a 57 year old male seen today in follow up of cardiomyopathy secondary to cancer treatment.  He has a PMH of Hodgkin's Lymphoma (diagnosed 1993, s/p MOPP chemo and radiation, achieved remission), prior smoking (30 pack years, quit two years ago), anxiety, depression and dilated cardiomyopathy who initially presented to establish care for his cardiomyopathy.    He says he was informed about 20 years ago (after chemotherapy) that he had a weak heart function, however doesn't recall an EF number.     He was doing fine until Feb 2016, when he was in [place] and suddenly started experiencing SOB, palpitations and diaphoresis. He went to a local hospital, was told his EF is about 20%, he didn't have an angiogram (and is not sure if he had any non invasive ischemia eval). He says he was told he shouldnt leave the hospital without a vest that would shock him if he were to go into an abnormal rhythm, however he left AMA.    Currently, he complains of severe dyspnea on minimal exertion, says he is huffing and puffing even while moving around the house. Denies any chest pain, pressure, heaviness or tightness. He does endorse significant abdominal bloating, frequent palpitations, and frequent dizziness that seems to occur when he stands up. He denies early satiety. Notably, whenever he measures his HR its in the 90's (at rest).    He has not had regular coordinated cardiac care in the past. He is visibly frustrated with the healthcare system.    PAST MEDICAL HISTORY:  Past Medical History:   Diagnosis Date     Cardiomyopathy, nonischemic (H)  3/23/2018       CURRENT MEDICATIONS:  Current Outpatient Prescriptions   Medication Sig Dispense Refill     Aspirin-Acetaminophen-Caffeine (EXCEDRIN PO) Take by mouth as needed       butalbital-acetaminophen-caffeine (FIORICET/ESGIC) -40 MG per tablet Take 1 tablet by mouth every 4 hours as needed for pain 15 tablet 5     carvedilol (COREG) 12.5 MG tablet Take 1 tablet (12.5 mg) by mouth 2 times daily (with meals) 180 tablet 3     carvedilol (COREG) 3.125 MG tablet Take 3 tablets (9.375 mg) by mouth 2 times daily (with meals) (Patient taking differently: Take 12.5 mg by mouth 2 times daily (with meals) ) 180 tablet 1     cyclobenzaprine (FLEXERIL) 10 MG tablet Take 1 tablet (10 mg) by mouth 3 times daily as needed for muscle spasms 90 tablet 5     diazepam (VALIUM) 10 MG tablet Take 1 tablet (10 mg) by mouth 3 times daily 90 tablet 5     furosemide (LASIX) 20 MG tablet Take 1 tablet (20 mg) by mouth daily 90 tablet 3     lisinopril (PRINIVIL/ZESTRIL) 5 MG tablet Take 1 tablet (5 mg) by mouth daily 90 tablet 3     acyclovir (ZOVIRAX) 200 MG capsule Take one po tid (Patient not taking: Reported on 6/15/2018) 270 capsule 3     ASPIRIN PO Take 81 mg by mouth daily       fluticasone (FLONASE) 50 MCG/ACT spray Spray 2 sprays into both nostrils every 12 hours (Patient not taking: Reported on 3/23/2018) 16 g 5     MOTRIN IB PO Take by mouth as needed for moderate pain       sildenafil (REVATIO/VIAGRA) 100 MG cap/tab Take 0.5-1 tablets ( mg) by mouth daily as needed for erectile dysfunction Take 30 min to 4 hours before intercourse.  Never use with nitroglycerin, terazosin or doxazosin. (Patient not taking: Reported on 12/1/2017) 12 tablet 11     PAST SURGICAL HISTORY:  No past surgical history on file.    ALLERGIES  Ultram [tramadol]; Baclofen; Imitrex [sumatriptan]; Ivp dye [contrast dye]; Keflex [cephalexin hcl]; Remeron soltab; Valtrex [valacyclovir]; and Celebrex [celecoxib]    FAMILY HX:  No family history  "on file.    SOCIAL HX:  Social History     Social History     Marital status: Single     Spouse name: N/A     Number of children: N/A     Years of education: N/A     Social History Main Topics     Smoking status: Former Smoker     Packs/day: 0.50     Years: 30.00     Types: Cigarettes     Smokeless tobacco: Never Used     Alcohol use No     Drug use: No     Sexual activity: Not Asked     Other Topics Concern     None     Social History Narrative     ROS:  Constitutional: No fever, chills, or sweats. No weight gain/loss.   HEENT: No visual disturbance, ear ache, epistaxis, sore throat.   Allergies/Immunologic: Negative.   Respiratory: No cough, hemoptysis.   Cardiovascular: As per HPI.   GI: No nausea, vomiting, hematemesis, melena, or hematochezia.   : No urinary frequency, dysuria, or hematuria.   Integument: No rash.   Psychiatric: No anxiety / depression.   Neuro: No speech disturbance, focal sensory or motor deficit.   Endocrinology: No polyuria / polyphagia.   Musculoskeletal: No myalgia.    VITAL SIGNS:  /74 (BP Location: Left arm, Patient Position: Chair, Cuff Size: Adult Regular)  Pulse 72  Ht 1.803 m (5' 11\")  Wt 87.9 kg (193 lb 12.8 oz)  SpO2 98%  BMI 27.03 kg/m2  Body mass index is 27.03 kg/(m^2).  Wt Readings from Last 2 Encounters:   06/15/18 87.9 kg (193 lb 12.8 oz)   03/23/18 88 kg (194 lb)     PHYSICAL EXAM  /74 (BP Location: Left arm, Patient Position: Chair, Cuff Size: Adult Regular)  Pulse 72  Ht 1.803 m (5' 11\")  Wt 87.9 kg (193 lb 12.8 oz)  SpO2 98%  BMI 27.03 kg/m2  GEN: aao x 3, NAD  Neck: JVD ~ 8 cms  LUNGS: No wheezing. +rales at both bases  HEART: S1S2 audible, no murmurs or rubs. Regular rhythm. +tachycardia  ABDOMEN: Soft, nt, +BS  EXTREMITIES: Warm calves, +DPs, no LE edema  NEURO: aao x 3, no focal deficits      LABS  Recent Labs   Lab Test  08/16/17   1419  03/05/16   1237   WBC  6.2  5.8   HGB  13.8  14.4   HCT  42.1  43.8   PLT  204  221     Recent Labs   Lab " Test  08/16/17   1419  03/05/16   1237   NA  138  140   POTASSIUM  3.8  3.9   CHLORIDE  107  109   CO2  23  22   GLC  113*  101*   BUN  13  20   CR  0.94  0.83   CLIFFORD  9.1  8.8     Recent Labs   Lab Test  08/16/17   1419  12/05/14   1211  07/30/13   1445   CHOL  216*  213*  190   HDL  46  42  43   LDL  132*  130*  117   TRIG  190*  204*  148   CHOLHDLRATIO   --   5.0  4.4   NHDL  170*   --    --       ECHOCARDIOGRAM: 12/1/17   Severely dilated left ventricle with severely reduced global LV systolic function, LVEF=20-25% (traced 23%.)  Normal right ventricular structure and function.  No significant valvular abnormalities are noted.  Normal inferior vena cava with normal respiratory variation (estimated RA pressure 3 mmHg.)  No pericardial effusion.  Pulmonary artery systolic pressure cannot be assessed.  Previous study not available for comparison.     Left Ventricle  Left ventricular wall thickness is normal. Severe left ventricular dilation is present. LVIDd 6.4 cm, LVEDVI 135 mL/m2. The Ejection Fraction is estimated at 20-25%. Left ventricular diastolic function is indeterminate. Severe diffuse hypokinesis is present.     Right Ventricle  Global right ventricular function is normal. The right ventricle is normal size.     Atria  Moderate left atrial enlargement is present.     Mitral Valve  Mild mitral insufficiency is present. The cause of the mitral insufficiency appears to be altered left ventricular size and geometry.        Aortic Valve  Mild aortic valve sclerosis is present.     Tricuspid Valve  The tricuspid valve is normal. The peak velocity of the tricuspid regurgitant jet is not obtainable. Pulmonary artery systolic pressure cannot be assessed.     Pulmonic Valve  The pulmonic valve is normal. Trace pulmonic insufficiency is present.     Vessels  The aorta root is normal. The thoracic aorta is normal. The pulmonary artery is normal. The inferior vena cava was normal in size with preserved respiratory  variability. Estimated mean right atrial pressure is 3 mmHg.     Pericardium  No pericardial effusion is present.        Compared to Previous Study  Previous study not available for comparison.    IVSd: 0.67 cm  LVIDd: 6.4 cm  LVIDs: 5.6 cm  LVPWd: 0.74 cm  FS: 12.6 %  EDV(Teich): 207.5 ml  ESV(Teich): 152.3 ml  LV mass(C)d: 178.8 grams  LV mass(C)dI: 90.0 grams/m2  Ao root diam: 3.5 cm  asc Aorta Diam: 3.1 cm  LVOT diam: 2.1 cm  LVOT area: 3.3 cm2  EF(MOD-bp): 23.4 %  LA Volume (BP): 86.3 ml  LA Volume Index (BP): 43.4 ml/m2  RWT: 0.23  MV E max marcus: 103.4 cm/sec  MV A max marcus: 102.3 cm/sec  MV E/A: 1.0  MV dec time: 0.13 sec  PA acc time: 0.10 sec  E/E' av.0  Lateral E/e': 16.7  Medial E/e': 9.4    ASSESSMENT AND PLAN:   - 57M, PMH of Hodgkin's lymphoma (diagnosed , s/p MOPP chemo and radiation, achieved remission), prior smoking, anxiety, depression and dilated cardiomyopathy who presents to establish care for his cardiomyopathy.  - Stage C, NYHA Class III  - The etiology of his HF is likely non ischemic (possibly chemotherapy mediated), however there is no ischemia evaluation available at this time. He has not been willing to undergo additional evaluation.  - I spoke to him again about AICD. He continues to not be interested.  - He is euvolemic today.   - He reluctantly accepted to try medical treatment. I have been able to get him to take lisinopril and I have been up titrating coreg. He feels well and has no side effects. He is willing to continue the up titration. At this visit, I will continue uptitration of lisinopril.    - Increase lisinopril to 5 mg QAM and 2.5 mg PO QHS for 2 weeks; then increase to 5 mg PO BID.  - Continue Lasix 20 mg daily (diuretic naive): Improved NT pro BNP level.  - Continue coreg 12.5 mg PO BID.  - f/u in [facility] clinic and follow up with us in 6 months.     Axel Blanco MD, PhD    CC  Patient Care Team:  Donald Lorenz MD as PCP - General (Family Practice)  [facility]  BRENT Byrd CNS as Nurse Practitioner (Clinical Nurse Specialist)  Grzegorz Méndez RN as Nurse Coordinator (Cardiology)    Please do not hesitate to contact me if you have any questions/concerns.   Sincerely,   Axel Blanco MD

## 2018-06-15 NOTE — NURSING NOTE
Med Reconcile: Reviewed and verified all current medications with the patient. The updated medication list was printed and given to the patient.  New Medication: KCL 10 mEq daily.Patient was educated regarding newly prescribed medication, including discussion of  the indication, administration, side effects, and when to report to MD or RN. Patient demonstrated understanding of this information and agreed to call with further questions or concerns.  Return Appointment: Follow up in 6 months. Patient given instructions regarding scheduling next clinic visit. Patient demonstrated understanding of this information and agreed to call with further questions or concerns.  Medication Change: Increase Lisinopril to 5 MG every morning and 2.5 MG every evening for 2 weeks then increase to 5 MG twice daily there after. Patient was educated regarding prescribed medication change, including discussion of the indication, administration, side effects, and when to report to MD or RN. Patient demonstrated understanding of this information and agreed to call with further questions or concerns.  Patient stated he understood all health information given and agreed to call with further questions or concerns.

## 2018-06-15 NOTE — PROGRESS NOTES
CARDIOLOGY NEW OFFICE VISIT    HPI: Lul Arriola is a 57 year old male seen today in follow up of cardiomyopathy secondary to cancer treatment.  He has a PMH of Hodgkin's Lymphoma (diagnosed 1993, s/p MOPP chemo and radiation, achieved remission), prior smoking (30 pack years, quit two years ago), anxiety, depression and dilated cardiomyopathy who initially presented to establish care for his cardiomyopathy.    He says he was informed about 20 years ago (after chemotherapy) that he had a weak heart function, however doesn't recall an EF number.     He was doing fine until Feb 2016, when he was in [place] and suddenly started experiencing SOB, palpitations and diaphoresis. He went to a local hospital, was told his EF is about 20%, he didn't have an angiogram (and is not sure if he had any non invasive ischemia eval). He says he was told he shouldnt leave the hospital without a vest that would shock him if he were to go into an abnormal rhythm, however he left AMA.    Currently, he complains of severe dyspnea on minimal exertion, says he is huffing and puffing even while moving around the house. Denies any chest pain, pressure, heaviness or tightness. He does endorse significant abdominal bloating, frequent palpitations, and frequent dizziness that seems to occur when he stands up. He denies early satiety. Notably, whenever he measures his HR its in the 90's (at rest).    He has not had regular coordinated cardiac care in the past. He is visibly frustrated with the healthcare system.    PAST MEDICAL HISTORY:  Past Medical History:   Diagnosis Date     Cardiomyopathy, nonischemic (H) 3/23/2018       CURRENT MEDICATIONS:  Current Outpatient Prescriptions   Medication Sig Dispense Refill     Aspirin-Acetaminophen-Caffeine (EXCEDRIN PO) Take by mouth as needed       butalbital-acetaminophen-caffeine (FIORICET/ESGIC) -40 MG per tablet Take 1 tablet by mouth every 4 hours as needed for pain 15 tablet 5      carvedilol (COREG) 12.5 MG tablet Take 1 tablet (12.5 mg) by mouth 2 times daily (with meals) 180 tablet 3     carvedilol (COREG) 3.125 MG tablet Take 3 tablets (9.375 mg) by mouth 2 times daily (with meals) (Patient taking differently: Take 12.5 mg by mouth 2 times daily (with meals) ) 180 tablet 1     cyclobenzaprine (FLEXERIL) 10 MG tablet Take 1 tablet (10 mg) by mouth 3 times daily as needed for muscle spasms 90 tablet 5     diazepam (VALIUM) 10 MG tablet Take 1 tablet (10 mg) by mouth 3 times daily 90 tablet 5     furosemide (LASIX) 20 MG tablet Take 1 tablet (20 mg) by mouth daily 90 tablet 3     lisinopril (PRINIVIL/ZESTRIL) 5 MG tablet Take 1 tablet (5 mg) by mouth daily 90 tablet 3     acyclovir (ZOVIRAX) 200 MG capsule Take one po tid (Patient not taking: Reported on 6/15/2018) 270 capsule 3     ASPIRIN PO Take 81 mg by mouth daily       fluticasone (FLONASE) 50 MCG/ACT spray Spray 2 sprays into both nostrils every 12 hours (Patient not taking: Reported on 3/23/2018) 16 g 5     MOTRIN IB PO Take by mouth as needed for moderate pain       sildenafil (REVATIO/VIAGRA) 100 MG cap/tab Take 0.5-1 tablets ( mg) by mouth daily as needed for erectile dysfunction Take 30 min to 4 hours before intercourse.  Never use with nitroglycerin, terazosin or doxazosin. (Patient not taking: Reported on 12/1/2017) 12 tablet 11     PAST SURGICAL HISTORY:  No past surgical history on file.    ALLERGIES  Ultram [tramadol]; Baclofen; Imitrex [sumatriptan]; Ivp dye [contrast dye]; Keflex [cephalexin hcl]; Remeron soltab; Valtrex [valacyclovir]; and Celebrex [celecoxib]    FAMILY HX:  No family history on file.    SOCIAL HX:  Social History     Social History     Marital status: Single     Spouse name: N/A     Number of children: N/A     Years of education: N/A     Social History Main Topics     Smoking status: Former Smoker     Packs/day: 0.50     Years: 30.00     Types: Cigarettes     Smokeless tobacco: Never Used      "Alcohol use No     Drug use: No     Sexual activity: Not Asked     Other Topics Concern     None     Social History Narrative     ROS:  Constitutional: No fever, chills, or sweats. No weight gain/loss.   HEENT: No visual disturbance, ear ache, epistaxis, sore throat.   Allergies/Immunologic: Negative.   Respiratory: No cough, hemoptysis.   Cardiovascular: As per HPI.   GI: No nausea, vomiting, hematemesis, melena, or hematochezia.   : No urinary frequency, dysuria, or hematuria.   Integument: No rash.   Psychiatric: No anxiety / depression.   Neuro: No speech disturbance, focal sensory or motor deficit.   Endocrinology: No polyuria / polyphagia.   Musculoskeletal: No myalgia.    VITAL SIGNS:  /74 (BP Location: Left arm, Patient Position: Chair, Cuff Size: Adult Regular)  Pulse 72  Ht 1.803 m (5' 11\")  Wt 87.9 kg (193 lb 12.8 oz)  SpO2 98%  BMI 27.03 kg/m2  Body mass index is 27.03 kg/(m^2).  Wt Readings from Last 2 Encounters:   06/15/18 87.9 kg (193 lb 12.8 oz)   03/23/18 88 kg (194 lb)     PHYSICAL EXAM  /74 (BP Location: Left arm, Patient Position: Chair, Cuff Size: Adult Regular)  Pulse 72  Ht 1.803 m (5' 11\")  Wt 87.9 kg (193 lb 12.8 oz)  SpO2 98%  BMI 27.03 kg/m2  GEN: aao x 3, NAD  Neck: JVD ~ 8 cms  LUNGS: No wheezing. +rales at both bases  HEART: S1S2 audible, no murmurs or rubs. Regular rhythm. +tachycardia  ABDOMEN: Soft, nt, +BS  EXTREMITIES: Warm calves, +DPs, no LE edema  NEURO: aao x 3, no focal deficits      LABS  Recent Labs   Lab Test  08/16/17   1419  03/05/16   1237   WBC  6.2  5.8   HGB  13.8  14.4   HCT  42.1  43.8   PLT  204  221     Recent Labs   Lab Test  08/16/17   1419  03/05/16   1237   NA  138  140   POTASSIUM  3.8  3.9   CHLORIDE  107  109   CO2  23  22   GLC  113*  101*   BUN  13  20   CR  0.94  0.83   CLIFFORD  9.1  8.8     Recent Labs   Lab Test  08/16/17   1419  12/05/14   1211  07/30/13   1445   CHOL  216*  213*  190   HDL  46  42  43   LDL  132*  130*  117 "   TRIG  190*  204*  148   CHOLHDLRATIO   --   5.0  4.4   NHDL  170*   --    --       ECHOCARDIOGRAM: 12/1/17   Severely dilated left ventricle with severely reduced global LV systolic function, LVEF=20-25% (traced 23%.)  Normal right ventricular structure and function.  No significant valvular abnormalities are noted.  Normal inferior vena cava with normal respiratory variation (estimated RA pressure 3 mmHg.)  No pericardial effusion.  Pulmonary artery systolic pressure cannot be assessed.  Previous study not available for comparison.     Left Ventricle  Left ventricular wall thickness is normal. Severe left ventricular dilation is present. LVIDd 6.4 cm, LVEDVI 135 mL/m2. The Ejection Fraction is estimated at 20-25%. Left ventricular diastolic function is indeterminate. Severe diffuse hypokinesis is present.     Right Ventricle  Global right ventricular function is normal. The right ventricle is normal size.     Atria  Moderate left atrial enlargement is present.     Mitral Valve  Mild mitral insufficiency is present. The cause of the mitral insufficiency appears to be altered left ventricular size and geometry.        Aortic Valve  Mild aortic valve sclerosis is present.     Tricuspid Valve  The tricuspid valve is normal. The peak velocity of the tricuspid regurgitant jet is not obtainable. Pulmonary artery systolic pressure cannot be assessed.     Pulmonic Valve  The pulmonic valve is normal. Trace pulmonic insufficiency is present.     Vessels  The aorta root is normal. The thoracic aorta is normal. The pulmonary artery is normal. The inferior vena cava was normal in size with preserved respiratory variability. Estimated mean right atrial pressure is 3 mmHg.     Pericardium  No pericardial effusion is present.        Compared to Previous Study  Previous study not available for comparison.    IVSd: 0.67 cm  LVIDd: 6.4 cm  LVIDs: 5.6 cm  LVPWd: 0.74 cm  FS: 12.6 %  EDV(Teich): 207.5 ml  ESV(Teich): 152.3 ml  LV  mass(C)d: 178.8 grams  LV mass(C)dI: 90.0 grams/m2  Ao root diam: 3.5 cm  asc Aorta Diam: 3.1 cm  LVOT diam: 2.1 cm  LVOT area: 3.3 cm2  EF(MOD-bp): 23.4 %  LA Volume (BP): 86.3 ml  LA Volume Index (BP): 43.4 ml/m2  RWT: 0.23  MV E max marcus: 103.4 cm/sec  MV A max marcus: 102.3 cm/sec  MV E/A: 1.0  MV dec time: 0.13 sec  PA acc time: 0.10 sec  E/E' av.0  Lateral E/e': 16.7  Medial E/e': 9.4    ASSESSMENT AND PLAN:   - 57M, PMH of Hodgkin's lymphoma (diagnosed , s/p MOPP chemo and radiation, achieved remission), prior smoking, anxiety, depression and dilated cardiomyopathy who presents to establish care for his cardiomyopathy.  - Stage C, NYHA Class III  - The etiology of his HF is likely non ischemic (possibly chemotherapy mediated), however there is no ischemia evaluation available at this time. He has not been willing to undergo additional evaluation.  - I spoke to him again about AICD. He continues to not be interested.  - He is euvolemic today.   - He reluctantly accepted to try medical treatment. I have been able to get him to take lisinopril and I have been up titrating coreg. He feels well and has no side effects. He is willing to continue the up titration. At this visit, I will continue uptitration of lisinopril.    - Increase lisinopril to 5 mg QAM and 2.5 mg PO QHS for 2 weeks; then increase to 5 mg PO BID.  - Continue Lasix 20 mg daily (diuretic naive): Improved NT pro BNP level.  - Continue coreg 12.5 mg PO BID.  - f/u in [facility] and follow up with us in 6 months.     Axel Blanco MD, PhD    CC  Patient Care Team:  Donald Lorenz MD as PCP - General (Family Practice)  Mary Jane Macario APRN CNS as Nurse Practitioner (Clinical Nurse Specialist)  Grzegorz Méndez RN as Nurse Coordinator (Cardiology)  DONALD LORENZ

## 2018-09-07 ENCOUNTER — OFFICE VISIT (OUTPATIENT)
Dept: PSYCHIATRY | Facility: CLINIC | Age: 58
End: 2018-09-07
Attending: CLINICAL NURSE SPECIALIST
Payer: MEDICARE

## 2018-09-07 VITALS
DIASTOLIC BLOOD PRESSURE: 86 MMHG | BODY MASS INDEX: 27.53 KG/M2 | HEART RATE: 56 BPM | WEIGHT: 197.4 LBS | SYSTOLIC BLOOD PRESSURE: 130 MMHG

## 2018-09-07 DIAGNOSIS — F34.1 DYSTHYMIA: Primary | ICD-10-CM

## 2018-09-07 DIAGNOSIS — F41.1 ANXIETY STATE: ICD-10-CM

## 2018-09-07 PROCEDURE — G0463 HOSPITAL OUTPT CLINIC VISIT: HCPCS | Mod: ZF

## 2018-09-07 RX ORDER — DIAZEPAM 10 MG
10 TABLET ORAL 3 TIMES DAILY
Qty: 90 TABLET | Refills: 5 | Status: CANCELLED | OUTPATIENT
Start: 2018-09-07

## 2018-09-07 RX ORDER — DIAZEPAM 10 MG
10 TABLET ORAL 3 TIMES DAILY
Qty: 90 TABLET | Refills: 5 | Status: SHIPPED | OUTPATIENT
Start: 2018-09-07 | End: 2018-11-28

## 2018-09-07 ASSESSMENT — PAIN SCALES - GENERAL: PAINLEVEL: NO PAIN (0)

## 2018-09-07 NOTE — PROGRESS NOTES
"  Outpatient Psychiatry Progress Note     Provider: BRENT Payne CNS  Date: 2018  Service:  Medication follow up with counseling.   Patient Identification: Lul Arriola  : 1960   MRN: [id number]    Lul Arriola is a 57 year old year old male who presents for ongoing psychiatric care.  Lul Arriola was last seen in clinic on 3/9/18.   At that time,   Assessment & Plan       Lul Arriola is seen today for follow up and reports his mood has been stable. Refuses trials of SSRI/SSNRI for anxiety     Diagnosis  Anxiety, Dysthymia     Plan:  Medication: continue current medication  OTC Recommendations: none  Lab Orders:  none  Referrals: none  Release of Information: none  Future Treatment Considerations:per patient agreement to other trials  Return for Follow Up:6 months      ____________________________________________________________________________________________________________________________________________    2018  Today Lul reports he is doing \"about the same.\" He has had some medication changes recently due to CHF. He has difficulty completing ADLs and spends a lot of time alone although he likes to be alone.     Side effects of medication include: none  Psychiatric Review of Systems:  Depression: In the last 2 weeks per PHQ 9 PHQ-9 score: 2, several days having little interest in things and feeling tired.   PHQ-9 SCORE 2015   Total Score 6       Anxiety: stable  [place] none   Psychosis  none.   ADHD n/a    Review of Medical Systems:  Sleep: no concerns  Energy: moderate  Concentration: stable  Appetite: stable  GI Concerns: none  Cardiac concerns: none  Neurological concerns: none  Other medical concerns: no new concerns  Current Substance Use:  Alcohol:denies use  Other drugs: denies  Caffeine: no change  Nicotine: quit 2 years ago  Past Medical History:   Past Medical History:   Diagnosis Date     Cardiomyopathy, nonischemic (H) 3/23/2018 "     Patient Active Problem List   Diagnosis     Anxiety state     Dysthymia     Cardiomyopathy, nonischemic (H)       Allergies:   Allergies   Allergen Reactions     Ultram [Tramadol] Nausea     Baclofen      Imitrex [Sumatriptan] Nausea     Ivp Dye [Contrast Dye] Nausea and Vomiting     N & V, eyes, nose, throat swelled as a child     Keflex [Cephalexin Hcl]      Remeron Soltab      Valtrex [Valacyclovir] Other (See Comments)     Stomach pains     Celebrex [Celecoxib] Palpitations          Current Medications     Current Outpatient Prescriptions Ordered in [facility]   Medication Sig Dispense Refill     ASPIRIN PO Take 81 mg by mouth daily       Aspirin-Acetaminophen-Caffeine (EXCEDRIN PO) Take by mouth as needed       butalbital-acetaminophen-caffeine (FIORICET/ESGIC) -40 MG per tablet Take 1 tablet by mouth every 4 hours as needed for pain 15 tablet 5     carvedilol (COREG) 12.5 MG tablet Take 1 tablet (12.5 mg) by mouth 2 times daily (with meals) 180 tablet 3     carvedilol (COREG) 3.125 MG tablet Take 3 tablets (9.375 mg) by mouth 2 times daily (with meals) (Patient taking differently: Take 12.5 mg by mouth 2 times daily (with meals) ) 180 tablet 1     cyclobenzaprine (FLEXERIL) 10 MG tablet Take 1 tablet (10 mg) by mouth 3 times daily as needed for muscle spasms 90 tablet 5     diazepam (VALIUM) 10 MG tablet Take 1 tablet (10 mg) by mouth 3 times daily 90 tablet 5     furosemide (LASIX) 20 MG tablet Take 1 tablet (20 mg) by mouth daily 90 tablet 3     lisinopril (PRINIVIL/ZESTRIL) 5 MG tablet Take 1 tablet (5 mg) by mouth 2 times daily 180 tablet 3     MOTRIN IB PO Take by mouth as needed for moderate pain       acyclovir (ZOVIRAX) 200 MG capsule Take one po tid (Patient not taking: Reported on 9/7/2018) 270 capsule 3     fluticasone (FLONASE) 50 MCG/ACT spray Spray 2 sprays into both nostrils every 12 hours (Patient not taking: Reported on 3/23/2018) 16 g 5     potassium chloride (K-TAB,KLOR-CON) 10 MEQ  "tablet Take 1 tablet (10 mEq) by mouth 2 times daily (Patient not taking: Reported on 9/7/2018) 90 tablet 3     sildenafil (REVATIO/VIAGRA) 100 MG cap/tab Take 0.5-1 tablets ( mg) by mouth daily as needed for erectile dysfunction Take 30 min to 4 hours before intercourse.  Never use with nitroglycerin, terazosin or doxazosin. (Patient not taking: Reported on 12/1/2017) 12 tablet 11     No current Epic-ordered facility-administered medications on file.         Mental Status Exam     Appearance:  No apparent distress and casually dressed  Behavior/relationship to examiner/demeanor:  Cooperative, Engaged and Pleasant  Orientation: Oriented to person, place, time and situation  Psychomotor: normal form  Speech Rate:  Normal  Speech Spontaneity:  Normal  Mood:  \"okay\"  Affect:  Appropriate/mood-congruent  Thought Process (Associations):  Goal directed  Thought Content:  denies suicidal ideation, intent or thoughts and patient denies auditory and visual hallucinations  Abnormal Perception:  None  Attention/Concentration:  Normal  Insight:  Adequate  Judgment:  Adequate for safety      Results     Vital signs: /86  Pulse 56  Wt 89.5 kg (197 lb 6.4 oz)  BMI 27.53 kg/m2    Laboratory Data:  reviewed    Assessment & Plan     Lul Arriola is seen today for follow up and reports his mood continues to be stable. He is followed by cardiology for CHF. He has noticed feeling a \"not himself\" with his heart meds.     Diagnosis  Encounter Diagnoses   Name Primary?     Anxiety state      Dysthymia Yes       Plan:  Medication: continue current medications   OTC Recommendations: none  Lab Orders:  none  Referrals: none  Release of Information: none  Future Treatment Considerations: per symptoms  Return for Follow Up: 6 months   The risks, benefits, alternatives and side effects have been discussed and are understood by the patient. The patient understands the risks of using street drugs or alcohol. There are no " medical contraindications, the patient agrees to treatment, and has the capacity to do so. The patient understands to call 911 or come to the nearest ED if life threatening or urgent symptoms present.  In addition time was spent counseling the patient and/or coordinating care regarding review of social and occupational functioning.  In addition patient was counseled on health and wellness practices to augment medication treatment of symptoms. See note for details.    Aylin Urbano, Psychiatric/Mental Health Nurse Practitioner Student, acting as scribe for BRENT Payne CNS  I , Mary Jane Macario, personally performed the entire clinical encounter as documented by Aylin Urbano RN, DNP student        BRENT Payne 9/7/2018

## 2018-09-07 NOTE — MR AVS SNAPSHOT
After Visit Summary   2018    Lul Arriola    MRN: [id number]           Patient Information     Date Of Birth          1960        Visit Information        Provider Department      2018 1:15 PM Mary Jane Macario APRN CNS [facility]        Today's Diagnoses     Dysthymia    -  1    Anxiety state           Follow-ups after your visit        Follow-up notes from your care team     Return in about 6 months (around 3/7/2019).      Your next 10 appointments already scheduled     Dec 12, 2018 12:30 PM CST   (Arrive by 12:15 PM)   Return Visit with Dalton Reynolds MD   [facility] ([facility]    [address]  [address]  [facility] [id number] [phone]            Mar 15, 2019  1:15 PM CDT   Adult Med Follow UP with BRENT Payne CNS   Psychiatry Clinic ([facility])    [place] [id number]  [address] [address] [phone]              Who to contact     Please call your clinic at [phone] to:    Ask questions about your health    Make or cancel appointments    Discuss your medicines    Learn about your test results    Speak to your doctor            Additional Information About Your Visit        MyChart Information     [employer] is an electronic gateway that provides easy, online access to your medical records. With [employer], you can request a clinic appointment, read your test results, renew a prescription or communicate with your care team.     To sign up for [employer] visit the website at [email or web address]   You will be asked to enter the access code listed below, as well as some personal information. Please follow the directions to create your username and password.     Your access code is: [email or web address]  Expires: 2018 11:40 AM     Your access code will  in 90 days. If you need help or a new code, please contact your [facility]  [facility] or call [phone] for assistance.        Care EveryWhere ID     This is your Care EveryWhere ID. This could be used by other organizations to access your [facility] medical records  [id number]        Your Vitals Were     Pulse BMI (Body Mass Index)                56 27.53 kg/m2           Blood Pressure from Last 3 Encounters:   09/07/18 130/86   06/15/18 118/74   03/23/18 117/77    Weight from Last 3 Encounters:   09/07/18 89.5 kg (197 lb 6.4 oz)   06/15/18 87.9 kg (193 lb 12.8 oz)   03/23/18 88 kg (194 lb)              Today, you had the following     No orders found for display         Today's Medication Changes          These changes are accurate as of 9/7/18 11:59 PM.  If you have any questions, ask your nurse or doctor.               These medicines have changed or have updated prescriptions.        Dose/Directions    * carvedilol 3.125 MG tablet   Commonly known as:  COREG   This may have changed:  how much to take   Used for:  Secondary cardiomyopathy (H)        Dose:  9.375 mg   Take 3 tablets (9.375 mg) by mouth 2 times daily (with meals)   Quantity:  180 tablet   Refills:  1       * carvedilol 12.5 MG tablet   Commonly known as:  COREG   This may have changed:  Another medication with the same name was changed. Make sure you understand how and when to take each.   Used for:  Cardiomyopathy, nonischemic (H), Secondary cardiomyopathy (H)        Dose:  12.5 mg   Take 1 tablet (12.5 mg) by mouth 2 times daily (with meals)   Quantity:  180 tablet   Refills:  3       * Notice:  This list has 2 medication(s) that are the same as other medications prescribed for you. Read the directions carefully, and ask your doctor or other care provider to review them with you.         Where to get your medicines      Some of these will need a paper prescription and others can be bought over the counter.  Ask your nurse if you have questions.     Bring a paper prescription for each of these  medications     diazepam 10 MG tablet                Primary Care Provider Office Phone # Fax #    Donald Lorenz [phone]       [address] [address]        Equal Access to Services     [address] : Hadolivier lars espinoza james Rios, waaxda luqadaha, qaybta kaalmada adeegyada, karine kern claudine bains. So [facility] [phone].    ATENCIÓN: Si habla español, tiene a daniel disposición servicios gratuitos de asistencia lingüística. Llame al [phone].    We comply with applicable federal civil rights laws and [place] laws. We do not discriminate on the basis of race, color, national origin, age, disability, sex, sexual orientation, or gender identity.            Thank you!     Thank you for choosing PS[facility] CLIN[facility]  for your care. Our goal is always to provide you with excellent care. Hearing back from our patients is one way we can continue to improve our services. Please take a few minutes to complete the written survey that you may receive in the mail after your visit with us. Thank you!             Your Updated Medication List - Protect others around you: Learn how to safely use, store and throw away your medicines at www.disposemymeds.org.          This list is accurate as of 9/7/18 11:59 PM.  Always use your most recent med list.                   Brand Name Dispense Instructions for use Diagnosis    acyclovir 200 MG capsule    ZOVIRAX    270 capsule    Take one po tid    HSV (herpes simplex virus) infection       ASPIRIN PO      Take 81 mg by mouth daily        butalbital-acetaminophen-caffeine -40 MG per tablet    FIORICET/ESGIC    15 tablet    Take 1 tablet by mouth every 4 hours as needed for pain    Neck pain, Tension headache       * carvedilol 3.125 MG tablet    COREG    180 tablet    Take 3 tablets (9.375 mg) by mouth 2 times daily (with meals)    Secondary cardiomyopathy (H)       * carvedilol 12.5 MG tablet    COREG    180 tablet     Take 1 tablet (12.5 mg) by mouth 2 times daily (with meals)    Cardiomyopathy, nonischemic (H), Secondary cardiomyopathy (H)       cyclobenzaprine 10 MG tablet    FLEXERIL    90 tablet    Take 1 tablet (10 mg) by mouth 3 times daily as needed for muscle spasms    Neck pain       diazepam 10 MG tablet    VALIUM    90 tablet    Take 1 tablet (10 mg) by mouth 3 times daily    Anxiety state       EXCEDRIN PO      Take by mouth as needed        fluticasone 50 MCG/ACT spray    FLONASE    16 g    Spray 2 sprays into both nostrils every 12 hours    Chronic allergic rhinitis, unspecified seasonality, unspecified trigger       furosemide 20 MG tablet    LASIX    90 tablet    Take 1 tablet (20 mg) by mouth daily    Systolic congestive heart failure, unspecified congestive heart failure chronicity (H), Benign essential hypertension       lisinopril 5 MG tablet    PRINIVIL/ZESTRIL    180 tablet    Take 1 tablet (5 mg) by mouth 2 times daily    Systolic congestive heart failure, unspecified congestive heart failure chronicity (H), Benign essential hypertension       MOTRIN IB PO      Take by mouth as needed for moderate pain        potassium chloride 10 MEQ tablet    K-TAB,KLOR-CON    90 tablet    Take 1 tablet (10 mEq) by mouth 2 times daily    Cardiomyopathy, nonischemic (H)       sildenafil 100 MG tablet    VIAGRA    12 tablet    Take 0.5-1 tablets ( mg) by mouth daily as needed for erectile dysfunction Take 30 min to 4 hours before intercourse.  Never use with nitroglycerin, terazosin or doxazosin.    Other male erectile dysfunction       * Notice:  This list has 2 medication(s) that are the same as other medications prescribed for you. Read the directions carefully, and ask your doctor or other care provider to review them with you.

## 2018-09-11 ASSESSMENT — PATIENT HEALTH QUESTIONNAIRE - PHQ9: SUM OF ALL RESPONSES TO PHQ QUESTIONS 1-9: 2

## 2018-11-27 ENCOUNTER — PATIENT OUTREACH (OUTPATIENT)
Dept: CARE COORDINATION | Facility: CLINIC | Age: 58
End: 2018-11-27

## 2018-11-28 ENCOUNTER — OFFICE VISIT (OUTPATIENT)
Dept: FAMILY MEDICINE | Facility: CLINIC | Age: 58
End: 2018-11-28
Payer: MEDICARE

## 2018-11-28 VITALS
HEART RATE: 80 BPM | BODY MASS INDEX: 27.2 KG/M2 | DIASTOLIC BLOOD PRESSURE: 77 MMHG | WEIGHT: 195 LBS | SYSTOLIC BLOOD PRESSURE: 112 MMHG | RESPIRATION RATE: 16 BRPM | OXYGEN SATURATION: 93 %

## 2018-11-28 DIAGNOSIS — M54.2 NECK PAIN: ICD-10-CM

## 2018-11-28 DIAGNOSIS — N52.8 OTHER MALE ERECTILE DYSFUNCTION: ICD-10-CM

## 2018-11-28 DIAGNOSIS — Z00.00 HEALTH CARE MAINTENANCE: ICD-10-CM

## 2018-11-28 DIAGNOSIS — J30.89 SEASONAL ALLERGIC RHINITIS DUE TO OTHER ALLERGIC TRIGGER: ICD-10-CM

## 2018-11-28 DIAGNOSIS — G44.209 TENSION HEADACHE: ICD-10-CM

## 2018-11-28 DIAGNOSIS — Z12.5 ENCOUNTER FOR SCREENING FOR MALIGNANT NEOPLASM OF PROSTATE: ICD-10-CM

## 2018-11-28 DIAGNOSIS — F41.1 ANXIETY STATE: ICD-10-CM

## 2018-11-28 DIAGNOSIS — B00.9 HSV (HERPES SIMPLEX VIRUS) INFECTION: ICD-10-CM

## 2018-11-28 DIAGNOSIS — K21.9 GASTROESOPHAGEAL REFLUX DISEASE WITHOUT ESOPHAGITIS: Primary | ICD-10-CM

## 2018-11-28 DIAGNOSIS — L21.9 SEBORRHEIC DERMATITIS: ICD-10-CM

## 2018-11-28 LAB — PSA SERPL-ACNC: 0.48 UG/L (ref 0–4)

## 2018-11-28 RX ORDER — DIAZEPAM 10 MG
10 TABLET ORAL 3 TIMES DAILY
Qty: 90 TABLET | Refills: 5 | Status: SHIPPED | OUTPATIENT
Start: 2018-11-28 | End: 2019-03-11

## 2018-11-28 RX ORDER — SILDENAFIL 100 MG/1
50-100 TABLET, FILM COATED ORAL DAILY PRN
Qty: 12 TABLET | Refills: 11 | Status: SHIPPED | OUTPATIENT
Start: 2018-11-28 | End: 2019-11-04

## 2018-11-28 RX ORDER — BUTALBITAL, ACETAMINOPHEN AND CAFFEINE 50; 325; 40 MG/1; MG/1; MG/1
1 TABLET ORAL EVERY 4 HOURS PRN
Qty: 15 TABLET | Refills: 5 | Status: SHIPPED | OUTPATIENT
Start: 2018-11-28 | End: 2019-05-23

## 2018-11-28 RX ORDER — CYCLOBENZAPRINE HCL 10 MG
10 TABLET ORAL 3 TIMES DAILY PRN
Qty: 90 TABLET | Refills: 5 | Status: SHIPPED | OUTPATIENT
Start: 2018-11-28 | End: 2019-11-04

## 2018-11-28 RX ORDER — ACYCLOVIR 200 MG/1
CAPSULE ORAL
Qty: 270 CAPSULE | Refills: 3 | Status: SHIPPED | OUTPATIENT
Start: 2018-11-28 | End: 2019-11-04

## 2018-11-28 RX ORDER — KETOCONAZOLE 20 MG/G
CREAM TOPICAL 2 TIMES DAILY
Qty: 60 G | Refills: 1 | Status: SHIPPED | OUTPATIENT
Start: 2018-11-28 | End: 2020-09-11

## 2018-11-28 RX ORDER — FLUTICASONE PROPIONATE 50 MCG
2 SPRAY, SUSPENSION (ML) NASAL EVERY 12 HOURS
Qty: 16 G | Refills: 5 | Status: SHIPPED | OUTPATIENT
Start: 2018-11-28 | End: 2019-11-04

## 2018-11-28 ASSESSMENT — PAIN SCALES - GENERAL: PAINLEVEL: MILD PAIN (3)

## 2018-11-28 NOTE — PROGRESS NOTES
[facility]   Donald Lorenz MD  11/28/2018      Chief Complaint:   Recheck Medication       History of Present Illness:   Lul Arriola is a 58 year old male with a history of nonischemic cardiomyopathy, who presents alone for evaluation of a medication recheck.    Cardiovascular health: He has been following with cardiology regarding his history of cardiomyopathy. At this time, they are considering placing a pacemaker with defibrillator. Into the future, they expect the patient to be eligible for a heart transplant. He is currently taking Coreg, Lasix, and Prinivil. Overall, this has lead to the patient feeling more hopeless about this health and the effort that he is making.     Acid reflux: He has previously been relieved using ranitidine. Recently, the patient has been experiencing worsening acid reflux with belching. He has been attempting to control his symptoms with OTC acid reflux medications including TUMs.     Dizziness: For two months over this summer, the patient experienced room-spinning dizziness. He suspected that he knocked something further into his ear canal causing this dizziness. The patient reports a two week period where he was unable to drive, shower, and move about his home without dealing with his symptoms.     Skin flaking: The patient has experienced severe skin flaking on his face and scalp. He reports using a number of tools to help remove the skin from his face including tooth picks, screw drivers, and Listerine. He feels embarrassed by the amount of skin peeling off of his face.     Health Care Maintenance/Other:  1. Colonoscopy- completed in April 2018, normal per patient report   2. Immunizations- declined all required   3. PSA check- due   4. Frequent headaches      Review of Systems:   Pertinent items are noted in HPI, remainder of complete ROS is negative.      Active Medications:      Aspirin-Acetaminophen-Caffeine (EXCEDRIN PO), Take by mouth as  needed, Disp: , Rfl:      butalbital-acetaminophen-caffeine (FIORICET/ESGIC) -40 MG per tablet, Take 1 tablet by mouth every 4 hours as needed for pain, Disp: 15 tablet, Rfl: 5     carvedilol (COREG) 12.5 MG tablet, Take 1 tablet (12.5 mg) by mouth 2 times daily (with meals), Disp: 180 tablet, Rfl: 3     cyclobenzaprine (FLEXERIL) 10 MG tablet, Take 1 tablet (10 mg) by mouth 3 times daily as needed for muscle spasms, Disp: 90 tablet, Rfl: 5     diazepam (VALIUM) 10 MG tablet, Take 1 tablet (10 mg) by mouth 3 times daily, Disp: 90 tablet, Rfl: 5     furosemide (LASIX) 20 MG tablet, Take 1 tablet (20 mg) by mouth daily, Disp: 90 tablet, Rfl: 3     lisinopril (PRINIVIL/ZESTRIL) 5 MG tablet, Take 1 tablet (5 mg) by mouth 2 times daily, Disp: 180 tablet, Rfl: 3     acyclovir (ZOVIRAX) 200 MG capsule, Take one po tid (Patient not taking: Reported on 9/7/2018), Disp: 270 capsule, Rfl: 3     ASPIRIN PO, Take 81 mg by mouth daily, Disp: , Rfl:      carvedilol (COREG) 3.125 MG tablet, Take 3 tablets (9.375 mg) by mouth 2 times daily (with meals) (Patient not taking: Reported on 11/28/2018), Disp: 180 tablet, Rfl: 1     fluticasone (FLONASE) 50 MCG/ACT spray, Spray 2 sprays into both nostrils every 12 hours (Patient not taking: Reported on 3/23/2018), Disp: 16 g, Rfl: 5     MOTRIN IB PO, Take by mouth as needed for moderate pain, Disp: , Rfl:      potassium chloride (K-TAB,KLOR-CON) 10 MEQ tablet, Take 1 tablet (10 mEq) by mouth 2 times daily (Patient not taking: Reported on 9/7/2018), Disp: 90 tablet, Rfl: 3     sildenafil (REVATIO/VIAGRA) 100 MG cap/tab, Take 0.5-1 tablets ( mg) by mouth daily as needed for erectile dysfunction Take 30 min to 4 hours before intercourse.  Never use with nitroglycerin, terazosin or doxazosin. (Patient not taking: Reported on 12/1/2017), Disp: 12 tablet, Rfl: 11      Allergies:   Ultram [tramadol]  Baclofen  Imitrex [sumatriptan]  Ivp dye [contrast dye]  Keflex [cephalexin  hcl]  Remeron soltab  Valtrex [valacyclovir]  Celebrex [celecoxib]      Past Medical History:  Nonischemic cardiomyopathy   Anxiety state   Dysthymia      Past Surgical History:  The patient does not have any pertinent past surgical history.      Social History:   The patient is single, a former smoker, and does not consume alcohol.      Physical Exam:   /77 (BP Location: Right arm, Patient Position: Sitting, Cuff Size: Adult Regular)  Pulse 80  Resp 16  Wt 88.5 kg (195 lb)  SpO2 93%  BMI 27.2 kg/m2      Constitutional: Alert. In no distress.  Head: Normocephalic. No masses, lesions, or abnormalities.  Face: Redness in eyebrows and cheeks, no raised lesions or visible capillaries.   Psychiatric: Mentation appears normal. Normal affect.     Assessment and Plan:  1. Heart failure  Patient follows with our cardiology clinic here.     2. Gastroesophageal reflux disease without esophagitis  Taking frequent TUMs, hopefully daily Zantac use can cut that down.   - ranitidine (ZANTAC) 150 MG tablet  Dispense: 180 tablet; Refill: 3    3. Tension headaches with neck pain   Patient has used this long term with good success.   - butalbital-acetaminophen-caffeine (FIORICET/ESGIC) -40 MG tablet  Dispense: 15 tablet; Refill: 5    4. HSV (herpes simplex virus) infection  Currently taking as needed for flares.   - acyclovir (ZOVIRAX) 200 MG capsule  Dispense: 270 capsule; Refill: 3    5. Seasonal allergic rhinitis due to other allergic trigger  - fluticasone (FLONASE) 50 MCG/ACT nasal spray  Dispense: 16 g; Refill: 5    6. Other male erectile dysfunction  - sildenafil (VIAGRA) 100 MG tablet  Dispense: 12 tablet; Refill: 11    7. Encounter for screening for malignant neoplasm of prostate   Declined last year though open to screening this year.   - PSA screen    8. Seborrheic dermatitis  If not better with ketoconazole, will consider dermatology consult.   - ketoconazole (NIZORAL) 2 % external cream  Dispense: 60 g;  Refill: 1     9. Health care maintenance  Patient declines immunizations. Normal colonoscopy reviewed.   - PSA screen    I did accidentally printout out and then shredded a valium rx today.      Follow-up: Return in about 1 year (around 11/28/2019).         Scribe Disclosure:  I, Bebe Strosibelskavelina, am serving as a scribe to document services personally performed by Donald Lorenz MD at this visit, based upon the provider's statements to me. All documentation has been reviewed by the aforementioned provider prior to being entered into the official medical record.     Portions of this medical record were completed by a scribe. UPON MY REVIEW AND AUTHENTICATION BY ELECTRONIC SIGNATURE, this confirms (a) I performed the applicable clinical services, and (b) the record is accurate.   Donald Lorenz MD

## 2018-11-28 NOTE — NURSING NOTE
Chief Complaint   Patient presents with     Recheck Medication     Pt is here to refill medications.         Petros Chua, EMT on 11/28/2018 at 12:45 PM

## 2018-11-28 NOTE — MR AVS SNAPSHOT
After Visit Summary   11/28/2018    Lul Arriola    MRN: [id number]           Patient Information     Date Of Birth          1960        Visit Information        Provider Department      11/28/2018 12:50 PM Donald Lorenz MD [facility]        Today's Diagnoses     Gastroesophageal reflux disease without esophagitis    -  1    HSV (herpes simplex virus) infection        Neck pain        Tension headache        Health care maintenance        Seasonal allergic rhinitis due to other allergic trigger        Other male erectile dysfunction        Encounter for screening for malignant neoplasm of prostate         Seborrheic dermatitis          Care Instructions    Primary Care Center Medication Refill Request Information:  * Please contact your pharmacy regarding ANY request for medication refills.  ** [facility] Prescription Fax = [phone]  * Please allow 3 business days for routine medication refills.  * Please allow 5 business days for controlled substance medication refills.     Primary Care Center Test Result notification information:  *You will be notified with in 7-10 days of your appointment day regarding the results of your test.  If you are on MyChart you will be notified as soon as the provider has reviewed the results and signed off on them.    Primary [facility] Center: [phone]             Follow-ups after your visit        Follow-up notes from your care team     Return in about 1 year (around 11/28/2019).      Your next 10 appointments already scheduled     Dec 12, 2018 12:30 PM CST   (Arrive by 12:15 PM)   Return Visit with Dalton Reynolds MD   [facility] ([facility])    [address] [place] [address]  [address] [place] [id number]   [phone]            Mar 15, 2019  1:15 PM CDT   Adult Med Follow UP with BRENT Payne CNS   Psychiatry Clinic ([facility])    [place] [id number] [facility]  [address] [place] [phone]              Future tests that were ordered for you today     Open Future Orders        Priority Expected Expires Ordered    PSA screen Routine 11/28/2018 11/28/2019 11/28/2018            Who to contact     Please call your clinic at [phone] to:    Ask questions about your health    Make or cancel appointments    Discuss your medicines    Learn about your test results    Speak to your doctor            Additional Information About Your Visit        Care EveryWhere ID     This is your Care EveryWhere ID. This could be used by other organizations to access your [facility] medical records  [id number]        Your Vitals Were     Pulse Respirations Pulse Oximetry BMI (Body Mass Index)          80 16 93% 27.2 kg/m2         Blood Pressure from Last 3 Encounters:   11/28/18 112/77   06/15/18 118/74   03/23/18 117/77    Weight from Last 3 Encounters:   11/28/18 88.5 kg (195 lb)   06/15/18 87.9 kg (193 lb 12.8 oz)   03/23/18 88 kg (194 lb)                 Today's Medication Changes          These changes are accurate as of 11/28/18  1:09 PM.  If you have any questions, ask your nurse or doctor.               Start taking these medicines.        Dose/Directions    ketoconazole 2 % external cream   Commonly known as:  NIZORAL   Used for:  Seborrheic dermatitis   Started by:  Donald Lorenz MD        Apply topically 2 times daily   Quantity:  60 g   Refills:  1       ranitidine 150 MG tablet   Commonly known as:  ZANTAC   Used for:  Gastroesophageal reflux disease without esophagitis   Started by:  Donald Lorenz MD        Dose:  150 mg   Take 1 tablet (150 mg) by mouth 2 times daily   Quantity:  180 tablet   Refills:  3         These medicines have changed or have updated prescriptions.        Dose/Directions    sildenafil 100 MG tablet   Commonly known as:  VIAGRA   This may have changed:  reasons to take this   Used for:  Other male erectile dysfunction    Changed by:  Donald Lorenz MD        Dose:   mg   Take 0.5-1 tablets ( mg) by mouth daily as needed Take 30 min to 4 hours before intercourse.  Never use with nitroglycerin, terazosin or doxazosin.   Quantity:  12 tablet   Refills:  11            Where to get your medicines      Some of these will need a paper prescription and others can be bought over the counter.  Ask your nurse if you have questions.     Bring a paper prescription for each of these medications     acyclovir 200 MG capsule    butalbital-acetaminophen-caffeine -40 MG tablet    fluticasone 50 MCG/ACT nasal spray    ketoconazole 2 % external cream    ranitidine 150 MG tablet    sildenafil 100 MG tablet                Primary Care Provider Office Phone # Fax #    Donald Lorenz [phone]       [address] [address]        Equal Access to Services     [facility]: Hadii lars espinoza hadasho Soameena, waaxda luqadaha, qaybta kaalmada adeegyada, waxay elsain hayzeen natalie hcow . So [facility] [phone].    ATENCIÓN: Si habla español, tiene a daniel disposición servicios gratuitos de asistencia lingüística. Anisha al [phone].    We comply with applicable federal civil rights laws and [place] laws. We do not discriminate on the basis of race, color, national origin, age, disability, sex, sexual orientation, or gender identity.            Thank you!     Thank you for choosing [facility]  for your care. Our goal is always to provide you with excellent care. Hearing back from our patients is one way we can continue to improve our services. Please take a few minutes to complete the written survey that you may receive in the mail after your visit with us. Thank you!             Your Updated Medication List - Protect others around you: Learn how to safely use, store and throw away your medicines at www.disposemymeds.org.          This list is accurate as of 11/28/18   1:09 PM.  Always use your most recent med list.                   Brand Name Dispense Instructions for use Diagnosis    acyclovir 200 MG capsule    ZOVIRAX    270 capsule    Take one po tid    HSV (herpes simplex virus) infection       ASPIRIN PO      Take 81 mg by mouth daily        butalbital-acetaminophen-caffeine -40 MG tablet    FIORICET/ESGIC    15 tablet    Take 1 tablet by mouth every 4 hours as needed for pain    Neck pain, Tension headache       * carvedilol 3.125 MG tablet    COREG    180 tablet    Take 3 tablets (9.375 mg) by mouth 2 times daily (with meals)    Secondary cardiomyopathy (H)       * carvedilol 12.5 MG tablet    COREG    180 tablet    Take 1 tablet (12.5 mg) by mouth 2 times daily (with meals)    Cardiomyopathy, nonischemic (H), Secondary cardiomyopathy (H)       cyclobenzaprine 10 MG tablet    FLEXERIL    90 tablet    Take 1 tablet (10 mg) by mouth 3 times daily as needed for muscle spasms    Neck pain       diazepam 10 MG tablet    VALIUM    90 tablet    Take 1 tablet (10 mg) by mouth 3 times daily    Anxiety state       EXCEDRIN PO      Take by mouth as needed        fluticasone 50 MCG/ACT nasal spray    FLONASE    16 g    Spray 2 sprays into both nostrils every 12 hours    Seasonal allergic rhinitis due to other allergic trigger       furosemide 20 MG tablet    LASIX    90 tablet    Take 1 tablet (20 mg) by mouth daily    Systolic congestive heart failure, unspecified congestive heart failure chronicity, Benign essential hypertension       ketoconazole 2 % external cream    NIZORAL    60 g    Apply topically 2 times daily    Seborrheic dermatitis       lisinopril 5 MG tablet    PRINIVIL/ZESTRIL    180 tablet    Take 1 tablet (5 mg) by mouth 2 times daily    Systolic congestive heart failure, unspecified congestive heart failure chronicity, Benign essential hypertension       MOTRIN IB PO      Take by mouth as needed for moderate pain        potassium chloride ER 10 MEQ CR tablet     K-TAB/KLOR-CON    90 tablet    Take 1 tablet (10 mEq) by mouth 2 times daily    Cardiomyopathy, nonischemic (H)       ranitidine 150 MG tablet    ZANTAC    180 tablet    Take 1 tablet (150 mg) by mouth 2 times daily    Gastroesophageal reflux disease without esophagitis       sildenafil 100 MG tablet    VIAGRA    12 tablet    Take 0.5-1 tablets ( mg) by mouth daily as needed Take 30 min to 4 hours before intercourse.  Never use with nitroglycerin, terazosin or doxazosin.    Other male erectile dysfunction       * Notice:  This list has 2 medication(s) that are the same as other medications prescribed for you. Read the directions carefully, and ask your doctor or other care provider to review them with you.

## 2018-11-28 NOTE — PATIENT INSTRUCTIONS
[facility] Medication Refill Request Information:  * Please contact your pharmacy regarding ANY request for medication refills.  ** [facility] Prescription Fax = [phone]  * Please allow 3 business days for routine medication refills.  * Please allow 5 business days for controlled substance medication refills.     [facility] Test Result notification information:  *You will be notified with in 7-10 days of your appointment day regarding the results of your test.  If you are on MyChart you will be notified as soon as the provider has reviewed the results and signed off on them.    [facility]: [phone]

## 2018-11-29 ENCOUNTER — TELEPHONE (OUTPATIENT)
Dept: CARDIOLOGY | Facility: CLINIC | Age: 58
End: 2018-11-29

## 2018-11-29 DIAGNOSIS — I10 BENIGN ESSENTIAL HYPERTENSION: ICD-10-CM

## 2018-11-29 NOTE — TELEPHONE ENCOUNTER
[facility] Call Center    Phone Message    May a detailed message be left on voicemail: no    Reason for Call: Medication Refill Request    Has the patient contacted the pharmacy for the refill? Yes   Name of medication being requested: furosemide (LASIX) 20 MG tablet  Provider who prescribed the medication: Dr. Blanco, but Pt is scheduled to see Dr. Reynolds on 12/12/18  Pharmacy: [facility],  # [phone]  Date medication is needed: Asap, Pt has a few days left of meds      Action Taken: Message routed to:  [facility] ([facility]): [facility]

## 2018-12-04 RX ORDER — FUROSEMIDE 20 MG
20 TABLET ORAL DAILY
Qty: 30 TABLET | Refills: 0 | Status: SHIPPED | OUTPATIENT
Start: 2018-12-04 | End: 2019-01-15

## 2018-12-12 ENCOUNTER — OFFICE VISIT (OUTPATIENT)
Dept: CARDIOLOGY | Facility: CLINIC | Age: 58
End: 2018-12-12
Attending: INTERNAL MEDICINE
Payer: MEDICARE

## 2018-12-12 VITALS
HEIGHT: 71 IN | BODY MASS INDEX: 27.23 KG/M2 | OXYGEN SATURATION: 94 % | WEIGHT: 194.5 LBS | SYSTOLIC BLOOD PRESSURE: 127 MMHG | DIASTOLIC BLOOD PRESSURE: 84 MMHG | HEART RATE: 78 BPM

## 2018-12-12 DIAGNOSIS — Z91.041 CONTRAST MEDIA ALLERGY: Primary | ICD-10-CM

## 2018-12-12 DIAGNOSIS — I42.8 CARDIOMYOPATHY, NONISCHEMIC (H): ICD-10-CM

## 2018-12-12 PROCEDURE — G0463 HOSPITAL OUTPT CLINIC VISIT: HCPCS | Mod: ZF

## 2018-12-12 PROCEDURE — 99214 OFFICE O/P EST MOD 30 MIN: CPT | Mod: ZP | Performed by: INTERNAL MEDICINE

## 2018-12-12 ASSESSMENT — MIFFLIN-ST. JEOR: SCORE: 1729.38

## 2018-12-12 ASSESSMENT — PAIN SCALES - GENERAL: PAINLEVEL: NO PAIN (0)

## 2018-12-12 NOTE — NURSING NOTE
Med Reconcile: Reviewed and verified all current medications with the patient. The updated medication list was printed and given to the patient.  Return Appointment: Patient given instructions regarding scheduling next clinic visit. Patient demonstrated understanding of this information and agreed to call with further questions or concerns.  Referral: Placed with Allergy Specialist to access contrast Allergy.  Patient stated he understood all health information given and agreed to call with further questions or concerns.    Dede Amaya LPN

## 2018-12-12 NOTE — PATIENT INSTRUCTIONS
Patient Instructions:  It was a pleasure to see you in the cardiology clinic today.      If you have any questions, you can reach my nurse, Dede Amaya LPN, at [phone].  Press Option #1 for the [facility], and then press Option #3 for nursing.    We are encouraging the use of [employer] to communicate with your HealthCare Provider    Medication Changes: None.    Studies Ordered: None.    The results from today include: None.    Recommendations: A referral was placed to an allergy specialist to assess your contrast allergy.  Scheduling can be completed at [phone].    Please follow up: With Dr. Reynolds based on results of allergy testing.    Sincerely,    Dalton Reynolds MD     If you have an urgent need after hours (8:00 am to 4:30 pm) please call [phone] and ask for the cardiology fellow on call.

## 2018-12-12 NOTE — LETTER
12/12/2018      RE: Lul Arriola  [address] [facility] [id number]       Dear Colleague,    Thank you for the opportunity to participate in the care of your patient, Lul Arriola, at the [facility]. Please see a copy of my visit note below.    [id number]      Please do not hesitate to contact me if you have any questions/concerns.     Sincerely,     Dalton Reynolds MD

## 2018-12-14 NOTE — PROGRESS NOTES
"2018             Donald Lorenz MD   Primary Care Center   [address] [place] [address], [address]   [place] [address]      RE:    Lul Arriola   MRN:  [id number]   :  1960      Dear Dr. Lorenz:      It was a pleasure participating in the care of your patient, Mr. Lul Arriola.  As you know, he is a 58-year-old gentleman who I see today to establish cardiac his prior primary cardiologist, Dr. Blanco.        His past medical history is significant for the followin.  Hypertension.   2.  Hyperlipidemia.   3.  Anxiety.   4.  Dysthymia.   5.  Hodgkin lymphoma, status post chemo and radiation.   6.  Depression/anxiety.   7.  Tobacco abuse.   8.  Osteoporosis.      His cardiac history is significant for a severe cardiomyopathy thought secondary to chemotherapy.  The patient has a history of Hodgkin diagnosed in , status post chemo and radiation.  Apparently according to Dr. Blanco's prior documentation, the patient never had an angiogram or any type of ischemic workup.  The patient has refused it in the past.  He has also refused ICD placement as well.      He is somewhat bitter and frustrated and borderline hostile in terms of his overall healthcare experience.  He keeps repeating the phrase that he \"is a cancer patient and that his life is over now that his heart function is low and that his lungs are also shot.\"  He, however, denies gross chest pain, problems breathing, PND, orthopnea, edema, palpitations, syncope or near-syncope.  He says he does not exercise.  He can walk through [employer] for 20 minutes without stopping.  He has to stop secondary to left hip pain usually.      In terms of his cardiac risk factors, no history of diabetes.  He does have a history of hypertension.  He quit smoking 2-1/2 years ago.  He denies family history of heart disease.  He does have hyperlipidemia.      He used to work in a warehouse.      CURRENT MEDICATIONS:     1.  Carvedilol 12.5 twice " daily.   2.  Lasix 20 mg a day.   3.  Lisinopril 5 mg twice daily.      PHYSICAL EXAMINATION:     VITAL SIGNS:  Blood pressure is 127/84 with a pulse of 78.  His weight is 194 pounds.   NECK:  Exam reveals normal jugular venous pressure, no significant hepatojugular reflux.   LUNGS:  Clear to auscultation.  Respiratory effort is normal.   CARDIAC:  Regular rate and rhythm, soft 2/6 systolic murmur at the apex radiating to the axilla.   ABDOMEN:  Belly soft, nontender.   EXTREMITIES:  Without gross edema.      Echocardiogram 12/01/2017 reveals ejection fraction of 20%-25%, mild MR.      LABORATORY DATA:  On 08/16/2017, hemoglobin, TSH normal.  On 01/05/2018, potassium 3.8, GFR normal.        IMPRESSION:      Lul is a 58-year-old gentleman whose cardiac history is significant for a severe, likely nonischemic cardiomyopathy, presumed secondary to chemotherapy in the past.  He is previously a patient of Dr. Blanco, however, Dr. Blanco confirms that the patient never underwent an ischemic workup in the past.  He has been reluctant to even continue medical therapy at all.  He has refused an ICD and has refused further testing to work up the underlying etiology for his cardiomyopathy.      We did discuss this once again today, and again he expresses his frustration with the healthcare system in general.        PLAN:     1.  The patient relates a reaction to IVP dye as a child where his eyes, throat and face swelled up.  Unclear if he still has an active allergy to IV contrast.  We did discuss whether or not to pursue coronary angiography or coronary CTA as part of his ischemic workup and the present time, he refuses coronary angiography quite adamantly, but seems possibly willing to undergo coronary CTA if his risk was not extensively elevated due to his possible allergy.     2.  Allergy referral to investigate whether or not the patient has an active contrast dye allergy.  If he does, then we will pretreat him and  consider coronary CTA at that time.  If not, then we can proceed with the study without pretreatment.     3.  The patient continues to refuse additional medications and/or ICD at this point in time.  He understands that he is at increased risk for sudden death/poorer long term prognosis and does not wish further intervention or treatment.     4.  Further updates to follow.      Once again, it was a pleasure participating in the care of your patient, Mr. Mateo Goodman.  Please feel free to contact me anytime if you have any questions regarding his care in the future.         Sincerely,      ISAAC MARQUEZ MD             D: 2018   T: 2018   MT: OCTAVIA      Name:     MATEO GOODMAN   MRN:      [id number]        Account:      [id number]   :      1960      Document: [id number]

## 2019-01-07 ENCOUNTER — TELEPHONE (OUTPATIENT)
Dept: CARDIOLOGY | Facility: CLINIC | Age: 59
End: 2019-01-07

## 2019-01-07 DIAGNOSIS — I10 BENIGN ESSENTIAL HYPERTENSION: ICD-10-CM

## 2019-01-07 RX ORDER — FUROSEMIDE 20 MG
20 TABLET ORAL DAILY
Qty: 30 TABLET | Refills: 0 | Status: CANCELLED | OUTPATIENT
Start: 2019-01-07

## 2019-01-07 NOTE — TELEPHONE ENCOUNTER
[facility] Call Center    Phone Message    May a detailed message be left on voicemail: yes    Reason for Call: Medication Refill Request    Has the patient contacted the pharmacy for the refill? Yes   Name of medication being requested: furosemide (LASIX) 20 MG tablet  Provider who prescribed the medication: Gali Spring  [facility]  Date medication is needed: 01/17       PT is requesting that this medication be refilled for 90 days. He states that all of his other medications are 90-day refills, and he doesn't want to have to go pick this one up every 30 days.       Action Taken: Message routed to:  [facility] Cardiology

## 2019-01-14 NOTE — TELEPHONE ENCOUNTER
[facility]    Phone Message    May a detailed message be left on voicemail: yes    Reason for Call: Other: Pt called to follow up on his refill request for this medication. He said both he and the pharmacy have been trying to refill this for a couple weeks now, and he is very upset because he has not heard anything back. He is down to 3 pills left on this medicaiton, and would like a 90 day prescription sent to his pharmacy. He says that if something has changed and he's not supposed to take this medicaiton anymore, he would like a call from a nurse to discuss that.      Action Taken: Message routed to:  [facility] Cardiology

## 2019-01-14 NOTE — TELEPHONE ENCOUNTER
[facility]    Phone Message    May a detailed message be left on voicemail: yes    Reason for Call: Other: Pt called wanting to find out why his medication hasn't been refilled yet. He was very upset and would like to talk to someone today. Please give pt a call back to discuss.      Action Taken: Message routed to:  [facility] Cardiology

## 2019-01-15 DIAGNOSIS — I42.9 SECONDARY CARDIOMYOPATHY (H): ICD-10-CM

## 2019-01-15 DIAGNOSIS — I42.8 CARDIOMYOPATHY, NONISCHEMIC (H): ICD-10-CM

## 2019-01-15 DIAGNOSIS — I10 BENIGN ESSENTIAL HYPERTENSION: ICD-10-CM

## 2019-01-15 RX ORDER — FUROSEMIDE 20 MG
20 TABLET ORAL DAILY
Qty: 90 TABLET | Refills: 1 | Status: SHIPPED | OUTPATIENT
Start: 2019-01-15 | End: 2019-06-12

## 2019-01-15 RX ORDER — CARVEDILOL 12.5 MG/1
12.5 TABLET ORAL 2 TIMES DAILY WITH MEALS
Qty: 180 TABLET | Refills: 1 | Status: SHIPPED | OUTPATIENT
Start: 2019-01-15 | End: 2019-06-12

## 2019-01-15 RX ORDER — CARVEDILOL 3.12 MG/1
9.38 TABLET ORAL 2 TIMES DAILY WITH MEALS
Qty: 180 TABLET | Refills: 1 | OUTPATIENT
Start: 2019-01-15

## 2019-01-15 NOTE — TELEPHONE ENCOUNTER
Noted.  Refills for Carvedilol and Lasix sent 01/15/2019 to Pt preferred Pharmacy.  Pt notified.    Dede Amaya LPN

## 2019-03-11 ENCOUNTER — OFFICE VISIT (OUTPATIENT)
Dept: PSYCHIATRY | Facility: CLINIC | Age: 59
End: 2019-03-11
Attending: CLINICAL NURSE SPECIALIST
Payer: MEDICARE

## 2019-03-11 VITALS
SYSTOLIC BLOOD PRESSURE: 120 MMHG | BODY MASS INDEX: 27.34 KG/M2 | HEART RATE: 76 BPM | DIASTOLIC BLOOD PRESSURE: 77 MMHG | WEIGHT: 196 LBS

## 2019-03-11 DIAGNOSIS — F34.1 DYSTHYMIA: ICD-10-CM

## 2019-03-11 DIAGNOSIS — F41.1 ANXIETY STATE: Primary | ICD-10-CM

## 2019-03-11 PROCEDURE — G0463 HOSPITAL OUTPT CLINIC VISIT: HCPCS | Mod: ZF

## 2019-03-11 RX ORDER — DIAZEPAM 10 MG
10 TABLET ORAL 3 TIMES DAILY
Qty: 90 TABLET | Refills: 5 | Status: SHIPPED | OUTPATIENT
Start: 2019-03-11 | End: 2019-09-09

## 2019-03-11 ASSESSMENT — PATIENT HEALTH QUESTIONNAIRE - PHQ9: SUM OF ALL RESPONSES TO PHQ QUESTIONS 1-9: 1

## 2019-03-11 ASSESSMENT — PAIN SCALES - GENERAL: PAINLEVEL: NO PAIN (0)

## 2019-03-11 NOTE — PROGRESS NOTES
"  Outpatient Psychiatry Progress Note     Provider: BRENT Payne CNS  Date: 3/11/2019  Service:  Medication follow up with counseling.   Patient Identification: Lul Arriola  : 1960   MRN: [id number]    Lul Arriola is a 58 year old year old male who presents for ongoing psychiatric care.  Lul Arriola was last seen in clinic on 18.   At that time,   Assessment & Plan      Lul Arriola is seen today for follow up and reports his mood continues to be stable. He is followed by cardiology for CHF. He has noticed feeling a \"not himself\" with his heart meds.      Diagnosis       Encounter Diagnoses   Name Primary?     Anxiety state       Dysthymia Yes         Plan:  Medication: continue current medications   OTC Recommendations: none  Lab Orders:  none  Referrals: none  Release of Information: none  Future Treatment Considerations: per symptoms  Return for Follow Up: 6 months      ____________________________________________________________________________________________________________________________________________    2019  Today Lul reports his mood is as well as could be expected. States he can't function in the winter and just wants to stay warm. If he gets cold he has severe body tremors.  In the summer he sweats so bad sweat rolls in his eyes and he can't see.   His uncle passed away last a month, a few weeks after they got together for dinner with his uncle's daughter. Both his uncle and mother  from heart valve problems.  He picked up a cold he thinks at his uncles  and is still recovering.    Side effects of medication include: none  Psychiatric Review of Systems:  Depression: In the last 2 weeks per PHQ-9 score:   PHQ-9 SCORE 3/11/2019   PHQ-9 Total Score -   PHQ-9 Total Score 1       Anxiety : stable  [place] na   Psychosis  na.   ADHD na    Review of Medical Systems:  Sleep: no concerns  Energy:mild  Concentration: no " concerns  Appetite: stable  GI Concerns: none  Cardiac concerns: no new concerns  Neurological concerns: no new concerns - has hip and back pain  Other medical concerns: no new concerns  Current Substance Use:  Alcohol:denies use  Other drugs:denies  Caffeine:no change  Nicotine: denies  Past Medical History:   Past Medical History:   Diagnosis Date     Cardiomyopathy, nonischemic (H) 3/23/2018     Patient Active Problem List   Diagnosis     Anxiety state     Dysthymia     Cardiomyopathy, nonischemic (H)       Allergies:   Allergies   Allergen Reactions     Ultram [Tramadol] Nausea     Baclofen      Imitrex [Sumatriptan] Nausea     Ivp Dye [Contrast Dye] Nausea and Vomiting     N & V, eyes, nose, throat swelled as a child     Keflex [Cephalexin Hcl]      Remeron Soltab      Valtrex [Valacyclovir] Other (See Comments)     Stomach pains     Celebrex [Celecoxib] Palpitations          Current Medications     Current Outpatient Medications Ordered in Epic   Medication Sig Dispense Refill     ASPIRIN PO Take 81 mg by mouth daily       Aspirin-Acetaminophen-Caffeine (EXCEDRIN PO) Take by mouth as needed       butalbital-acetaminophen-caffeine (FIORICET/ESGIC) -40 MG tablet Take 1 tablet by mouth every 4 hours as needed for pain 15 tablet 5     carvedilol (COREG) 12.5 MG tablet Take 1 tablet (12.5 mg) by mouth 2 times daily (with meals) 180 tablet 1     cyclobenzaprine (FLEXERIL) 10 MG tablet Take 1 tablet (10 mg) by mouth 3 times daily as needed for muscle spasms 90 tablet 5     diazepam (VALIUM) 10 MG tablet Take 1 tablet (10 mg) by mouth 3 times daily 90 tablet 5     furosemide (LASIX) 20 MG tablet Take 1 tablet (20 mg) by mouth daily 90 tablet 1     ketoconazole (NIZORAL) 2 % external cream Apply topically 2 times daily 60 g 1     lisinopril (PRINIVIL/ZESTRIL) 5 MG tablet Take 1 tablet (5 mg) by mouth 2 times daily 180 tablet 3     MOTRIN IB PO Take by mouth as needed for moderate pain       potassium chloride  "(K-TAB,KLOR-CON) 10 MEQ tablet Take 1 tablet (10 mEq) by mouth 2 times daily 90 tablet 3     ranitidine (ZANTAC) 150 MG tablet Take 1 tablet (150 mg) by mouth 2 times daily 180 tablet 3     sildenafil (VIAGRA) 100 MG tablet Take 0.5-1 tablets ( mg) by mouth daily as needed Take 30 min to 4 hours before intercourse.  Never use with nitroglycerin, terazosin or doxazosin. 12 tablet 11     acyclovir (ZOVIRAX) 200 MG capsule Take one po tid (Patient not taking: Reported on 12/12/2018) 270 capsule 3     carvedilol (COREG) 3.125 MG tablet Take 3 tablets (9.375 mg) by mouth 2 times daily (with meals) (Patient not taking: Reported on 3/11/2019) 180 tablet 1     fluticasone (FLONASE) 50 MCG/ACT nasal spray Spray 2 sprays into both nostrils every 12 hours (Patient not taking: Reported on 12/12/2018) 16 g 5     No current Epic-ordered facility-administered medications on file.         Mental Status Exam     Appearance:  Casually dressed and Well groomed  Behavior/relationship to examiner/demeanor:  Cooperative  Orientation: Oriented to person, place, time and situation  Psychomotor: normal form  Speech Rate:  Normal  Speech Spontaneity:  Normal  Mood:  \"okay\"  Affect:  Appropriate/mood-congruent  Thought Process (Associations):  Goal directed  Thought Content:  no overt psychosis, denies suicidal ideation, intent or thoughts and patient does not appear to be responding to internal stimuli  Abnormal Perception:  None  Attention/Concentration:  Normal  Insight:  Adequate  Judgment:  Adequate for safety      Results     Vital signs: /77   Pulse 76   Wt 88.9 kg (196 lb)   BMI 27.34 kg/m      Laboratory Data:  no new data    Assessment & Plan      Lul Arriola is seen today for follow up and reports his mood has been stable and he would like to continue current dose of Valium    Diagnosis  Encounter Diagnoses   Name Primary?     Anxiety state Yes     Dysthymia        Plan:  Medication: Continue valium 10mg " tid  OTC Recommendations: none  Lab Orders:  none  Referrals: none  Release of Information: none  Future Treatment Considerations: per patient agreement to try other medication  Return for Follow Up:6 months   The risks, benefits, alternatives and side effects have been discussed and are understood by the patient. The patient understands the risks of using street drugs or alcohol. There are no medical contraindications, the patient agrees to treatment, and has the capacity to do so. The patient understands to call 911 or come to the nearest ED if life threatening or urgent symptoms present.  In addition time was spent counseling the patient and/or coordinating care regarding review of social and occupational functioning.  In addition patient was counseled on health and wellness practices to augment medication treatment of symptoms. See note for details.    Mary Jane Macario, APRN CNS 3/11/2019

## 2019-05-22 DIAGNOSIS — M54.2 NECK PAIN: ICD-10-CM

## 2019-05-22 DIAGNOSIS — G44.209 TENSION HEADACHE: ICD-10-CM

## 2019-05-22 NOTE — TELEPHONE ENCOUNTER
[facility]    Phone Message    May a detailed message be left on voicemail: yes    Reason for Call: Medication Refill Request    Has the patient contacted the pharmacy for the refill? Yes   Name of medication being requested: butalbital-acetaminophen-caffeine (FIORICET/ESGIC) -40 MG tablet     Provider who prescribed the medication: Donald Lorenz MD  Pharmacy: [facility] Address: [address], [place] [address] Phone: [phone]   Date medication is needed: asap   Please call patient once refill request has been sent thank you       Action Taken: Message routed to:  [facility] [place]

## 2019-05-23 RX ORDER — BUTALBITAL, ACETAMINOPHEN AND CAFFEINE 50; 325; 40 MG/1; MG/1; MG/1
1 TABLET ORAL EVERY 4 HOURS PRN
Qty: 15 TABLET | Refills: 5 | Status: SHIPPED | OUTPATIENT
Start: 2019-05-23 | End: 2019-11-04

## 2019-06-11 ENCOUNTER — NURSE TRIAGE (OUTPATIENT)
Dept: NURSING | Facility: CLINIC | Age: 59
End: 2019-06-11

## 2019-06-11 DIAGNOSIS — I10 BENIGN ESSENTIAL HYPERTENSION: ICD-10-CM

## 2019-06-11 NOTE — TELEPHONE ENCOUNTER
Lul reports he lost his Lasix pills and wants prescription refilled. Patient took his dose of Lasix today. He will need the prescription tomorrow to take his next dose tomorrow. FNA is unable to refill medication as it was prescribed by [facility] Card[facility] doctor. FNA advised patient to call his heart clinic tomorrow to ask for a refill. Caller verbalized understanding and had no further questions.     Meche Cedeño RN/[facility]    Reason for Disposition    Caller requesting a NON-URGENT new prescription or refill and triager unable to refill per unit policy    Protocols used: MEDICATION QUESTION CALL-A-

## 2019-06-12 ENCOUNTER — TELEPHONE (OUTPATIENT)
Dept: CARDIOLOGY | Facility: CLINIC | Age: 59
End: 2019-06-12

## 2019-06-12 DIAGNOSIS — I50.20 SYSTOLIC CONGESTIVE HEART FAILURE (H): ICD-10-CM

## 2019-06-12 DIAGNOSIS — I42.8 CARDIOMYOPATHY, NONISCHEMIC (H): ICD-10-CM

## 2019-06-12 DIAGNOSIS — I10 BENIGN ESSENTIAL HYPERTENSION: ICD-10-CM

## 2019-06-12 DIAGNOSIS — I42.9 SECONDARY CARDIOMYOPATHY (H): ICD-10-CM

## 2019-06-12 RX ORDER — FUROSEMIDE 20 MG
20 TABLET ORAL DAILY
Qty: 90 TABLET | Refills: 1 | Status: SHIPPED | OUTPATIENT
Start: 2019-06-12 | End: 2019-11-04

## 2019-06-12 RX ORDER — LISINOPRIL 5 MG/1
5 TABLET ORAL 2 TIMES DAILY
Qty: 180 TABLET | Refills: 1 | Status: SHIPPED | OUTPATIENT
Start: 2019-06-12 | End: 2019-11-04

## 2019-06-12 RX ORDER — CARVEDILOL 12.5 MG/1
12.5 TABLET ORAL 2 TIMES DAILY WITH MEALS
Qty: 180 TABLET | Refills: 1 | Status: SHIPPED | OUTPATIENT
Start: 2019-06-12 | End: 2019-11-04

## 2019-06-12 NOTE — TELEPHONE ENCOUNTER
[facility]    Phone Message    May a detailed message be left on voicemail: yes    Reason for Call: Other: Pt calling wanting to speak to the nurse about status of Rx. refill of furosemide (LASIX) 20 MG tablet. Please return call.      Action Taken: Message routed to:  [facility] mary cardio

## 2019-06-14 RX ORDER — FUROSEMIDE 20 MG
TABLET ORAL
Qty: 90 TABLET | Refills: 1 | OUTPATIENT
Start: 2019-06-14

## 2019-06-14 NOTE — TELEPHONE ENCOUNTER
Last entry from 06/12/2019.  Returned Pt call and informed his prescription was sent to pharmacy.      Dede Amaya LPN

## 2019-06-15 RX ORDER — CARVEDILOL 12.5 MG/1
TABLET ORAL
Qty: 180 TABLET | Refills: 1 | OUTPATIENT
Start: 2019-06-15

## 2019-09-09 ENCOUNTER — OFFICE VISIT (OUTPATIENT)
Dept: PSYCHIATRY | Facility: CLINIC | Age: 59
End: 2019-09-09
Attending: CLINICAL NURSE SPECIALIST
Payer: MEDICARE

## 2019-09-09 VITALS
HEART RATE: 76 BPM | SYSTOLIC BLOOD PRESSURE: 112 MMHG | WEIGHT: 190.6 LBS | BODY MASS INDEX: 26.58 KG/M2 | DIASTOLIC BLOOD PRESSURE: 74 MMHG

## 2019-09-09 DIAGNOSIS — F34.1 DYSTHYMIA: ICD-10-CM

## 2019-09-09 DIAGNOSIS — F41.1 ANXIETY STATE: Primary | ICD-10-CM

## 2019-09-09 DIAGNOSIS — G24.9 DYSTONIA, UNSPECIFIED: ICD-10-CM

## 2019-09-09 PROCEDURE — G0463 HOSPITAL OUTPT CLINIC VISIT: HCPCS | Mod: ZF

## 2019-09-09 RX ORDER — DIAZEPAM 10 MG
10 TABLET ORAL 3 TIMES DAILY
Qty: 90 TABLET | Refills: 5 | Status: SHIPPED | OUTPATIENT
Start: 2019-09-09 | End: 2020-03-09

## 2019-09-09 ASSESSMENT — PATIENT HEALTH QUESTIONNAIRE - PHQ9: SUM OF ALL RESPONSES TO PHQ QUESTIONS 1-9: 4

## 2019-09-09 ASSESSMENT — PAIN SCALES - GENERAL: PAINLEVEL: MODERATE PAIN (4)

## 2019-09-09 NOTE — PROGRESS NOTES
"  Outpatient Psychiatry Progress Note     Provider: BRENT Payne CNS  Date: 2019  Service:  Medication follow up with counseling.   Patient Identification: Lul Arriola  : 1960   MRN: [id number]    Lul Arriola is a 58 year old year old male who presents for ongoing psychiatric care.  Lul Arriola was last seen in clinic on 3/11/19.   At that time,   Assessment & Plan       Lul Arriola is seen today for follow up and reports his mood has been stable and he would like to continue current dose of Valium     Diagnosis       Encounter Diagnoses   Name Primary?     Anxiety state Yes     Dysthymia           Plan:  Medication: Continue valium 10mg tid  OTC Recommendations: none  Lab Orders:  none  Referrals: none  Release of Information: none  Future Treatment Considerations: per patient agreement to try other medication  Return for Follow Up:6 months      ____________________________________________________________________________________________________________________________________________    2019  Today Lul reports he has noticed the last few years he is slowing down. Mood has been \"not too bad\".  Not a lot going on which is is preference.  Health has been stable with continued difficulty with pain and fatigue.  Diazepam has been working the same and helpful.   All family is  except a brother that he has no contact with.  Has a good friend he has known since 3rd grade who helps him.  Gets together on Holidays with cousins. '  Moved in August so that he doesn't have yard work. Moved into his mother's house that he bought out from family.    Side effects of medication include: none  Psychiatric Review of Systems:  Depression: In the last 2 weeks per PHQ-9 score:   PHQ-9 SCORE 2019   PHQ-9 Total Score -   PHQ-9 Total Score 4       Anxiety : stable  [place] na   Psychosis  na.   ADHD na    Review of Medical Systems:  Sleep: stable  Energy: moderate " (stable)  Concentration: mild  Appetite: stable  GI Concerns: new concerns  Cardiac concerns: no new concerns  Neurological concerns: no new concerns  Other medical concerns: no new concerns  Current Substance Use:  Alcohol:denies  Other drugs:denies  Caffeine:not reviewed  Nicotine: none  Past Medical History:   Past Medical History:   Diagnosis Date     Cardiomyopathy, nonischemic (H) 3/23/2018     Patient Active Problem List   Diagnosis     Anxiety state     Dysthymia     Cardiomyopathy, nonischemic (H)       Allergies:   Allergies   Allergen Reactions     Ultram [Tramadol] Nausea     Baclofen      Imitrex [Sumatriptan] Nausea     Ivp Dye [Contrast Dye] Nausea and Vomiting     N & V, eyes, nose, throat swelled as a child     Keflex [Cephalexin Hcl]      Remeron Soltab      Valtrex [Valacyclovir] Other (See Comments)     Stomach pains     Celebrex [Celecoxib] Palpitations          Current Medications     Current Outpatient Medications Ordered in Epic   Medication Sig Dispense Refill     Aspirin-Acetaminophen-Caffeine (EXCEDRIN PO) Take by mouth as needed       butalbital-acetaminophen-caffeine (FIORICET/ESGIC) -40 MG tablet Take 1 tablet by mouth every 4 hours as needed for pain 15 tablet 5     carvedilol (COREG) 12.5 MG tablet Take 1 tablet (12.5 mg) by mouth 2 times daily (with meals) 180 tablet 1     cyclobenzaprine (FLEXERIL) 10 MG tablet Take 1 tablet (10 mg) by mouth 3 times daily as needed for muscle spasms 90 tablet 5     diazepam (VALIUM) 10 MG tablet Take 1 tablet (10 mg) by mouth 3 times daily 90 tablet 5     furosemide (LASIX) 20 MG tablet Take 1 tablet (20 mg) by mouth daily 90 tablet 1     ketoconazole (NIZORAL) 2 % external cream Apply topically 2 times daily 60 g 1     lisinopril (PRINIVIL/ZESTRIL) 5 MG tablet Take 1 tablet (5 mg) by mouth 2 times daily 180 tablet 1     potassium chloride (K-TAB,KLOR-CON) 10 MEQ tablet Take 1 tablet (10 mEq) by mouth 2 times daily 90 tablet 3     ranitidine  "(ZANTAC) 150 MG tablet Take 1 tablet (150 mg) by mouth 2 times daily 180 tablet 3     acyclovir (ZOVIRAX) 200 MG capsule Take one po tid (Patient not taking: Reported on 12/12/2018) 270 capsule 3     ASPIRIN PO Take 81 mg by mouth daily       carvedilol (COREG) 3.125 MG tablet Take 3 tablets (9.375 mg) by mouth 2 times daily (with meals) (Patient not taking: Reported on 9/9/2019) 180 tablet 1     fluticasone (FLONASE) 50 MCG/ACT nasal spray Spray 2 sprays into both nostrils every 12 hours (Patient not taking: Reported on 12/12/2018) 16 g 5     MOTRIN IB PO Take by mouth as needed for moderate pain       sildenafil (VIAGRA) 100 MG tablet Take 0.5-1 tablets ( mg) by mouth daily as needed Take 30 min to 4 hours before intercourse.  Never use with nitroglycerin, terazosin or doxazosin. (Patient not taking: Reported on 9/9/2019) 12 tablet 11     No current Epic-ordered facility-administered medications on file.         Mental Status Exam     Appearance:  Casually dressed and Well groomed  Behavior/relationship to examiner/demeanor:  Cooperative  Orientation: Oriented to person, place, time and situation  Psychomotor: normal form  Speech Rate:  Normal  Speech Spontaneity:  Normal  Mood:  \"pretty good\"  Affect:  Appropriate/mood-congruent  Thought Process (Associations):  Goal directed  Thought Content:  no overt psychosis, denies suicidal ideation, intent or thoughts and patient does not appear to be responding to internal stimuli  Abnormal Perception:  None  Attention/Concentration:  Normal  Insight:  Adequate  Judgment:  Good      Results     Vital signs: /74   Pulse 76   Wt 86.5 kg (190 lb 9.6 oz)   BMI 26.58 kg/m      Laboratory Data:  no new results    Assessment & Plan      Lul Arriola is seen today for follow up and reports his mood has been stable and would like to continue current medication. Reviewed risk of current medications and benefits. He agrees to continue at current dose with " appropriate caution and denies alcohol or other illicit drug use.     Diagnosis  Encounter Diagnoses   Name Primary?     Anxiety state Yes     Dysthymia        Plan:  Medication: no change  OTC Recommendations: none  Lab Orders:  none  Referrals: none  Release of Information: none  Future Treatment Considerations: patient does not want trials of other medication and agrees to appropriate, prescribed use of Valium.  Return for Follow Up:6 months.   The risks, benefits, alternatives and side effects have been discussed and are understood by the patient. The patient understands the risks of using street drugs or alcohol. There are no medical contraindications, the patient agrees to treatment, and has the capacity to do so. The patient understands to call 911 or come to the nearest ED if life threatening or urgent symptoms present.  In addition time was spent counseling the patient and/or coordinating care regarding review of social and occupational functioning.  In addition patient was counseled on health and wellness practices to augment medication treatment of symptoms. See note for details.    Mary Jane Macario, APRN CNS 9/9/2019

## 2019-11-04 ENCOUNTER — OFFICE VISIT (OUTPATIENT)
Dept: CARDIOLOGY | Facility: CLINIC | Age: 59
End: 2019-11-04
Attending: NURSE PRACTITIONER
Payer: MEDICARE

## 2019-11-04 ENCOUNTER — OFFICE VISIT (OUTPATIENT)
Dept: FAMILY MEDICINE | Facility: CLINIC | Age: 59
End: 2019-11-04
Payer: MEDICARE

## 2019-11-04 VITALS
DIASTOLIC BLOOD PRESSURE: 70 MMHG | WEIGHT: 191.2 LBS | SYSTOLIC BLOOD PRESSURE: 118 MMHG | HEART RATE: 69 BPM | BODY MASS INDEX: 26.67 KG/M2

## 2019-11-04 VITALS
HEIGHT: 71 IN | BODY MASS INDEX: 26.74 KG/M2 | DIASTOLIC BLOOD PRESSURE: 70 MMHG | OXYGEN SATURATION: 95 % | SYSTOLIC BLOOD PRESSURE: 118 MMHG | WEIGHT: 191 LBS | HEART RATE: 69 BPM

## 2019-11-04 DIAGNOSIS — Z12.5 ENCOUNTER FOR SCREENING FOR MALIGNANT NEOPLASM OF PROSTATE: ICD-10-CM

## 2019-11-04 DIAGNOSIS — G44.209 TENSION HEADACHE: ICD-10-CM

## 2019-11-04 DIAGNOSIS — Z00.00 HEALTH CARE MAINTENANCE: ICD-10-CM

## 2019-11-04 DIAGNOSIS — I42.8 CARDIOMYOPATHY, NONISCHEMIC (H): ICD-10-CM

## 2019-11-04 DIAGNOSIS — R35.0 URINE FREQUENCY: ICD-10-CM

## 2019-11-04 DIAGNOSIS — I42.8 CARDIOMYOPATHY, NONISCHEMIC (H): Primary | ICD-10-CM

## 2019-11-04 DIAGNOSIS — M54.2 NECK PAIN: ICD-10-CM

## 2019-11-04 DIAGNOSIS — L98.9 SKIN LESION: ICD-10-CM

## 2019-11-04 DIAGNOSIS — I50.22 CHRONIC SYSTOLIC CONGESTIVE HEART FAILURE (H): ICD-10-CM

## 2019-11-04 DIAGNOSIS — E78.00 HIGH CHOLESTEROL: ICD-10-CM

## 2019-11-04 DIAGNOSIS — B00.9 HSV (HERPES SIMPLEX VIRUS) INFECTION: ICD-10-CM

## 2019-11-04 DIAGNOSIS — J30.89 SEASONAL ALLERGIC RHINITIS DUE TO OTHER ALLERGIC TRIGGER: ICD-10-CM

## 2019-11-04 DIAGNOSIS — Z00.00 HEALTH CARE MAINTENANCE: Primary | ICD-10-CM

## 2019-11-04 DIAGNOSIS — I10 BENIGN ESSENTIAL HYPERTENSION: ICD-10-CM

## 2019-11-04 DIAGNOSIS — I42.9 SECONDARY CARDIOMYOPATHY (H): ICD-10-CM

## 2019-11-04 DIAGNOSIS — K21.9 GASTROESOPHAGEAL REFLUX DISEASE WITHOUT ESOPHAGITIS: ICD-10-CM

## 2019-11-04 DIAGNOSIS — N52.8 OTHER MALE ERECTILE DYSFUNCTION: ICD-10-CM

## 2019-11-04 LAB
ALBUMIN SERPL-MCNC: 3.7 G/DL (ref 3.4–5)
ALP SERPL-CCNC: 81 U/L (ref 40–150)
ALT SERPL W P-5'-P-CCNC: 24 U/L (ref 0–70)
ANION GAP SERPL CALCULATED.3IONS-SCNC: 5 MMOL/L (ref 3–14)
AST SERPL W P-5'-P-CCNC: 17 U/L (ref 0–45)
BILIRUB SERPL-MCNC: 0.4 MG/DL (ref 0.2–1.3)
BUN SERPL-MCNC: 19 MG/DL (ref 7–30)
CALCIUM SERPL-MCNC: 8.9 MG/DL (ref 8.5–10.1)
CHLORIDE SERPL-SCNC: 105 MMOL/L (ref 94–109)
CHOLEST SERPL-MCNC: 227 MG/DL
CO2 SERPL-SCNC: 26 MMOL/L (ref 20–32)
CREAT SERPL-MCNC: 0.96 MG/DL (ref 0.66–1.25)
GFR SERPL CREATININE-BSD FRML MDRD: 87 ML/MIN/{1.73_M2}
GLUCOSE SERPL-MCNC: 104 MG/DL (ref 70–99)
HDLC SERPL-MCNC: 39 MG/DL
LDLC SERPL CALC-MCNC: 114 MG/DL
NONHDLC SERPL-MCNC: 188 MG/DL
POTASSIUM SERPL-SCNC: 3.8 MMOL/L (ref 3.4–5.3)
PROT SERPL-MCNC: 7.6 G/DL (ref 6.8–8.8)
PSA SERPL-ACNC: 0.41 UG/L (ref 0–4)
SODIUM SERPL-SCNC: 136 MMOL/L (ref 133–144)
TRIGL SERPL-MCNC: 366 MG/DL

## 2019-11-04 PROCEDURE — G0463 HOSPITAL OUTPT CLINIC VISIT: HCPCS | Mod: 25,ZF

## 2019-11-04 PROCEDURE — 93010 ELECTROCARDIOGRAM REPORT: CPT | Mod: ZP | Performed by: INTERNAL MEDICINE

## 2019-11-04 PROCEDURE — 99213 OFFICE O/P EST LOW 20 MIN: CPT | Mod: 25 | Performed by: NURSE PRACTITIONER

## 2019-11-04 PROCEDURE — 93005 ELECTROCARDIOGRAM TRACING: CPT | Mod: ZF

## 2019-11-04 RX ORDER — SILDENAFIL 100 MG/1
50-100 TABLET, FILM COATED ORAL DAILY PRN
Qty: 12 TABLET | Refills: 11 | Status: SHIPPED | OUTPATIENT
Start: 2019-11-04 | End: 2020-09-11

## 2019-11-04 RX ORDER — CYCLOBENZAPRINE HCL 10 MG
10 TABLET ORAL 3 TIMES DAILY PRN
Qty: 90 TABLET | Refills: 5 | Status: SHIPPED | OUTPATIENT
Start: 2019-11-04 | End: 2020-09-11

## 2019-11-04 RX ORDER — FUROSEMIDE 20 MG
20 TABLET ORAL DAILY
Qty: 90 TABLET | Refills: 1 | Status: SHIPPED | OUTPATIENT
Start: 2019-11-04 | End: 2019-11-04

## 2019-11-04 RX ORDER — ACYCLOVIR 200 MG/1
CAPSULE ORAL
Qty: 270 CAPSULE | Refills: 3 | Status: SHIPPED | OUTPATIENT
Start: 2019-11-04 | End: 2020-09-11

## 2019-11-04 RX ORDER — CARVEDILOL 12.5 MG/1
12.5 TABLET ORAL 2 TIMES DAILY WITH MEALS
Qty: 180 TABLET | Refills: 1 | Status: SHIPPED | OUTPATIENT
Start: 2019-11-04 | End: 2019-11-04

## 2019-11-04 RX ORDER — FUROSEMIDE 20 MG
20 TABLET ORAL DAILY
Qty: 90 TABLET | Refills: 3 | Status: SHIPPED | OUTPATIENT
Start: 2019-11-04 | End: 2020-11-04

## 2019-11-04 RX ORDER — CARVEDILOL 12.5 MG/1
12.5 TABLET ORAL 2 TIMES DAILY WITH MEALS
Qty: 180 TABLET | Refills: 3 | Status: SHIPPED | OUTPATIENT
Start: 2019-11-04 | End: 2020-11-04 | Stop reason: ALTCHOICE

## 2019-11-04 RX ORDER — FLUTICASONE PROPIONATE 50 MCG
2 SPRAY, SUSPENSION (ML) NASAL EVERY 12 HOURS
Qty: 16 G | Refills: 5 | Status: SHIPPED | OUTPATIENT
Start: 2019-11-04 | End: 2020-07-13

## 2019-11-04 RX ORDER — LISINOPRIL 5 MG/1
5 TABLET ORAL 2 TIMES DAILY
Qty: 180 TABLET | Refills: 3 | Status: SHIPPED | OUTPATIENT
Start: 2019-11-04 | End: 2020-11-04

## 2019-11-04 RX ORDER — POTASSIUM CHLORIDE 750 MG/1
10 TABLET, EXTENDED RELEASE ORAL 2 TIMES DAILY
Qty: 90 TABLET | Refills: 3 | Status: SHIPPED | OUTPATIENT
Start: 2019-11-04 | End: 2019-11-04

## 2019-11-04 RX ORDER — FAMOTIDINE 40 MG/1
40 TABLET, FILM COATED ORAL DAILY
Qty: 90 TABLET | Refills: 3 | Status: SHIPPED | OUTPATIENT
Start: 2019-11-04 | End: 2020-09-11

## 2019-11-04 RX ORDER — BUTALBITAL, ACETAMINOPHEN AND CAFFEINE 50; 325; 40 MG/1; MG/1; MG/1
1 TABLET ORAL EVERY 4 HOURS PRN
Qty: 15 TABLET | Refills: 5 | Status: SHIPPED | OUTPATIENT
Start: 2019-11-04 | End: 2019-11-04

## 2019-11-04 RX ORDER — LISINOPRIL 5 MG/1
5 TABLET ORAL 2 TIMES DAILY
Qty: 180 TABLET | Refills: 1 | Status: SHIPPED | OUTPATIENT
Start: 2019-11-04 | End: 2019-11-04

## 2019-11-04 RX ORDER — POTASSIUM CHLORIDE 750 MG/1
10 TABLET, EXTENDED RELEASE ORAL DAILY
Qty: 90 TABLET | Refills: 3 | Status: SHIPPED | OUTPATIENT
Start: 2019-11-04 | End: 2020-10-29

## 2019-11-04 ASSESSMENT — PAIN SCALES - GENERAL
PAINLEVEL: NO PAIN (0)
PAINLEVEL: MILD PAIN (2)

## 2019-11-04 ASSESSMENT — MIFFLIN-ST. JEOR: SCORE: 1713.5

## 2019-11-04 NOTE — NURSING NOTE
Chief Complaint   Patient presents with     Follow Up     6 month follow up      Vitals were taken, medications reconciled and EKG performed.    Connor Boucher CMA    12:30 PM

## 2019-11-04 NOTE — PROGRESS NOTES
Heart Care - Clinical Cardiac Electrophysiology       HPI: Lul Arriola is a 58 year old male who presents for follow up of cardiomyopathy secondary to cancer treatment.  The patient has a past medical history significant for Hodgkin's Lymphoma (diagnosed 1993, s/p MOPP chemo and radiation, achieved remission), prior smoking (30 pack years, quit), anxiety, depression, and dilated cardiomyopathy.  He says he was informed about 20 years ago (after chemotherapy) that he had a weak heart function, however doesn't recall an EF number. He was doing fine until Feb 2016, when he was in [place] and suddenly started experiencing SOB, palpitations and diaphoresis. He went to a local hospital, was told his EF is about 20%, he didn't have an angiogram (and is not sure if he had any non invasive ischemia eval). He says he was told he shouldnt leave the hospital without a vest that would shock him if he were to go into an abnormal rhythm, however he left AMA. He has declined ICD or ischemic workup in the past. His primary cardiologist is Dr. Reynolds.    Reviewed current interval:  -- Echocardiogram 12/1/2017 shows LVEF 20-25%, no significant valvular pathologies, and severe diffuse hypokinesis  -- Presenting EKG personally reviewed tracings: NSR, LBBB, Vent rate 74, VA interval 168 ms, QRS duration 172 ms    Current Interval history:   Patient states that he has felt tired and short of breath for 30 years.  States that he rarely misses doses of medication. States that activity level is light, limited by dyspnea with exertion.  Sleeps with one pillow under head. No PND or orthopnea since 2/2016. States that he will note palpitations a few times a weeks that will last for seconds; unchanged for years. Strongly states  that he does not want any further cardiac workup or treatments at this time. Denies chest pain or pressure, dizziness, syncope, dyspnea at rest or with exertion, orthopnea, PND, ab, or claudication.  Denies easy  bruising or bleeding, hematuria, hematochezia, and epistaxis.    Current Outpatient Medications   Medication Sig Dispense Refill     acyclovir (ZOVIRAX) 200 MG capsule Take one po tid (Patient not taking: Reported on 12/12/2018) 270 capsule 3     ASPIRIN PO Take 81 mg by mouth daily       Aspirin-Acetaminophen-Caffeine (EXCEDRIN PO) Take by mouth as needed       butalbital-acetaminophen-caffeine (FIORICET/ESGIC) -40 MG tablet Take 1 tablet by mouth every 4 hours as needed for pain 15 tablet 5     carvedilol (COREG) 12.5 MG tablet Take 1 tablet (12.5 mg) by mouth 2 times daily (with meals) 180 tablet 1     carvedilol (COREG) 3.125 MG tablet Take 3 tablets (9.375 mg) by mouth 2 times daily (with meals) (Patient not taking: Reported on 9/9/2019) 180 tablet 1     cyclobenzaprine (FLEXERIL) 10 MG tablet Take 1 tablet (10 mg) by mouth 3 times daily as needed for muscle spasms 90 tablet 5     diazepam (VALIUM) 10 MG tablet Take 1 tablet (10 mg) by mouth 3 times daily 90 tablet 5     fluticasone (FLONASE) 50 MCG/ACT nasal spray Spray 2 sprays into both nostrils every 12 hours (Patient not taking: Reported on 12/12/2018) 16 g 5     furosemide (LASIX) 20 MG tablet Take 1 tablet (20 mg) by mouth daily 90 tablet 1     ketoconazole (NIZORAL) 2 % external cream Apply topically 2 times daily 60 g 1     lisinopril (PRINIVIL/ZESTRIL) 5 MG tablet Take 1 tablet (5 mg) by mouth 2 times daily 180 tablet 1     MOTRIN IB PO Take by mouth as needed for moderate pain       potassium chloride (K-TAB,KLOR-CON) 10 MEQ tablet Take 1 tablet (10 mEq) by mouth 2 times daily 90 tablet 3     ranitidine (ZANTAC) 150 MG tablet Take 1 tablet (150 mg) by mouth 2 times daily 180 tablet 3     sildenafil (VIAGRA) 100 MG tablet Take 0.5-1 tablets ( mg) by mouth daily as needed Take 30 min to 4 hours before intercourse.  Never use with nitroglycerin, terazosin or doxazosin. (Patient not taking: Reported on 9/9/2019) 12 tablet 11       Past  "Medical History:   Diagnosis Date     Cardiomyopathy, nonischemic (H) 3/23/2018       No past surgical history on file.    No family history on file.    Social History     Tobacco Use     Smoking status: Former Smoker     Packs/day: 0.50     Years: 30.00     Pack years: 15.00     Types: Cigarettes     Smokeless tobacco: Never Used   Substance Use Topics     Alcohol use: No     Alcohol/week: 0.0 standard drinks       Allergies   Allergen Reactions     Ultram [Tramadol] Nausea     Baclofen      Imitrex [Sumatriptan] Nausea     Ivp Dye [Contrast Dye] Nausea and Vomiting     N & V, eyes, nose, throat swelled as a child     Keflex [Cephalexin Hcl]      Remeron Soltab      Valtrex [Valacyclovir] Other (See Comments)     Stomach pains     Celebrex [Celecoxib] Palpitations     ROS:   A complete review of systems was performed and is negative except as noted in the HPI.     Physical Examination:  Vitals: /70 (BP Location: Right arm, Patient Position: Chair, Cuff Size: Adult Regular)   Pulse 69   Ht 1.803 m (5' 11\")   Wt 86.6 kg (191 lb)   SpO2 95%   BMI 26.64 kg/m    BMI= Body mass index is 26.64 kg/m .    GENERAL APPEARANCE: healthy, alert, and no acute distress  HEENT: no icterus, no xanthelasmas, normal pupil size and reaction, no cyanosis.  NECK: no asymmetry, no cervical bruits, no JVD   RESPIRATORY: lungs clear to auscultation - no rales, rhonchi or wheezes, no use of accessory muscles, no retractions, respirations are unlabored, normal respiratory rate  CARDIOVASCULAR: regular rhythm, normal S1 with physiologic split S2, no S3 or S4 and no murmur, click or rub  GI:  no abdominal bruits, soft, non-tender  EXTREMITIES: no clubbing  NEURO: alert and oriented to person/place/time, normal speech, gait and affect  VASC: Radial and posterior tibialis pulses +2 and symmetric bilaterally. No cyanosis or edema.   SKIN: no ecchymoses, no rashes    Assessment and recommendations:    # NICM: LVEF 20-25%, NYHA II, Fluid " status: euvolemic  1. ACEi/ARB: Continue lisinopril    2. BB: Continue coreg    3. Aldosterone antagonist: declines  4. NSAID us: Recommend avoidance of NSAIDS  5. SCD prophylaxis: Discussed risk of SCD with patient. He strongly declines ICD for primary prevention at this time  6. % BiV pacing: none  7. Recommend a low sodium diet (less than 2 grams/day)  8. Take daily weights and notify clinic if a weight gain of more than 3 lbs in a day or 5 lbs in a week.  9. Fluid management: Continue furosemide 20 mg daily. He wants to quit his furosemide. Discussed indication for lasix. He may trial 10 mg daily and if weight gain or breathing worsens go back up to 20 mg daily. He only takes his potassium once or twice weekly. BMP today shows K 3.8.  10. Declines echocardiogram   11. Declines coronary angiogram or CTA for ischemic workup due to his reduced LVEF and PERAZA; he has had vomiting with IV contrast in the past.     Follow up with Dr. Reynolds in one year or follow up sooner as needed for symptoms or problems.     Patient expresses understanding and agreement with the plan.    I appreciate the chance to help with Lul Arriola's care. Please let me know if you have any questions or concerns.    Aishwarya KENNEDY, CNP

## 2019-11-04 NOTE — NURSING NOTE
Chief Complaint   Patient presents with     Refill Request       Stephen Talbert CMA ([facility]) at 10:26 AM on 11/4/2019

## 2019-11-04 NOTE — LETTER
11/4/2019      RE: Lul Arriola  [address]  [facility] [address]       Dear Colleague,    Thank you for the opportunity to participate in the care of your patient, Lul Arriola, at the [facility] HEART [facility]. Please see a copy of my visit note below.    Heart Care - Clinical Cardiac Electrophysiology     HPI: Lul Arriola is a 58 year old male who presents for follow up of cardiomyopathy secondary to cancer treatment.  The patient has a past medical history significant for Hodgkin's Lymphoma (diagnosed 1993, s/p MOPP chemo and radiation, achieved remission), prior smoking (30 pack years, quit), anxiety, depression, and dilated cardiomyopathy.  He says he was informed about 20 years ago (after chemotherapy) that he had a weak heart function, however doesn't recall an EF number. He was doing fine until Feb 2016, when he was in [place] and suddenly started experiencing SOB, palpitations and diaphoresis. He went to a local hospital, was told his EF is about 20%, he didn't have an angiogram (and is not sure if he had any non invasive ischemia eval). He says he was told he shouldnt leave the hospital without a vest that would shock him if he were to go into an abnormal rhythm, however he left AMA. He has declined ICD or ischemic workup in the past. His primary cardiologist is Dr. Reynolds.    Reviewed current interval:  -- Echocardiogram 12/1/2017 shows LVEF 20-25%, no significant valvular pathologies, and severe diffuse hypokinesis  -- Presenting EKG personally reviewed tracings: NSR, LBBB, Vent rate 74, LA interval 168 ms, QRS duration 172 ms    Current Interval history:   Patient states that he has felt tired and short of breath for 30 years.  States that he rarely misses doses of medication. States that activity level is light, limited by dyspnea with exertion.  Sleeps with one pillow under head. No PND or orthopnea since 2/2016. States that he will  note palpitations a few times a weeks that will last for seconds; unchanged for years. Strongly states  that he does not want any further cardiac workup or treatments at this time. Denies chest pain or pressure, dizziness, syncope, dyspnea at rest or with exertion, orthopnea, PND, ab, or claudication.  Denies easy bruising or bleeding, hematuria, hematochezia, and epistaxis.    Current Outpatient Medications   Medication Sig Dispense Refill     acyclovir (ZOVIRAX) 200 MG capsule Take one po tid (Patient not taking: Reported on 12/12/2018) 270 capsule 3     ASPIRIN PO Take 81 mg by mouth daily       Aspirin-Acetaminophen-Caffeine (EXCEDRIN PO) Take by mouth as needed       butalbital-acetaminophen-caffeine (FIORICET/ESGIC) -40 MG tablet Take 1 tablet by mouth every 4 hours as needed for pain 15 tablet 5     carvedilol (COREG) 12.5 MG tablet Take 1 tablet (12.5 mg) by mouth 2 times daily (with meals) 180 tablet 1     carvedilol (COREG) 3.125 MG tablet Take 3 tablets (9.375 mg) by mouth 2 times daily (with meals) (Patient not taking: Reported on 9/9/2019) 180 tablet 1     cyclobenzaprine (FLEXERIL) 10 MG tablet Take 1 tablet (10 mg) by mouth 3 times daily as needed for muscle spasms 90 tablet 5     diazepam (VALIUM) 10 MG tablet Take 1 tablet (10 mg) by mouth 3 times daily 90 tablet 5     fluticasone (FLONASE) 50 MCG/ACT nasal spray Spray 2 sprays into both nostrils every 12 hours (Patient not taking: Reported on 12/12/2018) 16 g 5     furosemide (LASIX) 20 MG tablet Take 1 tablet (20 mg) by mouth daily 90 tablet 1     ketoconazole (NIZORAL) 2 % external cream Apply topically 2 times daily 60 g 1     lisinopril (PRINIVIL/ZESTRIL) 5 MG tablet Take 1 tablet (5 mg) by mouth 2 times daily 180 tablet 1     MOTRIN IB PO Take by mouth as needed for moderate pain       potassium chloride (K-TAB,KLOR-CON) 10 MEQ tablet Take 1 tablet (10 mEq) by mouth 2 times daily 90 tablet 3     ranitidine (ZANTAC) 150 MG tablet Take 1  "tablet (150 mg) by mouth 2 times daily 180 tablet 3     sildenafil (VIAGRA) 100 MG tablet Take 0.5-1 tablets ( mg) by mouth daily as needed Take 30 min to 4 hours before intercourse.  Never use with nitroglycerin, terazosin or doxazosin. (Patient not taking: Reported on 9/9/2019) 12 tablet 11       Past Medical History:   Diagnosis Date     Cardiomyopathy, nonischemic (H) 3/23/2018       No past surgical history on file.    No family history on file.    Social History     Tobacco Use     Smoking status: Former Smoker     Packs/day: 0.50     Years: 30.00     Pack years: 15.00     Types: Cigarettes     Smokeless tobacco: Never Used   Substance Use Topics     Alcohol use: No     Alcohol/week: 0.0 standard drinks       Allergies   Allergen Reactions     Ultram [Tramadol] Nausea     Baclofen      Imitrex [Sumatriptan] Nausea     Ivp Dye [Contrast Dye] Nausea and Vomiting     N & V, eyes, nose, throat swelled as a child     Keflex [Cephalexin Hcl]      Remeron Soltab      Valtrex [Valacyclovir] Other (See Comments)     Stomach pains     Celebrex [Celecoxib] Palpitations     ROS:   A complete review of systems was performed and is negative except as noted in the HPI.     Physical Examination:  Vitals: /70 (BP Location: Right arm, Patient Position: Chair, Cuff Size: Adult Regular)   Pulse 69   Ht 1.803 m (5' 11\")   Wt 86.6 kg (191 lb)   SpO2 95%   BMI 26.64 kg/m     BMI= Body mass index is 26.64 kg/m .    GENERAL APPEARANCE: healthy, alert, and no acute distress  HEENT: no icterus, no xanthelasmas, normal pupil size and reaction, no cyanosis.  NECK: no asymmetry, no cervical bruits, no JVD   RESPIRATORY: lungs clear to auscultation - no rales, rhonchi or wheezes, no use of accessory muscles, no retractions, respirations are unlabored, normal respiratory rate  CARDIOVASCULAR: regular rhythm, normal S1 with physiologic split S2, no S3 or S4 and no murmur, click or rub  GI:  no abdominal bruits, soft, " non-tender  EXTREMITIES: no clubbing  NEURO: alert and oriented to person/place/time, normal speech, gait and affect  VASC: Radial and posterior tibialis pulses +2 and symmetric bilaterally. No cyanosis or edema.   SKIN: no ecchymoses, no rashes    Assessment and recommendations:    # NICM: LVEF 20-25%, NYHA II, Fluid status: euvolemic  1. ACEi/ARB: Continue lisinopril    2. BB: Continue coreg    3. Aldosterone antagonist: declines  4. NSAID us: Recommend avoidance of NSAIDS  5. SCD prophylaxis: Discussed risk of SCD with patient. He strongly declines ICD for primary prevention at this time  6. % BiV pacing: none  7. Recommend a low sodium diet (less than 2 grams/day)  8. Take daily weights and notify clinic if a weight gain of more than 3 lbs in a day or 5 lbs in a week.  9. Fluid management: Continue furosemide 20 mg daily. He wants to quit his furosemide. Discussed indication for lasix. He may trial 10 mg daily and if weight gain or breathing worsens go back up to 20 mg daily. He only takes his potassium once or twice weekly. BMP today shows K 3.8.  10. Declines echocardiogram   11. Declines coronary angiogram or CTA for ischemic workup due to his reduced LVEF and PERAZA; he has had vomiting with IV contrast in the past.     Follow up with Dr. Reynolds in one year or follow up sooner as needed for symptoms or problems.     Patient expresses understanding and agreement with the plan.    I appreciate the chance to help with Lul Arriola's care. Please let me know if you have any questions or concerns.    Aishwarya KENNEDY, CNP

## 2019-11-04 NOTE — PROGRESS NOTES
"[facility]   Donald Lorenz MD  11/04/2019      Chief Complaint:   Refill Request     History of Present Illness:   Lul Arriola is a 58 year old male with a history of nonischemic cardiomyopathy secondary to chemotherapy for Hodgkin's, anxiety, and dysthymia who presents for medication recheck and evaluation of a derm problem. He prefers paper prescriptions.     Acid Reflux   He was previously on ranitidine, but stopped, due to recent news of carcinogens in the formula. He has been using Pepcid and [place] for the time being. He notes his heartburn is chronic and will occasionally get food stuck in his throat when swallowing. He manages this by drinking sips of water, but large sips of water causes him pain. He prefers this conservative management until he is unable to eat food.     Nonischemic Cardiomyopathy   He is seeing the nurse in cardiology today for refills. He has refused much of cardiology treatment and care offered in the past, per 's note in December 2018. He does not like taking the pills for blood pressure, as they make \"his eyes go funky.\"     Derm Problem   He would like skin tags frozen or removed today, as he keeps scratching them, which causes them to bleed.     Other concerns discussed:  1. Ears - His ears are itchy and he would like to have these looked at today.   2. Flu Shot - Deferred today.   3. Breathing - He notes his breathing is poor, but he is still able to get where he needs to go.   4. Joint Pain - Reports joint pain while getting to his visit from the parking lot.   5. Hay Fever - He has not had hay fever symptoms since having diazepam.      Review of Systems:   Pertinent items are noted in HPI or as in patient entered ROS below, remainder of complete ROS is negative.     Active Medications:      acyclovir (ZOVIRAX) 200 MG capsule, Take one po tid, Disp: 270 capsule, Rfl: 3     ASPIRIN PO, Take 81 mg by mouth daily, Disp: , Rfl:      " Aspirin-Acetaminophen-Caffeine (EXCEDRIN PO), Take by mouth as needed, Disp: , Rfl:      butalbital-acetaminophen-caffeine (FIORICET/ESGIC) -40 MG tablet, Take 1 tablet by mouth every 4 hours as needed for pain, Disp: 15 tablet, Rfl: 5     carvedilol (COREG) 12.5 MG tablet, Take 1 tablet (12.5 mg) by mouth 2 times daily (with meals), Disp: 180 tablet, Rfl: 1     cyclobenzaprine (FLEXERIL) 10 MG tablet, Take 1 tablet (10 mg) by mouth 3 times daily as needed for muscle spasms, Disp: 90 tablet, Rfl: 5     diazepam (VALIUM) 10 MG tablet, Take 1 tablet (10 mg) by mouth 3 times daily, Disp: 90 tablet, Rfl: 5     fluticasone (FLONASE) 50 MCG/ACT nasal spray, Spray 2 sprays into both nostrils every 12 hours, Disp: 16 g, Rfl: 5     furosemide (LASIX) 20 MG tablet, Take 1 tablet (20 mg) by mouth daily, Disp: 90 tablet, Rfl: 1     ketoconazole (NIZORAL) 2 % external cream, Apply topically 2 times daily, Disp: 60 g, Rfl: 1     lisinopril (PRINIVIL/ZESTRIL) 5 MG tablet, Take 1 tablet (5 mg) by mouth 2 times daily, Disp: 180 tablet, Rfl: 1     MOTRIN IB PO, Take by mouth as needed for moderate pain, Disp: , Rfl:      potassium chloride (K-TAB,KLOR-CON) 10 MEQ tablet, Take 1 tablet (10 mEq) by mouth 2 times daily, Disp: 90 tablet, Rfl: 3     ranitidine (ZANTAC) 150 MG tablet, Take 1 tablet (150 mg) by mouth 2 times daily, Disp: 180 tablet, Rfl: 3     sildenafil (VIAGRA) 100 MG tablet, Take 0.5-1 tablets ( mg) by mouth daily as needed Take 30 min to 4 hours before intercourse.  Never use with nitroglycerin, terazosin or doxazosin., Disp: 12 tablet, Rfl: 11    Allergies:   Ultram [tramadol]; Baclofen; Imitrex [sumatriptan]; Ivp dye [contrast dye]; Keflex [cephalexin hcl]; Remeron soltab; Valtrex [valacyclovir]; and Celebrex [celecoxib]      Past Medical History:  Nonischemic cardiomyopathy   Anxiety state   Dysthymia       Past Surgical History:  History reviewed. No pertinent past surgical history.     Family History:     History reviewed. No pertinent family history.       Social History:   The patient was alone.   Smoking Status: Former smoker, 0.50 PPD for 15 years   Smokeless Tobacco: Never used   Alcohol Use: No      Physical Exam:   /70 (BP Location: Right arm, Patient Position: Sitting, Cuff Size: Adult Regular)   Pulse 69   Wt 86.7 kg (191 lb 3.2 oz)   BMI 26.67 kg/m       Constitutional: Alert. In no distress.  Head: The scalp, face, and head appear normal.  Musculoskeletal: Extremities appear normal. No gross deformities noted.   Skin: left neck two 2 mm skin tags and one 4 mm skin tag, he has at the base of each neck on each side laterally, a 5 mm SK, which he requests to be frozen off   Neurologic: Gait normal. Speech is normal and fluent.  Psychiatric: Mentation appears normal. Normal affect.      Assessment and Plan:  Neck pain  He has used this chronically with good relief and does not wish to do anything else.   - butalbital-acetaminophen-caffeine (FIORICET/ESGIC) -40 MG tablet  Dispense: 15 tablet; Refill: 5  - cyclobenzaprine (FLEXERIL) 10 MG tablet  Dispense: 90 tablet; Refill: 5    Tension headache  - butalbital-acetaminophen-caffeine (FIORICET/ESGIC) -40 MG tablet  Dispense: 15 tablet; Refill: 5    Cardiomyopathy, nonischemic (H)  - Lipid panel reflex to direct LDL Fasting  - Comprehensive metabolic panel    Secondary cardiomyopathy (H)    HSV (herpes simplex virus) infection  This works well.   - acyclovir (ZOVIRAX) 200 MG capsule  Dispense: 270 capsule; Refill: 3    Seasonal allergic rhinitis due to other allergic trigger  - fluticasone (FLONASE) 50 MCG/ACT nasal spray  Dispense: 16 g; Refill: 5    Other male erectile dysfunction  - sildenafil (VIAGRA) 100 MG tablet  Dispense: 12 tablet; Refill: 11    Health care maintenance  - PSA screen    High cholesterol  - Lipid panel reflex to direct LDL Fasting  - Comprehensive metabolic panel    Urine frequency  - PSA screen    Encounter for  screening for malignant neoplasm of prostate   - PSA screen    Gastroesophageal reflux disease without esophagitis  He does occasionally have food stick. I offered endoscopy, and he knows there could be esophageal damage, but he declined.    - famotidine (PEPCID) 40 MG tablet  Dispense: 90 tablet; Refill: 3     I froze 5 skin lesions today that had a clinical benign appearance. He is aware that this can cause blistering or may not remove the lesion.     Follow-up: PRN        Scribe Disclosure:  I, Shelbi Ramirez, am serving as a scribe to document services personally performed by Donald Lorenz MD at this visit, based upon the provider's statements to me. All documentation has been reviewed by the aforementioned provider prior to being entered into the official medical record.     Portions of this medical record were completed by a scribe. UPON MY REVIEW AND AUTHENTICATION BY ELECTRONIC SIGNATURE, this confirms (a) I performed the applicable clinical services, and (b) the record is accurate.   Donald Lorenz MD MD

## 2019-11-05 LAB — INTERPRETATION ECG - MUSE: NORMAL

## 2019-11-13 NOTE — TELEPHONE ENCOUNTER
FUTURE VISIT INFORMATION      FUTURE VISIT INFORMATION:    Date:  11.27.19    Time: 1:00    Location:  Allergy  REFERRAL INFORMATION:    Referring provider:  Aishwarya Wiggins    Referring providers clinic:  Cardiology    Reason for visit/diagnosis Test for Contrast Allergy to IVP dye, Pt had episode when younger, per Pt    RECORDS REQUESTED FROM:       Clinic name Comments Records Status Imaging Status   Cardiology 11.4.19 Aishwarya Wiggins [facility] N/A

## 2019-11-27 ENCOUNTER — PRE VISIT (OUTPATIENT)
Dept: ALLERGY | Facility: CLINIC | Age: 59
End: 2019-11-27

## 2020-01-15 ENCOUNTER — OFFICE VISIT (OUTPATIENT)
Dept: ALLERGY | Facility: CLINIC | Age: 60
End: 2020-01-15
Attending: INTERNAL MEDICINE
Payer: MEDICARE

## 2020-01-15 DIAGNOSIS — Z88.9 DRUG ALLERGY: Primary | ICD-10-CM

## 2020-01-15 ASSESSMENT — PAIN SCALES - GENERAL: PAINLEVEL: NO PAIN (0)

## 2020-01-15 NOTE — NURSING NOTE
Chief Complaint   Patient presents with     Allergies     as a child had what sounded like anaphylaxis to IV dye. States he wants to know now and so does PCP due to current heart issues.      Judy Ames LPN

## 2020-01-15 NOTE — LETTER
1/15/2020         RE: Lul Arriola  [address]  [facility] [address]        Dear Colleague,    Thank you for referring your patient, Lul Arriola, to the [facility] ALLERGY. Please see a copy of my visit note below.    [facility] Allergy Note      Allergy Problem List:    Specialty Problems     None          CC:   Allergies (as a child had what sounded like anaphylaxis to IVP dye. States he wants to know now and so does PCP due to current heart issues. )    Patient had at age of 13 an exam of the kidney with contrast media and within minutes generalized swellings, angioedema, Urticaria, breathing problems and vomiting.  Since then no contrast media used.    Since Teenager years in August/September RC (no asthma) to Ragweed, and every morning stuffy/runny nose (house dust mites) and after touching cat conjunctivitis      Patient on LIsinopril, Lasix, ASpirin, Carvedilol    Encounter Date: Kermit 15, 2020    History of Present Illness:  Mr. Lul Arriola is a 59 year old male who presents as a referral from Self.    Past Medical History:   Patient Active Problem List   Diagnosis     Anxiety state     Dysthymia     Cardiomyopathy, nonischemic (H)     Past Medical History:   Diagnosis Date     Cardiomyopathy, nonischemic (H) 3/23/2018       Allergy History:     Allergies   Allergen Reactions     Ultram [Tramadol] Nausea     Baclofen      Imitrex [Sumatriptan] Nausea     Ivp Dye [Contrast Dye] Nausea and Vomiting     N & V, eyes, nose, throat swelled as a child     Keflex [Cephalexin Hcl]      Remeron Soltab      Valtrex [Valacyclovir] Other (See Comments)     Stomach pains     Celebrex [Celecoxib] Palpitations       Social History:     reports that he has quit smoking. His smoking use included cigarettes. He has a 15.00 pack-year smoking history. He has never used smokeless tobacco. He reports that he does not drink alcohol or use drugs.      Family History:  No family history  on file.    Medications:  Current Outpatient Medications   Medication Sig Dispense Refill     acyclovir (ZOVIRAX) 200 MG capsule Take one po tid 270 capsule 3     ASPIRIN PO Take 81 mg by mouth daily       Aspirin-Acetaminophen-Caffeine (EXCEDRIN PO) Take by mouth as needed       carvedilol (COREG) 12.5 MG tablet Take 1 tablet (12.5 mg) by mouth 2 times daily (with meals) 180 tablet 3     cyclobenzaprine (FLEXERIL) 10 MG tablet Take 1 tablet (10 mg) by mouth 3 times daily as needed for muscle spasms 90 tablet 5     diazepam (VALIUM) 10 MG tablet Take 1 tablet (10 mg) by mouth 3 times daily 90 tablet 5     famotidine (PEPCID) 40 MG tablet Take 1 tablet (40 mg) by mouth daily 90 tablet 3     fluticasone (FLONASE) 50 MCG/ACT nasal spray Spray 2 sprays into both nostrils every 12 hours 16 g 5     furosemide (LASIX) 20 MG tablet Take 1 tablet (20 mg) by mouth daily 90 tablet 3     ketoconazole (NIZORAL) 2 % external cream Apply topically 2 times daily 60 g 1     lisinopril (PRINIVIL/ZESTRIL) 5 MG tablet Take 1 tablet (5 mg) by mouth 2 times daily 180 tablet 3     MOTRIN IB PO Take by mouth as needed for moderate pain       potassium chloride ER (K-TAB/KLOR-CON) 10 MEQ CR tablet Take 1 tablet (10 mEq) by mouth daily 90 tablet 3     sildenafil (VIAGRA) 100 MG tablet Take 0.5-1 tablets ( mg) by mouth daily as needed Take 30 min to 4 hours before intercourse.  Never use with nitroglycerin, terazosin or doxazosin. 12 tablet 11       Review of Systems:  -As per HPI  -Constitutional: The patient denies fatigue, fevers, chills, unintended weight loss, and night sweats.  -HEENT: Patient denies nonhealing oral sores.  -Skin: As above in HPI. No additional skin concerns.    Physical exam:  Vitals: There were no vitals taken for this visit.  Not relevant    -No other lesions of concern on areas examined.     Allergy Tests:    DRUG ALLERGY TEST SERIES     CONTRAST MEDIA (iodinated, CT) --> Placed 01/15/20    No Substance  Readings (15min) Evaluation   POS Histamine 1mg/ml +    NEG NaCl 0.9% -       Prick Tests         Substance/ Allergen Concentration Result (15min) Remarks    Iodixanol [Visipaque] (*NID) 320/50 ml -     Iohexol [Omnipaque] (*NIM) 350 mg I/ml -      Intradermal Testing (Placed 01/15/20)    No Substance Conc.  Readings (15min) Evaluation   1 NaCl  0.9% -    2 Histamine  0.1mg / ml ++ 10mmP/15mmE      Intradermal Tests   immediate immediate delayed delayed      Substance Conc 1st dil (15 ) 2nd dil (15 )   days   days remarks    Iodixanol (*NID) 1:10 neg        Iohexol (*NIM) 1:10 neg       * IM = Ionic monomer  * NIM = Non-ionic monomer   * ID = Ionic dimer  * NID = Non-ionic dimer      DRUG ALLERGY TEST SERIES     CONTRAST MEDIA (paramagnetic / Sonography)      Prick Tests         Substance/ Allergen Conc Result (15min) Remarks    Gadopentetic acid - Magnevist (GK, Z) 10 ml -       Intradermal Tests   immed immed delayed delayed      Substance Conc 1st dil 2nd dil  days  days remarks    Gadopentetic acid 1:10 neg           Impression/Plan:    1) Immediate type reactions with Angioedema,Urticaria and vomiting to radio contrast media 45 years ago      In prick and intradermal tests to CT contrast Iodixanol (Visipaque) and Iohexol (Omnipaque) no signs for immediate type sensitization    In prick and intradermal test to MRI contrast media Gadopentate (Magnevist no signs for immediate type sensitization    ==> skin tests cannot exclude to 100% a specific immediate type/intolerance reaction to contrast media. Therefore I would propose following procedure if patient needs CT scan with contrast media    Premedication about 3 days prior with systemic Prednisone and antihistamines (e.g. 50mg Prednisone daily and Allegra 180mg 2times daily)     Be prepared to treat immediate type reaction during injection of CT contrast media with I.v. access.  --> Maybe use as alternative, is possible MRI with Gadopentate contrast      2)   atopic predisposition with allergic rhinoconjunctivitis     Perennial to house dust mites and cat    Seasonal in August/september to ragweed  --> prick tests maybe later, but today was priority on contrast media allergy    I spent a total of 38 minutes face to face with Lul Arriola during today s office visit. Over 50% of this time was spent counseling the patient and/or coordinating care.  Please see Assessment and Plan for details.This excludes any time spent performing prick and intradermal tests    DRUG ALLERGY TEST SERIES     CONTRAST MEDIA (iodinated, CT)     Prick Tests         Substance/ Allergen Concentration Result (15min) Remarks    Iodixanol [Visipaque] (*NID) 320/50 ml      Iohexol [Omnipaque] (*NIM) 350 mg I/ml        Intradermal Tests   immediate immediate delayed delayed      Substance Conc 1st dil (15 ) 2nd dil (15 )   days   days remarks    Iodixanol (*NID) 1:10         Iohexol (*NIM) 1:10            Patch Tests   as is  as is 1:3 paraffin 1:3 paraffin       Substance Conc   days   days   days   days remarks     Iodixanol (*NID) 320/50 ml          Iohexol (*NIM) 350 mg I/ml            * IM = Ionic monomer  * NIM = Non-ionic monomer   * ID = Ionic dimer  * NID = Non-ionic dimer      DRUG ALLERGY TEST SERIES     CONTRAST MEDIA (paramagnetic / Sonography)      Prick Tests         Substance/ Allergen Conc Result (15min) Remarks    Gadopentetic acid*** - Magnevist (GK, Z) 10 ml        Intradermal Tests   immed immed delayed delayed      Substance Conc 1st dil 2nd dil  days  days remarks    Gadopentetic acid*** 1:10            Patch Tests   as is  as is 1:2 paraffin 1:2 paraffin       Substance Conc  days  days  days  days remarks     Gadopentetic acid*** 10 ml          *  Cyclic   **    Linear ionic  ***   Linear non-ionic     a) Fe-oxide   b) Manganese   c) contains Macrogo      GK=whole body  A =   Angiography  Z =   CNS     L =    Liver          Again, thank you for allowing me to participate  in the care of your patient.        Sincerely,        Blayne Alvarez MD

## 2020-01-15 NOTE — PROGRESS NOTES
[facility] Allergy Note      Allergy Problem List:    Specialty Problems     None          CC:   Allergies (as a child had what sounded like anaphylaxis to IVP dye. States he wants to know now and so does PCP due to current heart issues. )    Patient had at age of 13 an exam of the kidney with contrast media and within minutes generalized swellings, angioedema, Urticaria, breathing problems and vomiting.  Since then no contrast media used.    Since Teenager years in August/September [place] (no asthma) to Ragweed, and every morning stuffy/runny nose (house dust mites) and after touching cat conjunctivitis      Patient on LIsinopril, Lasix, ASpirin, Carvedilol    Encounter Date: Kermit 15, 2020    History of Present Illness:  Mr. Lul Arriola is a 59 year old male who presents as a referral from Self.    Past Medical History:   Patient Active Problem List   Diagnosis     Anxiety state     Dysthymia     Cardiomyopathy, nonischemic (H)     Past Medical History:   Diagnosis Date     Cardiomyopathy, nonischemic (H) 3/23/2018       Allergy History:     Allergies   Allergen Reactions     Ultram [Tramadol] Nausea     Baclofen      Imitrex [Sumatriptan] Nausea     Ivp Dye [Contrast Dye] Nausea and Vomiting     N & V, eyes, nose, throat swelled as a child     Keflex [Cephalexin Hcl]      Remeron Soltab      Valtrex [Valacyclovir] Other (See Comments)     Stomach pains     Celebrex [Celecoxib] Palpitations       Social History:     reports that he has quit smoking. His smoking use included cigarettes. He has a 15.00 pack-year smoking history. He has never used smokeless tobacco. He reports that he does not drink alcohol or use drugs.      Family History:  No family history on file.    Medications:  Current Outpatient Medications   Medication Sig Dispense Refill     acyclovir (ZOVIRAX) 200 MG capsule Take one po tid 270 capsule 3     ASPIRIN PO Take 81 mg by mouth daily       Aspirin-Acetaminophen-Caffeine  (EXCEDRIN PO) Take by mouth as needed       carvedilol (COREG) 12.5 MG tablet Take 1 tablet (12.5 mg) by mouth 2 times daily (with meals) 180 tablet 3     cyclobenzaprine (FLEXERIL) 10 MG tablet Take 1 tablet (10 mg) by mouth 3 times daily as needed for muscle spasms 90 tablet 5     diazepam (VALIUM) 10 MG tablet Take 1 tablet (10 mg) by mouth 3 times daily 90 tablet 5     famotidine (PEPCID) 40 MG tablet Take 1 tablet (40 mg) by mouth daily 90 tablet 3     fluticasone (FLONASE) 50 MCG/ACT nasal spray Spray 2 sprays into both nostrils every 12 hours 16 g 5     furosemide (LASIX) 20 MG tablet Take 1 tablet (20 mg) by mouth daily 90 tablet 3     ketoconazole (NIZORAL) 2 % external cream Apply topically 2 times daily 60 g 1     lisinopril (PRINIVIL/ZESTRIL) 5 MG tablet Take 1 tablet (5 mg) by mouth 2 times daily 180 tablet 3     MOTRIN IB PO Take by mouth as needed for moderate pain       potassium chloride ER (K-TAB/KLOR-CON) 10 MEQ CR tablet Take 1 tablet (10 mEq) by mouth daily 90 tablet 3     sildenafil (VIAGRA) 100 MG tablet Take 0.5-1 tablets ( mg) by mouth daily as needed Take 30 min to 4 hours before intercourse.  Never use with nitroglycerin, terazosin or doxazosin. 12 tablet 11       Review of Systems:  -As per HPI  -Constitutional: The patient denies fatigue, fevers, chills, unintended weight loss, and night sweats.  -HEENT: Patient denies nonhealing oral sores.  -Skin: As above in HPI. No additional skin concerns.    Physical exam:  Vitals: There were no vitals taken for this visit.  Not relevant    -No other lesions of concern on areas examined.     Allergy Tests:    DRUG ALLERGY TEST SERIES     CONTRAST MEDIA (iodinated, CT) --> Placed 01/15/20    No Substance Readings (15min) Evaluation   POS Histamine 1mg/ml +    NEG NaCl 0.9% -       Prick Tests         Substance/ Allergen Concentration Result (15min) Remarks    Iodixanol [Visipaque] (*NID) 320/50 ml -     Iohexol [Omnipaque] (*NIM) 350 mg I/ml -       Intradermal Testing (Placed 01/15/20)    No Substance Conc.  Readings (15min) Evaluation   1 NaCl  0.9% -    2 Histamine  0.1mg / ml ++ 10mmP/15mmE      Intradermal Tests   immediate immediate delayed delayed      Substance Conc 1st dil (15 ) 2nd dil (15 )   days   days remarks    Iodixanol (*NID) 1:10 neg        Iohexol (*NIM) 1:10 neg       * IM = Ionic monomer  * NIM = Non-ionic monomer   * ID = Ionic dimer  * NID = Non-ionic dimer      DRUG ALLERGY TEST SERIES     CONTRAST MEDIA (paramagnetic / Sonography)      Prick Tests         Substance/ Allergen Conc Result (15min) Remarks    Gadopentetic acid - Magnevist (GK, Z) 10 ml -       Intradermal Tests   immed immed delayed delayed      Substance Conc 1st dil 2nd dil  days  days remarks    Gadopentetic acid 1:10 neg           Impression/Plan:    1) Immediate type reactions with Angioedema,Urticaria and vomiting to radio contrast media 45 years ago      In prick and intradermal tests to CT contrast Iodixanol (Visipaque) and Iohexol (Omnipaque) no signs for immediate type sensitization    In prick and intradermal test to MRI contrast media Gadopentate (Magnevist no signs for immediate type sensitization    ==> skin tests cannot exclude to 100% a specific immediate type/intolerance reaction to contrast media. Therefore I would propose following procedure if patient needs CT scan with contrast media    Premedication about 3 days prior with systemic Prednisone and antihistamines (e.g. 50mg Prednisone daily and Allegra 180mg 2times daily)     Be prepared to treat immediate type reaction during injection of CT contrast media with I.v. access.  --> Maybe use as alternative, is possible MRI with Gadopentate contrast      2)  atopic predisposition with allergic rhinoconjunctivitis     Perennial to house dust mites and cat    Seasonal in August/september to ragweed  --> prick tests maybe later, but today was priority on contrast media allergy    I spent a total of 38  minutes face to face with Lul Arriola during today s office visit. Over 50% of this time was spent counseling the patient and/or coordinating care.  Please see Assessment and Plan for details.This excludes any time spent performing prick and intradermal tests

## 2020-01-15 NOTE — PROGRESS NOTES
DRUG ALLERGY TEST SERIES     CONTRAST MEDIA (iodinated, CT)     Prick Tests         Substance/ Allergen Concentration Result (15min) Remarks    Iodixanol [Visipaque] (*NID) 320/50 ml      Iohexol [Omnipaque] (*NIM) 350 mg I/ml        Intradermal Tests   immediate immediate delayed delayed      Substance Conc 1st dil (15 ) 2nd dil (15 )   days   days remarks    Iodixanol (*NID) 1:10         Iohexol (*NIM) 1:10            Patch Tests   as is  as is 1:3 paraffin 1:3 paraffin       Substance Conc   days   days   days   days remarks     Iodixanol (*NID) 320/50 ml          Iohexol (*NIM) 350 mg I/ml            * IM = Ionic monomer  * NIM = Non-ionic monomer   * ID = Ionic dimer  * NID = Non-ionic dimer      DRUG ALLERGY TEST SERIES     CONTRAST MEDIA (paramagnetic / Sonography)      Prick Tests         Substance/ Allergen Conc Result (15min) Remarks    Gadopentetic acid - Magnevist (GK, Z) 10 ml        Intradermal Tests   immed immed delayed delayed      Substance Conc 1st dil 2nd dil  days  days remarks    Gadopentetic acid 1:10            Patch Tests   as is  as is 1:2 paraffin 1:2 paraffin       Substance Conc  days  days  days  days remarks     Gadopentetic acid 10 ml

## 2020-01-17 NOTE — PROGRESS NOTES
Drug Administration Record  Prior to injection, verified patient identity using patient's name and date of birth.  Due to injection administration, patient instructed to remain in clinic for 15 minutes  afterwards, and to report any adverse reaction to me immediately.  Drug Name: Iohexol 300  Dose: 1.5 ml  Route administered: Patch, prick, ID  NDC #: 0407-1413-10  Amount of waste(mL):8.5ml  Reason for waste: Single use vial  LOT #: [id number]  SITE: arms  : [employer]  EXPIRATION DATE: 03/05/2022    Drug Administration Record  Prior to injection, verified patient identity using patient's name and date of birth.  Due to injection administration, patient instructed to remain in clinic for 15 minutes  afterwards, and to report any adverse reaction to me immediately.  Drug Name: Magnevist  Dose: 1.5ml  Route administered: Patch, prick, ID  NDC #: 05628-646-24  Amount of waste(mL):3.5ml  Reason for waste: Single use vial  LOT #: [id number]  SITE: Arms  : [employer]  EXPIRATION DATE: 01/22    Drug Administration Record  Prior to injection, verified patient identity using patient's name and date of birth.  Due to injection administration, patient instructed to remain in clinic for 15 minutes  afterwards, and to report any adverse reaction to me immediately.  Drug Name: Visapaque  Dose: 1.5ml  Route administered: Patch, prick, ID  NDC #: 3435-1182-06  Amount of waste(mL):48.5ml  Reason for waste: Single use vial  LOT #: [id number]  SITE: Arms  : [employer]  EXPIRATION DATE: 03/12/2022

## 2020-03-09 ENCOUNTER — OFFICE VISIT (OUTPATIENT)
Dept: PSYCHIATRY | Facility: CLINIC | Age: 60
End: 2020-03-09
Attending: CLINICAL NURSE SPECIALIST
Payer: MEDICARE

## 2020-03-09 VITALS
WEIGHT: 198.2 LBS | BODY MASS INDEX: 27.64 KG/M2 | DIASTOLIC BLOOD PRESSURE: 74 MMHG | SYSTOLIC BLOOD PRESSURE: 116 MMHG | HEART RATE: 76 BPM

## 2020-03-09 DIAGNOSIS — F41.1 ANXIETY STATE: ICD-10-CM

## 2020-03-09 DIAGNOSIS — F34.1 DYSTHYMIA: Primary | ICD-10-CM

## 2020-03-09 PROCEDURE — G0463 HOSPITAL OUTPT CLINIC VISIT: HCPCS | Mod: ZF

## 2020-03-09 RX ORDER — DIAZEPAM 10 MG
10 TABLET ORAL 3 TIMES DAILY
Qty: 90 TABLET | Refills: 5 | Status: SHIPPED | OUTPATIENT
Start: 2020-03-09 | End: 2020-09-11

## 2020-03-09 ASSESSMENT — PAIN SCALES - GENERAL: PAINLEVEL: MILD PAIN (3)

## 2020-03-09 NOTE — PROGRESS NOTES
"  Outpatient Psychiatry Progress Note     Provider: BRENT Payne CNS  Date: 3/9/2020  Service:  Medication follow up with counseling.   Patient Identification: Lul Arriola  : 1960   MRN: [id number]    Lul Arriola is a 59 year old year old male who presents for ongoing psychiatric care.  Lul Arriola was last seen in clinic on 19.   At that time,   Assessment & Plan       Lul Arriola is seen today for follow up and reports his mood has been stable and would like to continue current medication. Reviewed risk of current medications and benefits. He agrees to continue at current dose with appropriate caution and denies alcohol or other illicit drug use.      Diagnosis       Encounter Diagnoses   Name Primary?     Anxiety state Yes     Dysthymia           Plan:  Medication: no change  OTC Recommendations: none  Lab Orders:  none  Referrals: none  Release of Information: none  Future Treatment Considerations: patient does not want trials of other medication and agrees to appropriate, prescribed use of Valium.  Return for Follow Up:6 months      ____________________________________________________________________________________________________________________________________________    2020  Today Lul reports he has been having back pain lately. He thinks it is due to trying to use a memory foam pad on his bed.  Continues to take it \"day by day\".  No new things in his life.  Cardiac has been stable.    Side effects of medication include: none  Psychiatric Review of Systems:  Depression: In the last 2 weeks per PHQ-9 score:   PHQ-9 SCORE 2018 3/11/2019 2019   PHQ-9 Total Score - - -   PHQ-9 Total Score 2 1 4        Anxiety : stable  [place] na   Psychosis  na.   ADHD na    Review of Medical Systems:  Sleep: stable over all  Energy: no change - continues low  Concentration: stable  Appetite: stable  GI Concerns: none  Cardiac concerns: no new " concerns  Neurological concerns: back pain  Other medical concerns: no new concerns  Current Substance Use:  Alcohol:denies  Other drugs:denies  Caffeine:not reviewed  Nicotine: vaping and this is less nicotine  Past Medical History:   Past Medical History:   Diagnosis Date     Cardiomyopathy, nonischemic (H) 3/23/2018     Patient Active Problem List   Diagnosis     Anxiety state     Dysthymia     Cardiomyopathy, nonischemic (H)       Allergies:   Allergies   Allergen Reactions     Ultram [Tramadol] Nausea     Baclofen      Imitrex [Sumatriptan] Nausea     Ivp Dye [Contrast Dye] Nausea and Vomiting     N & V, eyes, nose, throat swelled as a child     Keflex [Cephalexin Hcl]      Remeron Soltab      Valtrex [Valacyclovir] Other (See Comments)     Stomach pains     Celebrex [Celecoxib] Palpitations          Current Medications     Current Outpatient Medications Ordered in Epic   Medication Sig Dispense Refill     acyclovir (ZOVIRAX) 200 MG capsule Take one po tid 270 capsule 3     ASPIRIN PO Take 81 mg by mouth daily       Aspirin-Acetaminophen-Caffeine (EXCEDRIN PO) Take by mouth as needed       carvedilol (COREG) 12.5 MG tablet Take 1 tablet (12.5 mg) by mouth 2 times daily (with meals) 180 tablet 3     cyclobenzaprine (FLEXERIL) 10 MG tablet Take 1 tablet (10 mg) by mouth 3 times daily as needed for muscle spasms 90 tablet 5     diazepam (VALIUM) 10 MG tablet Take 1 tablet (10 mg) by mouth 3 times daily 90 tablet 5     famotidine (PEPCID) 40 MG tablet Take 1 tablet (40 mg) by mouth daily 90 tablet 3     fluticasone (FLONASE) 50 MCG/ACT nasal spray Spray 2 sprays into both nostrils every 12 hours 16 g 5     furosemide (LASIX) 20 MG tablet Take 1 tablet (20 mg) by mouth daily 90 tablet 3     ketoconazole (NIZORAL) 2 % external cream Apply topically 2 times daily 60 g 1     lisinopril (PRINIVIL/ZESTRIL) 5 MG tablet Take 1 tablet (5 mg) by mouth 2 times daily 180 tablet 3     MOTRIN IB PO Take by mouth as needed for  "moderate pain       potassium chloride ER (K-TAB/KLOR-CON) 10 MEQ CR tablet Take 1 tablet (10 mEq) by mouth daily 90 tablet 3     sildenafil (VIAGRA) 100 MG tablet Take 0.5-1 tablets ( mg) by mouth daily as needed Take 30 min to 4 hours before intercourse.  Never use with nitroglycerin, terazosin or doxazosin. 12 tablet 11     No current Epic-ordered facility-administered medications on file.         Mental Status Exam     Appearance:  Casually dressed and Adequately groomed  Behavior/relationship to examiner/demeanor:  Cooperative  Orientation: Oriented to person, place, time and situation  Psychomotor: slowed  Speech Rate:  Normal  Speech Spontaneity:  Normal  Mood:  \"the same\"  Affect:  Appropriate/mood-congruent and Dysphoric  Thought Process (Associations):  Goal directed and Circumstantial  Thought Content:  no overt psychosis, denies suicidal ideation, intent or thoughts and patient does not appear to be responding to internal stimuli  Abnormal Perception:  None  Attention/Concentration:  Normal  Insight:  Adequate  Judgment:  Adequate for safety      Results     Vital signs: There were no vitals taken for this visit. Arrived 50 minutes late and not time for rooming.    Laboratory Data:  reviewed from PCP. No concerns with psych medications or symptoms.    Assessment & Plan      Lul Arriola is seen today for follow up and reports continued baseline dysphoria. He does not want to try other medication.     Diagnosis  Encounter Diagnoses   Name Primary?     Dysthymia Yes     Anxiety state        Plan:  Medication: Continue current dose of Diazepam 10mg tid  OTC Recommendations: none  Lab Orders:  none  Referrals: will see if PCP will take over refills for Diazepam. This note was routed to Donald Lorenz to consider.   Release of Information: none  Future Treatment Considerations:per symptoms, other health  Return for Follow Up:if meds not taken over by Donald Lorenz will have scheduled " with other NP.   The risks, benefits, alternatives and side effects have been discussed and are understood by the patient. The patient understands the risks of using street drugs or alcohol. There are no medical contraindications, the patient agrees to treatment, and has the capacity to do so. The patient understands to call 911 or come to the nearest ED if life threatening or urgent symptoms present.  In addition time was spent counseling the patient and/or coordinating care regarding review of social and occupational functioning.  In addition patient was counseled on health and wellness practices to augment medication treatment of symptoms. See note for details.    Mary Jane Macario, APRN CNS 3/9/2020

## 2020-03-09 NOTE — Clinical Note
Hello, wondering if you would take over diazepam for Lul as he has been on this for many years and I am taking a new job outside of [employer]. We are trying to have patients who are stable see their PCP then do a transfer in our clinic. Could you let me know if you are ok with this? He won't need refills until September.  Thank you,  Mary Jane SOSA, APRN

## 2020-04-26 DIAGNOSIS — G44.209 TENSION HEADACHE: ICD-10-CM

## 2020-04-26 DIAGNOSIS — M54.2 NECK PAIN: Primary | ICD-10-CM

## 2020-04-27 RX ORDER — BUTALBITAL, ACETAMINOPHEN AND CAFFEINE 50; 325; 40 MG/1; MG/1; MG/1
TABLET ORAL
Qty: 15 TABLET | Refills: 5 | Status: SHIPPED | OUTPATIENT
Start: 2020-04-27 | End: 2020-09-11

## 2020-07-08 DIAGNOSIS — J30.89 SEASONAL ALLERGIC RHINITIS DUE TO OTHER ALLERGIC TRIGGER: ICD-10-CM

## 2020-07-13 RX ORDER — FLUTICASONE PROPIONATE 50 MCG
2 SPRAY, SUSPENSION (ML) NASAL 2 TIMES DAILY
Qty: 16 G | Refills: 3 | Status: SHIPPED | OUTPATIENT
Start: 2020-07-13 | End: 2020-09-11

## 2020-09-11 ENCOUNTER — OFFICE VISIT (OUTPATIENT)
Dept: FAMILY MEDICINE | Facility: CLINIC | Age: 60
End: 2020-09-11
Payer: MEDICARE

## 2020-09-11 VITALS
OXYGEN SATURATION: 98 % | SYSTOLIC BLOOD PRESSURE: 131 MMHG | BODY MASS INDEX: 27.7 KG/M2 | HEART RATE: 71 BPM | WEIGHT: 198.6 LBS | DIASTOLIC BLOOD PRESSURE: 80 MMHG

## 2020-09-11 DIAGNOSIS — J30.89 SEASONAL ALLERGIC RHINITIS DUE TO OTHER ALLERGIC TRIGGER: ICD-10-CM

## 2020-09-11 DIAGNOSIS — N52.8 OTHER MALE ERECTILE DYSFUNCTION: ICD-10-CM

## 2020-09-11 DIAGNOSIS — I42.8 CARDIOMYOPATHY, NONISCHEMIC (H): Primary | ICD-10-CM

## 2020-09-11 DIAGNOSIS — I42.8 CARDIOMYOPATHY, NONISCHEMIC (H): ICD-10-CM

## 2020-09-11 DIAGNOSIS — Z12.5 ENCOUNTER FOR SCREENING FOR MALIGNANT NEOPLASM OF PROSTATE: ICD-10-CM

## 2020-09-11 DIAGNOSIS — M54.2 NECK PAIN: ICD-10-CM

## 2020-09-11 DIAGNOSIS — Z00.00 HEALTH CARE MAINTENANCE: ICD-10-CM

## 2020-09-11 DIAGNOSIS — E78.00 HIGH CHOLESTEROL: ICD-10-CM

## 2020-09-11 DIAGNOSIS — B00.9 HSV (HERPES SIMPLEX VIRUS) INFECTION: ICD-10-CM

## 2020-09-11 DIAGNOSIS — K21.9 GASTROESOPHAGEAL REFLUX DISEASE WITHOUT ESOPHAGITIS: ICD-10-CM

## 2020-09-11 DIAGNOSIS — G44.209 TENSION HEADACHE: ICD-10-CM

## 2020-09-11 DIAGNOSIS — F41.1 ANXIETY STATE: ICD-10-CM

## 2020-09-11 DIAGNOSIS — L21.9 SEBORRHEIC DERMATITIS: ICD-10-CM

## 2020-09-11 LAB
ALBUMIN SERPL-MCNC: 3.4 G/DL (ref 3.4–5)
ALP SERPL-CCNC: 88 U/L (ref 40–150)
ALT SERPL W P-5'-P-CCNC: 35 U/L (ref 0–70)
ANION GAP SERPL CALCULATED.3IONS-SCNC: 8 MMOL/L (ref 3–14)
AST SERPL W P-5'-P-CCNC: 16 U/L (ref 0–45)
BILIRUB SERPL-MCNC: 0.4 MG/DL (ref 0.2–1.3)
BUN SERPL-MCNC: 18 MG/DL (ref 7–30)
CALCIUM SERPL-MCNC: 8.8 MG/DL (ref 8.5–10.1)
CHLORIDE SERPL-SCNC: 107 MMOL/L (ref 94–109)
CHOLEST SERPL-MCNC: 237 MG/DL
CO2 SERPL-SCNC: 25 MMOL/L (ref 20–32)
CREAT SERPL-MCNC: 1.09 MG/DL (ref 0.66–1.25)
GFR SERPL CREATININE-BSD FRML MDRD: 74 ML/MIN/{1.73_M2}
GLUCOSE SERPL-MCNC: 104 MG/DL (ref 70–99)
HDLC SERPL-MCNC: 44 MG/DL
LDLC SERPL CALC-MCNC: 113 MG/DL
NONHDLC SERPL-MCNC: 193 MG/DL
POTASSIUM SERPL-SCNC: 3.5 MMOL/L (ref 3.4–5.3)
PROT SERPL-MCNC: 7.5 G/DL (ref 6.8–8.8)
PSA SERPL-ACNC: 0.58 UG/L (ref 0–4)
SODIUM SERPL-SCNC: 140 MMOL/L (ref 133–144)
TRIGL SERPL-MCNC: 397 MG/DL

## 2020-09-11 RX ORDER — FLUTICASONE PROPIONATE 50 MCG
2 SPRAY, SUSPENSION (ML) NASAL 2 TIMES DAILY
Qty: 16 G | Refills: 3 | Status: SHIPPED | OUTPATIENT
Start: 2020-09-11 | End: 2021-09-17

## 2020-09-11 RX ORDER — ACYCLOVIR 200 MG/1
CAPSULE ORAL
Qty: 270 CAPSULE | Refills: 3 | Status: SHIPPED | OUTPATIENT
Start: 2020-09-11 | End: 2021-09-17

## 2020-09-11 RX ORDER — BUTALBITAL, ACETAMINOPHEN AND CAFFEINE 50; 325; 40 MG/1; MG/1; MG/1
TABLET ORAL
Qty: 15 TABLET | Refills: 5 | Status: SHIPPED | OUTPATIENT
Start: 2020-09-11 | End: 2021-03-09

## 2020-09-11 RX ORDER — CYCLOBENZAPRINE HCL 10 MG
10 TABLET ORAL 3 TIMES DAILY PRN
Qty: 90 TABLET | Refills: 5 | Status: SHIPPED | OUTPATIENT
Start: 2020-09-11 | End: 2021-09-17

## 2020-09-11 RX ORDER — KETOCONAZOLE 20 MG/G
CREAM TOPICAL 2 TIMES DAILY
Qty: 60 G | Refills: 1 | Status: SHIPPED | OUTPATIENT
Start: 2020-09-11 | End: 2021-09-17

## 2020-09-11 RX ORDER — FAMOTIDINE 40 MG/1
40 TABLET, FILM COATED ORAL DAILY
Qty: 90 TABLET | Refills: 3 | Status: SHIPPED | OUTPATIENT
Start: 2020-09-11 | End: 2021-09-17

## 2020-09-11 RX ORDER — SILDENAFIL 100 MG/1
50-100 TABLET, FILM COATED ORAL DAILY PRN
Qty: 12 TABLET | Refills: 11 | Status: SHIPPED | OUTPATIENT
Start: 2020-09-11 | End: 2021-09-17

## 2020-09-11 RX ORDER — DIAZEPAM 10 MG
10 TABLET ORAL 3 TIMES DAILY
Qty: 90 TABLET | Refills: 5 | Status: SHIPPED | OUTPATIENT
Start: 2020-09-11 | End: 2021-09-17

## 2020-09-11 ASSESSMENT — PAIN SCALES - GENERAL: PAINLEVEL: NO PAIN (0)

## 2020-09-11 NOTE — PROGRESS NOTES
SUBJECTIVE:    Pt is a 59 year old male with pmh of     Patient Active Problem List   Diagnosis     Anxiety state     Dysthymia     Cardiomyopathy, nonischemic (H)       who is here for evaluation of had concerns including Recheck Medication (Pt would like to recheck medication).    Here for f/u  GERD still, h2 blocker helps/tolerated  Anxiety: see psychiatry note, helpful tolerated, they've switched to me  Skin lesions: two on back that itch/bleed  HSV: prn zovirax helps  Heart failure: stable only tolerates half dose coreg will check routine labs  PSA due  Seasonal allergies: meds as listed helpful/tolerated  Seb derm: bad, out of meds, face red/flaky  HA, esgic, flexeril help/tolerated    Past Medical History:   Diagnosis Date     Cardiomyopathy, nonischemic (H) 3/23/2018     Allergies   Allergen Reactions     Ultram [Tramadol] Nausea     Baclofen      Imitrex [Sumatriptan] Nausea     Ivp Dye [Contrast Dye] Nausea and Vomiting     N & V, eyes, nose, throat swelled as a child     Keflex [Cephalexin Hcl]      Remeron Soltab      Valtrex [Valacyclovir] Other (See Comments)     Stomach pains     Celebrex [Celecoxib] Palpitations                     Current Outpatient Medications   Medication Sig Dispense Refill     acyclovir (ZOVIRAX) 200 MG capsule Take one po tid 270 capsule 3     ASPIRIN PO Take 81 mg by mouth daily       Aspirin-Acetaminophen-Caffeine (EXCEDRIN PO) Take by mouth as needed       butalbital-acetaminophen-caffeine (ESGIC) -40 MG tablet TAKE ONE TABLET BY MOUTH EVERY 4 HOURS AS NEEDED FOR PAIN 15 tablet 5     carvedilol (COREG) 12.5 MG tablet Take 1 tablet (12.5 mg) by mouth 2 times daily (with meals) 180 tablet 3     cyclobenzaprine (FLEXERIL) 10 MG tablet Take 1 tablet (10 mg) by mouth 3 times daily as needed for muscle spasms 90 tablet 5     diazepam (VALIUM) 10 MG tablet Take 1 tablet (10 mg) by mouth 3 times daily 90 tablet 5     famotidine (PEPCID) 40 MG tablet Take 1 tablet (40 mg) by  mouth daily 90 tablet 3     fluticasone (FLONASE) 50 MCG/ACT nasal spray Spray 2 sprays into both nostrils 2 times daily 16 g 3     furosemide (LASIX) 20 MG tablet Take 1 tablet (20 mg) by mouth daily 90 tablet 3     ketoconazole (NIZORAL) 2 % external cream Apply topically 2 times daily 60 g 1     lisinopril (PRINIVIL/ZESTRIL) 5 MG tablet Take 1 tablet (5 mg) by mouth 2 times daily 180 tablet 3     MOTRIN IB PO Take by mouth as needed for moderate pain       potassium chloride ER (K-TAB/KLOR-CON) 10 MEQ CR tablet Take 1 tablet (10 mEq) by mouth daily 90 tablet 3     sildenafil (VIAGRA) 100 MG tablet Take 0.5-1 tablets ( mg) by mouth daily as needed Take 30 min to 4 hours before intercourse.  Never use with nitroglycerin, terazosin or doxazosin. 12 tablet 11       Social History     Tobacco Use     Smoking status: Former Smoker     Packs/day: 0.50     Years: 30.00     Pack years: 15.00     Types: Cigarettes     Smokeless tobacco: Never Used   Substance Use Topics     Alcohol use: No     Alcohol/week: 0.0 standard drinks     Drug use: No           OBJECTIVE:  /80 (BP Location: Left arm, Patient Position: Sitting, Cuff Size: Adult Large)   Pulse 71   Wt 90.1 kg (198 lb 9.6 oz)   SpO2 98%   BMI 27.70 kg/m    GENERAL APPEARANCE: Alert, no acute distress  SKIN: Multiple seb K's on back one upper one right flank are itchy and bleed so frozen w/ liquid nitrogen w/ frost ring on his request, has done these before. Face flaky red in seb d pattern  NEURO: Alert, oriented, speech and mentation normal  PSYCHE: mentation appears normal, affect and mood normal    ASSESSMENT/PLAN:    High chol:   Labs  HCM: psa, declines all shots  CHF: to see heart MD this fall again  West k's: frozen, f/u prn  GERD: cont h2  Anciety: valium per psych  HA: esgic, flexeril prn  ED: prn viagra refilled  Seasonal allergies; cont meds as refilled  HSV: cont prn zovirax      Seb d: refilled topicals    PATRICIA BAUM MD

## 2020-09-11 NOTE — NURSING NOTE
Chief Complaint   Patient presents with     Recheck Medication     Pt would like to recheck medication     Teodora Trotter, EMT at 1:15 PM sign on 9/11/2020

## 2020-11-04 ENCOUNTER — VIRTUAL VISIT (OUTPATIENT)
Dept: CARDIOLOGY | Facility: CLINIC | Age: 60
End: 2020-11-04
Attending: INTERNAL MEDICINE
Payer: MEDICARE

## 2020-11-04 VITALS — HEART RATE: 69 BPM | DIASTOLIC BLOOD PRESSURE: 81 MMHG | SYSTOLIC BLOOD PRESSURE: 136 MMHG

## 2020-11-04 DIAGNOSIS — I42.9 CARDIOMYOPATHY, UNSPECIFIED TYPE (H): Primary | ICD-10-CM

## 2020-11-04 DIAGNOSIS — I10 BENIGN ESSENTIAL HYPERTENSION: ICD-10-CM

## 2020-11-04 DIAGNOSIS — I50.22 CHRONIC SYSTOLIC CONGESTIVE HEART FAILURE (H): ICD-10-CM

## 2020-11-04 PROCEDURE — 99443 PR PHYSICIAN TELEPHONE EVALUATION 21-30 MIN: CPT | Mod: 95 | Performed by: INTERNAL MEDICINE

## 2020-11-04 RX ORDER — LISINOPRIL 5 MG/1
5 TABLET ORAL 2 TIMES DAILY
Qty: 180 TABLET | Refills: 1 | Status: SHIPPED | OUTPATIENT
Start: 2020-11-04 | End: 2021-05-17

## 2020-11-04 RX ORDER — FUROSEMIDE 20 MG
20 TABLET ORAL DAILY
Qty: 90 TABLET | Refills: 1 | Status: SHIPPED | OUTPATIENT
Start: 2020-11-04 | End: 2021-05-17

## 2020-11-04 RX ORDER — CARVEDILOL 12.5 MG/1
12.5 TABLET ORAL 2 TIMES DAILY WITH MEALS
Qty: 180 TABLET | Refills: 3 | Status: SHIPPED | OUTPATIENT
Start: 2020-11-04 | End: 2021-11-10

## 2020-11-04 RX ORDER — POTASSIUM CHLORIDE 750 MG/1
10 TABLET, EXTENDED RELEASE ORAL DAILY
Qty: 90 TABLET | Refills: 3 | Status: SHIPPED | OUTPATIENT
Start: 2020-11-04 | End: 2021-11-10

## 2020-11-04 NOTE — PROGRESS NOTES
"Service Date: 2020      Donald Lorenz MD   [facility]   [address] [place] [address], [address]   [place] [address]      RE:    Lul Arriola   MRN:  [id number]   :  1960      Dear Dr. Lorenz:      It was a pleasure participating in the care of your patient, Mr. Lul Arriola.  As you know, he is a 59-year-old gentleman who I spoke to over the phone today for severe cardiomyopathy.      His past medical history is significant for the followin.  Hypertension.   2.  Hyperlipidemia.   3.  Anxiety.   4.  Dysthymia   5.  Hodgkin lymphoma, status post chemotherapy and radiation.   6.  Depression/anxiety.   7.  Tobacco abuse.   8.  Osteoporosis.      His cardiac history is significant for a severe cardiomyopathy thought secondary to chemotherapy.  The patient has a history of Hodgkin disease diagnosed in , status post chemotherapy and radiation.  According to Dr. Blanco's prior documentation, the patient never had any angiogram or any type of ischemic workup due to the patient refusing it in the past.  He has also refused ICD placement as well.      It should be noted that the patient's affect continues to be bitter and frustrated and borderline hostile in terms of his overall healthcare experience.  Again, despite the large almost 2-year gap since I saw him, he continues to use the phrase that his \"life is over now, that his heart function is low and that lungs his shot.\"      He denies any significant chest pain, PND, orthopnea, edema, palpitations, syncope or near-syncope.  He says he can walk through the store once before he gets short of breath.      His most recent issue has been that of dry eyes, so that he cannot watch television at night and he cut his p.m. dose of carvedilol in half and found that it may be improving.  He also saw an ophthalmologist who recommended some moisturizing drops, which he just started.      As you recall, he quit smoking about 4-1/2 years ago.  He " denies a family history of heart disease.  He does have hyperlipidemia.  He used to work in a warehouse.      CURRENT MEDICATIONS:     1.  Aspirin 81 mg a day.   2.  Sildenafil.   3.  Lasix 20 mg a day.   4.  Lisinopril 5 mg twice daily.   5.  Carvedilol 12.5 mg twice daily.      PHYSICAL EXAMINATION:       GENERAL:  His decision-making capabilities are intact.   PSYCHIATRIC:  He is alert and oriented x3.     RESPIRATORY:  He is breathing comfortably without gross cough.      The remainder of the comprehensive physical exam was deferred secondary to COVID-19 pandemic and secondary to telephone visit restrictions.      On 09/11/2020, potassium 3.5, GFR normal.  Lipids were elevated.      Echocardiogram back on 12/01/2017 revealed severely decreased LV systolic function with ejection fraction in the 23% range.  No significant valvular pathology identified.        IMPRESSION:      Lul is a 59-year-old gentleman whose cardiac history is significant for severe likely nonischemic cardiomyopathy, presumed secondary to chemotherapy in the past.  He was previously a patient of Dr. Blanco, who confirms that the patient never underwent an ischemic workup in the past and has been reluctant to even continue medical therapy at all.  He has refused ICD placement as well as refused further testing, specifically coronary angiography, coronary CTA and even echocardiography at this point.      His affect reflects a negative perspective toward his overall general health and well-being.  I spent quite a bit of time offering him further workup, treatment, adjustments in his medications and other options, but he declines them all and wishes to simply continue his current medication regimen without further dosage adjustments, new medications, studies or further workup.  When I offered to even schedule an echocardiogram in the future, he said that he will call back to consider it next spring.          PLAN:     1.  He strongly requested  that I just renew his medications for now and that he will contact us in the future if he should elect to have further medication adjustments or studies.     2.  He did mention that he will try to reinstate his carvedilol dosing at full p.m. dose and if his dry eyes return, then he requests a change to Toprol-XL 50 mg a day if needed as he is currently taking carvedilol 12.5 twice daily.      I also offered to schedule him a followup appointment along with an echo today and he refused and says he will simply call us back when he feels the time is right.      Once again, it was a pleasure participating in the care of your patient, Mr. Mateo Goodman.  Please feel free to contact me anytime if any questions regarding his care in the future.      Sincerely,            ISAAC MARQUEZ MD             D: 2020   T: 2020   MT: OCTAVIA      Name:     MATEO GOODMAN   MRN:      [id number]        Account:      [id number]   :      1960           Service Date: 2020      Document: [id number]

## 2020-11-04 NOTE — PROGRESS NOTES
"Lul Arriola is a 59 year old male who is being evaluated via a billable telephone visit.      The patient has been notified of following:     \"This telephone visit will be conducted via a call between you and your physician/provider. We have found that certain health care needs can be provided without the need for a physical exam.  This service lets us provide the care you need with a short phone conversation.  If a prescription is necessary we can send it directly to your pharmacy.  If lab work is needed we can place an order for that and you can then stop by our lab to have the test done at a later time.    If during the course of the call the physician/provider feels a telephone visit is not appropriate, you will not be charged for this service.\"    Patient has given verbal consent for Telephone visit?  Yes    What phone number would you like to be contacted at? [phone]    How would you like to obtain your AVS? mail      Patient opted to conduct today's return visit via telephone vs an in person visit to the clinic.    The reason for the telephone visit was:  cardiomyopathy    Phone call duration:24min    See dictation #[id number]    "

## 2020-11-04 NOTE — PATIENT INSTRUCTIONS
Patient Instructions:  It was a pleasure to see you in the cardiology clinic today.      If you have any questions, you can reach my nurse, Dede ROUSE LPN, at [phone].  Press Option #1 for the [facility], and then press Option #4 for nursing.    We are encouraging the use of [facility] to communicate with your HealthCare Provider    Medication Changes: None.    Recommendations: None.    Studies Ordered: None.    The results from today include: None.    Please follow up: With Dr. Reynolds spring 2021.    Sincerely,    Dalton Reynolds MD     If you have an urgent need after hours (8:00 am to 4:30 pm) please call [phone] and ask for the cardiology fellow on call.

## 2020-11-04 NOTE — LETTER
"2020      RE: Lul Arriola  [address] [facility] [address]       Dear Colleague,    Thank you for the opportunity to participate in the care of your patient, Lul Arriola, at the [facility]. Please see a copy of my visit note below.    Lul Arriola is a 59 year old male who is being evaluated via a billable telephone visit.      The patient has been notified of following:     \"This telephone visit will be conducted via a call between you and your physician/provider. We have found that certain health care needs can be provided without the need for a physical exam.  This service lets us provide the care you need with a short phone conversation.  If a prescription is necessary we can send it directly to your pharmacy.  If lab work is needed we can place an order for that and you can then stop by our lab to have the test done at a later time.    If during the course of the call the physician/provider feels a telephone visit is not appropriate, you will not be charged for this service.\"    Patient has given verbal consent for Telephone visit?  Yes    What phone number would you like to be contacted at? [phone]    How would you like to obtain your AVS? mail      Patient opted to conduct today's return visit via telephone vs an in person visit to the clinic.    The reason for the telephone visit was:  cardiomyopathy    Phone call duration:24min      Service Date: 2020      Donald Lorenz MD   [facility]   [address] [place] [address] [address]   [place] [address]      RE:    Lul Arriola   MRN:  [id number]   :  1960      Dear Dr. Lorenz:      It was a pleasure participating in the care of your patient, Mr. Lul Arriola.  As you know, he is a 59-year-old gentleman who I spoke to over the phone today for severe cardiomyopathy.      His past medical history is significant for the " "followin.  Hypertension.   2.  Hyperlipidemia.   3.  Anxiety.   4.  Dysthymia   5.  Hodgkin lymphoma, status post chemotherapy and radiation.   6.  Depression/anxiety.   7.  Tobacco abuse.   8.  Osteoporosis.      His cardiac history is significant for a severe cardiomyopathy thought secondary to chemotherapy.  The patient has a history of Hodgkin disease diagnosed in , status post chemotherapy and radiation.  According to Dr. Blanco's prior documentation, the patient never had any angiogram or any type of ischemic workup due to the patient refusing it in the past.  He has also refused ICD placement as well.      It should be noted that the patient's affect continues to be bitter and frustrated and borderline hostile in terms of his overall healthcare experience.  Again, despite the large almost 2-year gap since I saw him, he continues to use the phrase that his \"life is over now, that his heart function is low and that lungs his shot.\"      He denies any significant chest pain, PND, orthopnea, edema, palpitations, syncope or near-syncope.  He says he can walk through the store once before he gets short of breath.      His most recent issue has been that of dry eyes, so that he cannot watch television at night and he cut his p.m. dose of carvedilol in half and found that it may be improving.  He also saw an ophthalmologist who recommended some moisturizing drops, which he just started.      As you recall, he quit smoking about 4-1/2 years ago.  He denies a family history of heart disease.  He does have hyperlipidemia.  He used to work in a warehouse.      CURRENT MEDICATIONS:   1.  Aspirin 81 mg a day.   2.  Sildenafil.   3.  Lasix 20 mg a day.   4.  Lisinopril 5 mg twice daily.   5.  Carvedilol 12.5 mg twice daily.      PHYSICAL EXAMINATION:     GENERAL:  His decision-making capabilities are intact.   PSYCHIATRIC:  He is alert and oriented x3.     RESPIRATORY:  He is breathing comfortably without gross " cough.      The remainder of the comprehensive physical exam was deferred secondary to COVID-19 pandemic and secondary to telephone visit restrictions.      On 09/11/2020, potassium 3.5, GFR normal.  Lipids were elevated.      Echocardiogram back on 12/01/2017 revealed severely decreased LV systolic function with ejection fraction in the 23% range.  No significant valvular pathology identified.      IMPRESSION:  Lul is a 59-year-old gentleman whose cardiac history is significant for severe likely nonischemic cardiomyopathy, presumed secondary to chemotherapy in the past.  He was previously a patient of Dr. Blanco, who confirms that the patient never underwent an ischemic workup in the past and has been reluctant to even continue medical therapy at all.  He has refused ICD placement as well as refused further testing, specifically coronary angiography, coronary CTA and even echocardiography at this point.      His affect reflects a negative attitude toward his overall general health and well-being.  I spent quite a bit of time offering him further workup, treatment, adjustments in his medications and other options, but he declines them all and wishes to simply continue his current medication regimen without further dosage adjustments, new medications, studies or even ultrasounds or further workup.  When I offered to even schedule an echocardiogram in the future, he said that he will call back to schedule it next spring.        PLAN:   1.  He requested that I just renew his medications for now and that he will contact us in the future if he should elect to have further medication adjustments or studies.   2.  He did mention that he will try to reinstate his carvedilol dosing at full p.m. dose and if his dry eyes return, then he requests a change to Toprol-XL 50 mg a day if needed as he is currently taking carvedilol 12.5 twice daily.      I also offered to schedule him a followup appointment along with an echo  today and he refused and says he will simply call us back when he feels the time is right.      Once again, it was a pleasure participating in the care of your patient, Mr. Mateo Goodman.  Please feel free to contact me anytime if any questions regarding his care in the future.      Sincerely,         Dalton Reynolds MD                    D: 2020   T: 2020   MT:       Name:     MATEO GOODMAN   MRN:      [id number]        Account:      [id number]   :      1960           Service Date: 2020      Document: [id number]        Please do not hesitate to contact me if you have any questions/concerns.     Sincerely,     Dalton Reynolds MD

## 2021-02-01 ENCOUNTER — TELEPHONE (OUTPATIENT)
Dept: DERMATOLOGY | Facility: CLINIC | Age: 61
End: 2021-02-01

## 2021-02-01 NOTE — TELEPHONE ENCOUNTER
[facility] Call Center    Phone Message    May a detailed message be left on voicemail: no     Reason for Call: Other: `      Pt wants to know more information about what care he may receive at his 3/4/21appt with Dr Montenegro. Pt is coming from far away and is curious if it may be possible for the  to take skin samples and close wounds.    Please call Pt to discuss.    Action Taken: Message routed to:  [facility] ([facility] Dermatology    Travel Screening: Not Applicable

## 2021-03-04 ENCOUNTER — OFFICE VISIT (OUTPATIENT)
Dept: DERMATOLOGY | Facility: CLINIC | Age: 61
End: 2021-03-04
Payer: MEDICARE

## 2021-03-04 DIAGNOSIS — D48.5 NEOPLASM OF UNCERTAIN BEHAVIOR OF SKIN: Primary | ICD-10-CM

## 2021-03-04 DIAGNOSIS — L72.9 CYST OF SKIN: ICD-10-CM

## 2021-03-04 PROCEDURE — 99203 OFFICE O/P NEW LOW 30 MIN: CPT | Mod: 25 | Performed by: DERMATOLOGY

## 2021-03-04 PROCEDURE — 11102 TANGNTL BX SKIN SINGLE LES: CPT | Performed by: DERMATOLOGY

## 2021-03-04 PROCEDURE — 88305 TISSUE EXAM BY PATHOLOGIST: CPT | Performed by: DERMATOLOGY

## 2021-03-04 ASSESSMENT — PAIN SCALES - GENERAL: PAINLEVEL: NO PAIN (0)

## 2021-03-04 NOTE — LETTER
3/4/2021       RE: Lul Arriola  [address]  [facility] [address]     Dear Colleague,    Thank you for referring your patient, Lul Arriola, to the [facility] [place] at [facility]. Please see a copy of my visit note below.    [facility] Dermatology Note  Encounter Date: Mar 4, 2021  Office Visit     Dermatology Problem List:  1. Hx Hodgkin's lymphoma.   - s/p chemoradiation in the early 1990s.   2. NUB, L chest. S/p shave bx 3/4/21.   3. Cystic nodule, L 3rd digit. Ddx cyst/EIC, myxoid cyst, other.   - photodocumentation; consider referral to derm surg for excision for diagnosis/removal.     ____________________________________________    Assessment & Plan:     # Neoplasm of uncertain behavior of skin, left chest. The differential diagnosis includes BCC versus other.   - Shave biopsy as below    # Cystic nodule, L 3rd digit. Ddx cyst/EIC, myxoid cyst, vs. other. Clinical photodocumentation as below. Would consider excision of lesion per derm surg for diagnosis and removal at time. Will defer scheduling for this for now awaiting biopsy results as above, with plan to perform excision of suspected NMSC as above on same day. Patient expressed understanding and agreed to plan.     Procedures Performed:   - Shave biopsy procedure note, location(s): left chest. After discussion of benefits and risks including but not limited to bleeding, infection, scar, incomplete removal, recurrence, and non-diagnostic biopsy, written consent and photographs were obtained. The area was cleaned with isopropyl alcohol. 0.5mL of 1% lidocaine with epinephrine was injected to obtain adequate anesthesia of lesion(s). Shave biopsy at site(s) performed. Hemostasis was achieved with aluminium chloride. Petrolatum ointment and a sterile dressing were applied. The patient tolerated the procedure and no complications were noted. The  "patient was provided with verbal and written post care instructions.     Follow-up: pending path results    Staff:     Tima Montenegro MD  Pronouns: he/him/his    Department of Dermatology  [facility] [place]: Phone: [phone], Fax:[phone]  [facility]: Phone: [phone] Fax: [phone]    ____________________________________________    CC: Skin Check (Lul is here today for a skin check )    HPI:  Mr. Lul Arriola is a(n) 60 year old male who presents today as a new patient for evaluation of two areas of concern on the (1) left chest, and (2) left 3rd digit.     (1) Left chest - states the lesion has been present for at least 2-3 years, but over the last 3-4 months has become crusty, tender, and occasionally bleeds. He has similar \"age spots\" on his back, but none have been tender in the past. No personal history of skin cancer.     (2) Left 3rd digit - He reports a small \"bump\" on the left 3rd digit that has been present for a few years, but recently increasing in size. No drainage associated. No pain, tenderness, though mildly itchy. He would like this removed.     The patient otherwise denies any new or concerning lesions. No bleeding, painful, pruritic, or changing lesions. They report no personal history of skin cancer. There is no family history of skin cancer. There is a history of immunosuppression. No history of indoor tanning. They do use sunscreen and protective clothing when outdoors for sun protection. No occupational exposure to ultraviolet light or other forms of radiation. Patient is a . Health otherwise stable. No other skin concerns.     Patient is otherwise feeling well, without additional skin concerns.     Labs Reviewed:  N/A    Physical Exam:  Vitals: There were no vitals taken for this visit.  SKIN: Focused examination of chest and hands was performed.  - " Glen Type II  - On the left chest, there is a 1.0 cm pink plaque with central crusted erosion, peripherally with arborizing vessels appreciated under dermoscopy.  - On the left 3rd digit, there is a ~6-7 mm, skin-colored cystic nodule, freely mobile and non-tender. No punctum appreciated. Lesion was pierced superficially with 11-blade and no drainage appreciated.   - No other lesions of concern on areas examined.             Medications:  Current Outpatient Medications   Medication     acyclovir (ZOVIRAX) 200 MG capsule     Aspirin-Acetaminophen-Caffeine (EXCEDRIN PO)     butalbital-acetaminophen-caffeine (ESGIC) -40 MG tablet     carvedilol (COREG) 12.5 MG tablet     cyclobenzaprine (FLEXERIL) 10 MG tablet     diazepam (VALIUM) 10 MG tablet     famotidine (PEPCID) 40 MG tablet     fluticasone (FLONASE) 50 MCG/ACT nasal spray     furosemide (LASIX) 20 MG tablet     ketoconazole (NIZORAL) 2 % external cream     lisinopril (ZESTRIL) 5 MG tablet     potassium chloride ER (K-TAB/KLOR-CON) 10 MEQ CR tablet     sildenafil (VIAGRA) 100 MG tablet     ASPIRIN PO     MOTRIN IB PO     No current facility-administered medications for this visit.       Past Medical History:   Patient Active Problem List   Diagnosis     Anxiety state     Dysthymia     Cardiomyopathy, nonischemic (H)     Past Medical History:   Diagnosis Date     Cardiomyopathy, nonischemic (H) 3/23/2018

## 2021-03-04 NOTE — PROGRESS NOTES
[facility] Dermatology Note  Encounter Date: Mar 4, 2021  Office Visit     Dermatology Problem List:  1. Hx Hodgkin's lymphoma.   - s/p chemoradiation in the early 1990s.   2. NUB, L chest. S/p shave bx 3/4/21.   3. Cystic nodule, L 3rd digit. Ddx cyst/EIC, myxoid cyst, other.   - photodocumentation; consider referral to derm surg for excision for diagnosis/removal.     ____________________________________________    Assessment & Plan:     # Neoplasm of uncertain behavior of skin, left chest. The differential diagnosis includes BCC versus other.   - Shave biopsy as below    # Cystic nodule, L 3rd digit. Ddx cyst/EIC, myxoid cyst, vs. other. Clinical photodocumentation as below. Would consider excision of lesion per derm surg for diagnosis and removal at time. Will defer scheduling for this for now awaiting biopsy results as above, with plan to perform excision of suspected NMSC as above on same day. Patient expressed understanding and agreed to plan.     Procedures Performed:   - Shave biopsy procedure note, location(s): left chest. After discussion of benefits and risks including but not limited to bleeding, infection, scar, incomplete removal, recurrence, and non-diagnostic biopsy, written consent and photographs were obtained. The area was cleaned with isopropyl alcohol. 0.5mL of 1% lidocaine with epinephrine was injected to obtain adequate anesthesia of lesion(s). Shave biopsy at site(s) performed. Hemostasis was achieved with aluminium chloride. Petrolatum ointment and a sterile dressing were applied. The patient tolerated the procedure and no complications were noted. The patient was provided with verbal and written post care instructions.     Follow-up: pending path results    Staff:     Tima Montenegro MD  Pronouns: he/him/his    Department of Dermatology  [facility]: Phone: [phone],  "Fax:[phone]  [facility]: Phone: [phone] Fax: [phone]    ____________________________________________    CC: Skin Check (Lul is here today for a skin check )    HPI:  Mr. Lul Arriola is a(n) 60 year old male who presents today as a new patient for evaluation of two areas of concern on the (1) left chest, and (2) left 3rd digit.     (1) Left chest - states the lesion has been present for at least 2-3 years, but over the last 3-4 months has become crusty, tender, and occasionally bleeds. He has similar \"age spots\" on his back, but none have been tender in the past. No personal history of skin cancer.     (2) Left 3rd digit - He reports a small \"bump\" on the left 3rd digit that has been present for a few years, but recently increasing in size. No drainage associated. No pain, tenderness, though mildly itchy. He would like this removed.     The patient otherwise denies any new or concerning lesions. No bleeding, painful, pruritic, or changing lesions. They report no personal history of skin cancer. There is no family history of skin cancer. There is a history of immunosuppression. No history of indoor tanning. They do use sunscreen and protective clothing when outdoors for sun protection. No occupational exposure to ultraviolet light or other forms of radiation. Patient is a . Health otherwise stable. No other skin concerns.     Patient is otherwise feeling well, without additional skin concerns.     Labs Reviewed:  N/A    Physical Exam:  Vitals: There were no vitals taken for this visit.  SKIN: Focused examination of chest and hands was performed.  - Glen Type II  - On the left chest, there is a 1.0 cm pink plaque with central crusted erosion, peripherally with arborizing vessels appreciated under dermoscopy.  - On the left 3rd digit, there is a ~6-7 mm, skin-colored cystic nodule, freely mobile and non-tender. No punctum appreciated. Lesion was " pierced superficially with 11-blade and no drainage appreciated.   - No other lesions of concern on areas examined.             Medications:  Current Outpatient Medications   Medication     acyclovir (ZOVIRAX) 200 MG capsule     Aspirin-Acetaminophen-Caffeine (EXCEDRIN PO)     butalbital-acetaminophen-caffeine (ESGIC) -40 MG tablet     carvedilol (COREG) 12.5 MG tablet     cyclobenzaprine (FLEXERIL) 10 MG tablet     diazepam (VALIUM) 10 MG tablet     famotidine (PEPCID) 40 MG tablet     fluticasone (FLONASE) 50 MCG/ACT nasal spray     furosemide (LASIX) 20 MG tablet     ketoconazole (NIZORAL) 2 % external cream     lisinopril (ZESTRIL) 5 MG tablet     potassium chloride ER (K-TAB/KLOR-CON) 10 MEQ CR tablet     sildenafil (VIAGRA) 100 MG tablet     ASPIRIN PO     MOTRIN IB PO     No current facility-administered medications for this visit.       Past Medical History:   Patient Active Problem List   Diagnosis     Anxiety state     Dysthymia     Cardiomyopathy, nonischemic (H)     Past Medical History:   Diagnosis Date     Cardiomyopathy, nonischemic (H) 3/23/2018

## 2021-03-04 NOTE — NURSING NOTE
Dermatology Rooming Note    Lul Arriola's goals for this visit include:   Chief Complaint   Patient presents with     Skin Check     Lul is here today for a skin check      JEAN PAUL Rose

## 2021-03-04 NOTE — PATIENT INSTRUCTIONS

## 2021-03-08 DIAGNOSIS — M54.2 NECK PAIN: ICD-10-CM

## 2021-03-08 DIAGNOSIS — G44.209 TENSION HEADACHE: ICD-10-CM

## 2021-03-08 LAB — COPATH REPORT: NORMAL

## 2021-03-09 ENCOUNTER — TELEPHONE (OUTPATIENT)
Dept: DERMATOLOGY | Facility: CLINIC | Age: 61
End: 2021-03-09

## 2021-03-09 DIAGNOSIS — C44.91 BCC (BASAL CELL CARCINOMA OF SKIN): Primary | ICD-10-CM

## 2021-03-09 RX ORDER — BUTALBITAL, ACETAMINOPHEN AND CAFFEINE 50; 325; 40 MG/1; MG/1; MG/1
TABLET ORAL
Qty: 15 TABLET | Refills: 0 | Status: SHIPPED | OUTPATIENT
Start: 2021-03-09 | End: 2021-03-12

## 2021-03-09 NOTE — RESULT ENCOUNTER NOTE
Can we call patient and let him know the spot on the left chest was a BCC.     Can we schedule for excision with derm surg?   He also has a small cyst on his his left 3rd digit (photo in Epic) that I would like derm surg to remove at that same time. Please place this in the derm surg referral as well.     He should otherwise follow-up with me in about 3 months for a full body skin examination.     Thanks!    Tima Montenegro MD  Pronouns: he/him/his    Department of Dermatology  [facility]: Phone: [phone], Fax:[phone]  [facility]: Phone: [phone] Fax: [phone]

## 2021-03-10 NOTE — TELEPHONE ENCOUNTER
FUTURE VISIT INFORMATION      FUTURE VISIT INFORMATION:    Date: 4.6.21    Time: 9:30    Location: [facility]  REFERRAL INFORMATION:    Referring provider:  Dr. Tima Montenegro    Referring providers clinic:  [facility] D[facility]atology    Reason for visit/diagnosis  BCC,  small cyst on his his left 3rd digit to be removed    RECORDS REQUESTED FROM:       Clinic name Comments Records Status Photos Status   [facility] Derm 3.4.21  Path # [id number] [facility] Epic

## 2021-03-11 DIAGNOSIS — M54.2 NECK PAIN: ICD-10-CM

## 2021-03-11 DIAGNOSIS — G44.209 TENSION HEADACHE: ICD-10-CM

## 2021-03-12 RX ORDER — BUTALBITAL, ACETAMINOPHEN AND CAFFEINE 50; 325; 40 MG/1; MG/1; MG/1
TABLET ORAL
Qty: 15 TABLET | Refills: 0 | Status: SHIPPED | OUTPATIENT
Start: 2021-03-12 | End: 2021-04-09

## 2021-03-12 NOTE — TELEPHONE ENCOUNTER
Patient Requested  butalbital-acetaminophen-caffeine (ESGIC) -40 MG tablet  Last Filled  03/09/2021  Last Office Visit  09/11/2020  Next Office Visit     Checked  03/12/2021    DX:     Pharmacy:     CHIARA MCCANN CMA at 1:56 PM on 3/12/2021.

## 2021-04-06 ENCOUNTER — OFFICE VISIT (OUTPATIENT)
Dept: DERMATOLOGY | Facility: CLINIC | Age: 61
End: 2021-04-06
Payer: MEDICARE

## 2021-04-06 ENCOUNTER — PRE VISIT (OUTPATIENT)
Dept: DERMATOLOGY | Facility: CLINIC | Age: 61
End: 2021-04-06

## 2021-04-06 VITALS — SYSTOLIC BLOOD PRESSURE: 150 MMHG | HEART RATE: 88 BPM | DIASTOLIC BLOOD PRESSURE: 81 MMHG

## 2021-04-06 DIAGNOSIS — M67.449 DIGITAL MUCINOUS CYST OF FINGER: Primary | ICD-10-CM

## 2021-04-06 DIAGNOSIS — C44.519 BASAL CELL CARCINOMA (BCC) OF SKIN OF OTHER PART OF TORSO: ICD-10-CM

## 2021-04-06 PROCEDURE — 13101 CMPLX RPR TRUNK 2.6-7.5 CM: CPT | Mod: GC | Performed by: DERMATOLOGY

## 2021-04-06 PROCEDURE — 11422 EXC H-F-NK-SP B9+MARG 1.1-2: CPT | Mod: GC | Performed by: DERMATOLOGY

## 2021-04-06 PROCEDURE — 17313 MOHS 1 STAGE T/A/L: CPT | Mod: GC | Performed by: DERMATOLOGY

## 2021-04-06 PROCEDURE — 17314 MOHS ADDL STAGE T/A/L: CPT | Mod: GC | Performed by: DERMATOLOGY

## 2021-04-06 PROCEDURE — 12041 INTMD RPR N-HF/GENIT 2.5CM/<: CPT | Mod: GC | Performed by: DERMATOLOGY

## 2021-04-06 PROCEDURE — 88305 TISSUE EXAM BY PATHOLOGIST: CPT | Performed by: DERMATOLOGY

## 2021-04-06 ASSESSMENT — PAIN SCALES - GENERAL: PAINLEVEL: NO PAIN (0)

## 2021-04-06 NOTE — LETTER
4/6/2021       RE: Lul Arriola  [address]  [place] [address]     Dear Colleague,    Thank you for referring your patient, Lul Arriola, to the [facility] [place] [facility]. Please see a copy of my visit note below.    [facility] Dermatologic Surgery Clinic [place] Procedure Note    Date of Service:  Apr 6, 2021  Surgery: Mohs micrographic surgery    Case 1  Repair Type: Complex linear repair   Repair Size: 6.0 cm  Suture Material: 4-0 Monocryl   Tumor Type: BCC  Location: left chest  Derm-Path Accession #: [id number]  PreOp Size: 2.1 x 1.8 cm  PostOp Size: 3.7 3.2 cm  Mohs Accession #:   Level of Defect: Fascia       Procedure:  We discussed the principles of treatment and most likely complications including scarring, bleeding, infection, swelling, pain, crusting, nerve damage, large wound,  incomplete excision, wound dehiscence,  nerve damage, recurrence, and a second procedure may be recommended to obtain the best cosmetic or functional result.    Informed consent was obtained and the patient underwent the procedure as follows:  The patient was placed supine on the operating table.  The cancer was identified, outlined with a marker, and verified by the patient.  The entire surgical field was prepped with chlorhexidine.  The surgical site was anesthetized using 1% lidocaine with epinephrine.      The area of clinically apparent tumor was debulked. The layer of tissue was then surgically excised using a #15 blade and was then transferred onto a specimen sheet maintaining the orientation of the specimen. Hemostasis was obtained using electrocoagulation. The wound site was then covered with a dressing while the tissue samples were processed for examination.    The excised tissue was transported to the Mohs histology laboratory maintaining the tissue orientation.  The tissue specimen was  relaxed so that the entire surgical margin was in a a single horizontal plane for sectioning and inked for precise mapping.  A precise reference map was drawn to reflect the sectioning of the specimen, colored inking of the margins, and orientation on the patient.  The tissue was processed using horizontal sectioning of the base and continuous peripheral margins.  The histopathologic sections were reviewed in conjunction with the reference map.    Total blocks: 2    Total slides:  6    Residual tumor was identified and indicated in red on the reference map, identifying the location where further tissue excision was necessary. Therefore, an additional stage of Mohs Micrographic surgery was deemed necessary.     Stage II   The patient was returned to the operating room, and the area prepped in the usual manner. The residual tumor was excised using the reference map as a guide. The specimen was transfered to a labeled specimen sheet maintaining the orientation of the specimen. Hemostasis was obtained and the wound site was covered with a dressing while the tissue was processed for examination.     The excised tissue was transported to the Mohs histology laboratory maintaining orientation. The specimen margins were inked for precise mapping and a reference map was prepared for the is additional stage to maintain precise orientation as described above. The tissue was processed using horizontal sectioning of the base and continuous peripheral margins. The histopathologic sections were reviewed in conjunction with the reference map.     Total blocks: 1    Total slides:  5    There were no cancer cells visualized on examination, therefore Mohs surgery was complete.     REPAIR:    A complex layered linear closure was selected as the procedure which would maximally preserve both function and cosmesis and for the following reasons: 1) the defect was widely undermined and 2) wound size, depth, tension, and location.     After  the excision of the tumor, the area was extensively and carefully undermined using blunt Metzenbaum scissors. Hemostasis was obtained with spot electrocautery and ligation of vessels where necessary.  An initial deep plication sutures of 4-0 Monocryl sutures  were placed in the deep, subcutaneous and fascial planes to appose the lateral margins.  The subcutaneous and dermal layers were then closed with 4-0 Monocryl sutures. The epidermis was then carefully approximated along the length of the wound using 4-0 monocryl subcuticular running sutures.     Estimated blood loss was less than 10 ml for all surgical sites. A sterile pressure dressing was applied and wound care instructions, with a written handout, were given. The patient was discharged from the Dermatologic Surgery Center alert and ambulatory.    Dr. Brown performed the micrographic surgery and Dr. Michaels performed the reconstruction.     Russell Brown MD      DERMATOLOGY EXCISION PROCEDURE NOTE    Dermatology Problem List:  1. Digital mucous cyst, 3rd L finger  - s/p excision 4/6/21    NAME OF PROCEDURE: Excision intermediate layered linear closure  Staff surgeon: Dr Michaels  Resident: Dr Cheng  Scrub Nurse: Rosamaria    PRE-OPERATIVE DIAGNOSIS:  Digital mucous cyst  POST-OPERATIVE DIAGNOSIS: Same   LOCATION: L middle 3rd finger  FINAL EXCISION SIZE(DEFECT SIZE):  0.7 x 1.1 cm  MARGIN: 1mm  FINAL REPAIR LENGTH: 1 cm   ANESTHESIA: 1% lidocaine with 1:100,000 epinephrine           INDICATIONS: This patient presented with a 0.5x0.9 cm digital mucous cystt. Excision was indicated. We discussed the principles of treatment and most likely complications including scarring, bleeding, infection, incomplete excision, wound dehiscence, pain, nerve damage, and recurrence. Informed consent was obtained and the patient underwent the procedure as follows:    PROCEDURE: The patient was taken to the operative suite. Time-out was performed.  The treatment area was anesthetized  with 1% lidocaine with epinephrine. The area was prepped with Chlorhexidine and rinsed with sterile saline and draped with sterile towels. The lesion was delineated and excised down to subcutaneous fat in a elliptical manner. Hemostasis was obtained by electrocoagulation.     REPAIR: An intermediate layered linear closure was selected as the procedure which would maximally preserve both function and cosmesis.    After the excision of the tumor, hemostasis was obtained with electrocoagulation.  Closure was oriented so that the wound was in the patient's natural skin tension lines. The subcutaneous and dermal layers were then closed with 4-0 Monocryl sutures. The epidermis was then carefully approximated along the length of the wound using  4-0 fast absorbing gut simple running sutures.     Estimated blood loss was less than 10 ml for all surgical sites. A sterile pressure dressing was applied and wound care instructions, with a written handout, were given. The patient was discharged from the Dermatologic Surgery Center alert and ambulatory.     The patient elected for pathology results to automatically release and understands that the clinical staff will contact them as soon as possible to notify them of the results.     Follow-up in 2-3 weeks for virtual visit.    Dr. Michaels was immediately available for the entire surgery and was physicially present for the key portions of the procedure.    Anatomic Pathology Results: pending    Clinical Follow-Up: 2-3 weeks for virtual visit.    Staff Involved:  Dr Michaels  Resident: Dr Cheng      Attending attestation:  I was present for key elements of the procedure and immediately available for all other portions of the procedure.  I have reviewed the note and edited it as necessary.    Dale Michaels M.D.  Professor  Director of Dermatologic Surgery  Department of Dermatology  [facility]    Dermatology Surgery Clinic  [facility]  [address] [address]

## 2021-04-06 NOTE — PROGRESS NOTES
[facility] Dermatologic Surgery Clinic [place] Procedure Note    Date of Service:  Apr 6, 2021  Surgery: Mohs micrographic surgery    Case 1  Repair Type: Complex linear repair   Repair Size: 6.0 cm  Suture Material: 4-0 Monocryl   Tumor Type: BCC  Location: left chest  Derm-Path Accession #: [id number]  PreOp Size: 2.1 x 1.8 cm  PostOp Size: 3.7 3.2 cm  Mohs Accession #:   Level of Defect: Fascia       Procedure:  We discussed the principles of treatment and most likely complications including scarring, bleeding, infection, swelling, pain, crusting, nerve damage, large wound,  incomplete excision, wound dehiscence,  nerve damage, recurrence, and a second procedure may be recommended to obtain the best cosmetic or functional result.    Informed consent was obtained and the patient underwent the procedure as follows:  The patient was placed supine on the operating table.  The cancer was identified, outlined with a marker, and verified by the patient.  The entire surgical field was prepped with chlorhexidine.  The surgical site was anesthetized using 1% lidocaine with epinephrine.      The area of clinically apparent tumor was debulked. The layer of tissue was then surgically excised using a #15 blade and was then transferred onto a specimen sheet maintaining the orientation of the specimen. Hemostasis was obtained using electrocoagulation. The wound site was then covered with a dressing while the tissue samples were processed for examination.    The excised tissue was transported to the Mohs histology laboratory maintaining the tissue orientation.  The tissue specimen was relaxed so that the entire surgical margin was in a a single horizontal plane for sectioning and inked for precise mapping.  A precise reference map was drawn to reflect the sectioning of the specimen, colored inking of the margins, and orientation on the patient.  The tissue was processed using horizontal sectioning of the base and  continuous peripheral margins.  The histopathologic sections were reviewed in conjunction with the reference map.    Total blocks: 2    Total slides:  6    Residual tumor was identified and indicated in red on the reference map, identifying the location where further tissue excision was necessary. Therefore, an additional stage of Mohs Micrographic surgery was deemed necessary.     Stage II   The patient was returned to the operating room, and the area prepped in the usual manner. The residual tumor was excised using the reference map as a guide. The specimen was transfered to a labeled specimen sheet maintaining the orientation of the specimen. Hemostasis was obtained and the wound site was covered with a dressing while the tissue was processed for examination.     The excised tissue was transported to the Mohs histology laboratory maintaining orientation. The specimen margins were inked for precise mapping and a reference map was prepared for the is additional stage to maintain precise orientation as described above. The tissue was processed using horizontal sectioning of the base and continuous peripheral margins. The histopathologic sections were reviewed in conjunction with the reference map.     Total blocks: 1    Total slides:  5    There were no cancer cells visualized on examination, therefore Mohs surgery was complete.     REPAIR:    A complex layered linear closure was selected as the procedure which would maximally preserve both function and cosmesis and for the following reasons: 1) the defect was widely undermined and 2) wound size, depth, tension, and location.     After the excision of the tumor, the area was extensively and carefully undermined using blunt Metzenbaum scissors. Hemostasis was obtained with spot electrocautery and ligation of vessels where necessary.  An initial deep plication sutures of 4-0 Monocryl sutures  were placed in the deep, subcutaneous and fascial planes to appose the  lateral margins.  The subcutaneous and dermal layers were then closed with 4-0 Monocryl sutures. The epidermis was then carefully approximated along the length of the wound using 4-0 monocryl subcuticular running sutures.     Estimated blood loss was less than 10 ml for all surgical sites. A sterile pressure dressing was applied and wound care instructions, with a written handout, were given. The patient was discharged from the Dermatologic Surgery Center alert and ambulatory.    Dr. Brown performed the micrographic surgery and Dr. Michaels performed the reconstruction.     Russell Brown MD      DERMATOLOGY EXCISION PROCEDURE NOTE    Dermatology Problem List:  1. Digital mucous cyst, 3rd L finger  - s/p excision 4/6/21    NAME OF PROCEDURE: Excision intermediate layered linear closure  Staff surgeon: Dr Michaels  Resident: Dr Cheng  Scrub Nurse: Rosamaria    PRE-OPERATIVE DIAGNOSIS:  Digital mucous cyst  POST-OPERATIVE DIAGNOSIS: Same   LOCATION: L middle 3rd finger  FINAL EXCISION SIZE(DEFECT SIZE):  0.7 x 1.1 cm  MARGIN: 1mm  FINAL REPAIR LENGTH: 1 cm   ANESTHESIA: 1% lidocaine with 1:100,000 epinephrine           INDICATIONS: This patient presented with a 0.5x0.9 cm digital mucous cystt. Excision was indicated. We discussed the principles of treatment and most likely complications including scarring, bleeding, infection, incomplete excision, wound dehiscence, pain, nerve damage, and recurrence. Informed consent was obtained and the patient underwent the procedure as follows:    PROCEDURE: The patient was taken to the operative suite. Time-out was performed.  The treatment area was anesthetized with 1% lidocaine with epinephrine. The area was prepped with Chlorhexidine and rinsed with sterile saline and draped with sterile towels. The lesion was delineated and excised down to subcutaneous fat in a elliptical manner. Hemostasis was obtained by electrocoagulation.     REPAIR: An intermediate layered linear closure was  selected as the procedure which would maximally preserve both function and cosmesis.    After the excision of the tumor, hemostasis was obtained with electrocoagulation.  Closure was oriented so that the wound was in the patient's natural skin tension lines. The subcutaneous and dermal layers were then closed with 4-0 Monocryl sutures. The epidermis was then carefully approximated along the length of the wound using  4-0 fast absorbing gut simple running sutures.     Estimated blood loss was less than 10 ml for all surgical sites. A sterile pressure dressing was applied and wound care instructions, with a written handout, were given. The patient was discharged from the Dermatologic Surgery Center alert and ambulatory.     The patient elected for pathology results to automatically release and understands that the clinical staff will contact them as soon as possible to notify them of the results.     Follow-up in 2-3 weeks for virtual visit.    Dr. Michaels was immediately available for the entire surgery and was physicially present for the key portions of the procedure.    Anatomic Pathology Results: pending    Clinical Follow-Up: 2-3 weeks for virtual visit.    Staff Involved:  Dr Michaels  Resident: Dr Cheng      Attending attestation:  I was present for key elements of the procedure and immediately available for all other portions of the procedure.  I have reviewed the note and edited it as necessary.    Dale Michaels M.D.  Professor  Director of Dermatologic Surgery  Department of Dermatology  [facility]    Dermatology Surgery Clinic  [facility] [place] [id number]

## 2021-04-06 NOTE — NURSING NOTE
Chief Complaint   Patient presents with     Derm Problem     Patient is here today for mohs on left chest and cyst.      Ebony AC CMA

## 2021-04-06 NOTE — PATIENT INSTRUCTIONS

## 2021-04-07 DIAGNOSIS — M54.2 NECK PAIN: ICD-10-CM

## 2021-04-07 DIAGNOSIS — G44.209 TENSION HEADACHE: ICD-10-CM

## 2021-04-09 RX ORDER — BUTALBITAL, ACETAMINOPHEN AND CAFFEINE 50; 325; 40 MG/1; MG/1; MG/1
1 TABLET ORAL EVERY 4 HOURS PRN
Qty: 15 TABLET | Refills: 0 | Status: SHIPPED | OUTPATIENT
Start: 2021-04-09 | End: 2021-04-21

## 2021-04-09 NOTE — TELEPHONE ENCOUNTER
Butalbital-APAP-Caffeine -40 MG Oral Tablet  Last Written Prescription Date:  3/12/2021  Last Fill Quantity: 15,   # refills: 0  Last Office Visit : 9/11/2020  Future Office visit:  None    Routing refill request to provider for review/approval because:  Drug not on the [facility], [facility] or [facility] refill protocol or controlled substance      Aylin Mathews RN  Central Triage Red Flags/Med Refills

## 2021-04-11 LAB — COPATH REPORT: NORMAL

## 2021-04-14 ENCOUNTER — TELEPHONE (OUTPATIENT)
Dept: DERMATOLOGY | Facility: CLINIC | Age: 61
End: 2021-04-14

## 2021-04-14 NOTE — TELEPHONE ENCOUNTER
[facility]    Phone Message    May a detailed message be left on voicemail: yes     Reason for Call: Requesting Results   Name/type of test: NA  Date of test: 04/06  Was test done at a location other than [facility] (Please fill in the location if not [facility])?: No  Pt received a call on message. Pt is unsure what the call is for . Pt thinks about 04/09 Bx. Please call Pt back to discuss, thank you.      Action Taken: Message routed to:  [facility] ([facility]): Derm    Travel Screening: Not Applicable

## 2021-04-16 NOTE — TELEPHONE ENCOUNTER
Called and gave results. No concerns. Pt said he wants to keep his telephone visit, but [facility] Pt refused in person visit as an alternative.

## 2021-04-20 ENCOUNTER — VIRTUAL VISIT (OUTPATIENT)
Dept: DERMATOLOGY | Facility: CLINIC | Age: 61
End: 2021-04-20
Payer: MEDICARE

## 2021-04-20 DIAGNOSIS — C44.519 BASAL CELL CARCINOMA (BCC) OF SKIN OF OTHER PART OF TORSO: Primary | ICD-10-CM

## 2021-04-20 DIAGNOSIS — M54.2 NECK PAIN: ICD-10-CM

## 2021-04-20 DIAGNOSIS — G44.209 TENSION HEADACHE: ICD-10-CM

## 2021-04-20 PROCEDURE — 99024 POSTOP FOLLOW-UP VISIT: CPT | Mod: 95 | Performed by: DERMATOLOGY

## 2021-04-20 ASSESSMENT — PAIN SCALES - GENERAL: PAINLEVEL: NO PAIN (0)

## 2021-04-20 NOTE — PATIENT INSTRUCTIONS
[facility] Dermatology Visit    Thank you for allowing us to participate in your care. Your findings, instructions and follow-up plan are as follows:         When should I call my doctor?    If you are worsening or not improving, please, contact us or seek urgent care as noted below.     Who should I call with questions (adults)?    [facility] (adult and pediatric): [phone]     [facility] (adult): [phone]    For urgent needs outside of business hours call the [facility] at [phone] and ask for the dermatology resident on call    If this is a medical emergency and you are unable to reach an ER, Call 911      Who should I call with questions (pediatric)?  [facility]- Pediatric Dermatology  Dr. Celina Hopkins, Dr. Renzo Larkin, Dr. Ngoc Pruett, Jocelyne Lopez, PA  Dr. Zoila London, Dr. Dana Yates & Dr. Marlon Lynch  Non Urgent  Nurse Triage Line; [phone]- Joy and Kenzie RN Care Coordinators   Isabell (/Complex ) [phone]    If you need a prescription refill, please contact your pharmacy. Refills are approved or denied by our Physicians during normal business hours, Monday through Fridays  Per office policy, refills will not be granted if you have not been seen within the past year (or sooner depending on your child's condition)    Scheduling Information:  Pediatric Appointment Scheduling and Call Center [phone]  Radiology Scheduling- [phone]  Sedation Unit Scheduling- [phone]  [place] Scheduling- General [phone]; Pediatric Dermatology [phone]  Main  Services: [phone]  [place]: [phone]  Citizen of [place]: [phone]  Hmong/Greek/Romansh: [phone]  Preadmission Nursing Department Fax Number: [phone] (Fax all pre-operative paperwork to this number)    For urgent matters arising during evenings,  weekends, or holidays that cannot wait for normal business hours please call [phone] and ask for the Dermatology Resident On-Call to be paged.

## 2021-04-20 NOTE — TELEPHONE ENCOUNTER
[facility] Call Center    Phone Message    May a detailed message be left on voicemail: yes     Reason for Call: Medication Refill Request    Has the patient contacted the pharmacy for the refill? Yes   Name of medication being requested: butalbital-acetaminophen-caffeine (ESGIC) -40 MG tablet  Provider who prescribed the medication: Dr. Guzman  Pharmacy:    [facility] [address] [place] [address]    Date medication is needed: ASAP - Patient is out of refills. Please call patient if there's any questions..          Action Taken: Message routed to:  [facility] PCC    Travel Screening: Not Applicable

## 2021-04-20 NOTE — NURSING NOTE
Lul OSORIO Flako's goals for this visit include:   Chief Complaint   Patient presents with     Derm Problem     Lul is following up on fingers and his chest, stitches on fingers have not dissolved        He requests these members of his care team be copied on today's visit information:     PCP: Donald Lorenz    Referring Provider:  Dale Michaels MD  [address] [place] [phone]    There were no vitals taken for this visit.    Do you need any medication refills at today's visit? No    Akua Griggs LPN

## 2021-04-20 NOTE — PROGRESS NOTES
Patient presents for follow up.    Sutures haveen't disolved fully.  Otherwise doing well.  Discussed firmer cleaning with peroxide.  Call if not gone in 2-3 days and we will bring into office for removal.      Dale Michaels MD

## 2021-04-20 NOTE — LETTER
4/20/2021       RE: Lul Arriola  [address]  [facility] [address]     Dear Colleague,    Thank you for referring your patient, Lul Arriola, to the [facility] DERMATOLOGIC SURGERY CLINIC [place] at [facility]. Please see a copy of my visit note below.    [facility] Dermatology Note  Encounter Date: Apr 20, 2021  Store-and-Forward and Telephone ([phone]). Location of teledermatologist: [facility] DERMATOLOGIC SURGERY CLINIC [place].  Start time: ***. End time: ***.    Dermatology Problem List:  1. ***    ____________________________________________    Assessment & Plan:     # {Diagnosesderm:500118}.   {kkplans:479115}   - ***     # {Diagnosesderm:827919}.   {kkplans:398467}   - ***     Procedures Performed:    None    Follow-up: {kkfollowup:452831}    {kkstaffinvolved:269596}    ***  ____________________________________________    CC: Derm Problem (Lul is following up on fingers and his chest, stitches on fingers have not dissolved )    HPI:  Mr. Lul Arriola is a(n) 60 year old male who presents today {kknew/return:364782} for ***    Patient is otherwise feeling well, without additional skin concerns.    Labs Reviewed:  ***N/A    Physical Exam:  Vitals: There were no vitals taken for this visit.  SKIN: Teledermatology photos were reviewed; image quality and interpretability: ***acceptable. Image date: ***.  - ***  - No other lesions of concern on areas examined.     Medications:  Current Outpatient Medications   Medication     ASPIRIN PO     Aspirin-Acetaminophen-Caffeine (EXCEDRIN PO)     butalbital-acetaminophen-caffeine (ESGIC) -40 MG tablet     carvedilol (COREG) 12.5 MG tablet     cyclobenzaprine (FLEXERIL) 10 MG tablet     diazepam (VALIUM) 10 MG tablet     famotidine (PEPCID) 40 MG tablet     furosemide (LASIX) 20 MG tablet     ketoconazole (NIZORAL) 2 % external cream      lisinopril (ZESTRIL) 5 MG tablet     MOTRIN IB PO     potassium chloride ER (K-TAB/KLOR-CON) 10 MEQ CR tablet     acyclovir (ZOVIRAX) 200 MG capsule     fluticasone (FLONASE) 50 MCG/ACT nasal spray     sildenafil (VIAGRA) 100 MG tablet     No current facility-administered medications for this visit.       Past Medical/Surgical History:   Patient Active Problem List   Diagnosis     Anxiety state     Dysthymia     Cardiomyopathy, nonischemic (H)     Past Medical History:   Diagnosis Date     Cardiomyopathy, nonischemic (H) 3/23/2018       CC Dale Michaels MD  [address] [place] [phone] on close of this encounter.

## 2021-04-21 RX ORDER — BUTALBITAL, ACETAMINOPHEN AND CAFFEINE 50; 325; 40 MG/1; MG/1; MG/1
1 TABLET ORAL EVERY 4 HOURS PRN
Qty: 15 TABLET | Refills: 0 | Status: SHIPPED | OUTPATIENT
Start: 2021-04-21 | End: 2021-05-18

## 2021-04-21 RX ORDER — BUTALBITAL, ACETAMINOPHEN AND CAFFEINE 50; 325; 40 MG/1; MG/1; MG/1
1 TABLET ORAL EVERY 4 HOURS PRN
Qty: 15 TABLET | Refills: 0 | Status: SHIPPED | OUTPATIENT
Start: 2021-04-21 | End: 2021-04-21

## 2021-04-21 NOTE — TELEPHONE ENCOUNTER
butalbital-acetaminophen-caffeine was approved on 4/9/21, but it was failed to send via [address] [employer], should be resent.

## 2021-05-08 ENCOUNTER — HEALTH MAINTENANCE LETTER (OUTPATIENT)
Age: 61
End: 2021-05-08

## 2021-05-13 DIAGNOSIS — I50.22 CHRONIC SYSTOLIC CONGESTIVE HEART FAILURE (H): ICD-10-CM

## 2021-05-13 DIAGNOSIS — I10 BENIGN ESSENTIAL HYPERTENSION: ICD-10-CM

## 2021-05-15 DIAGNOSIS — G44.209 TENSION HEADACHE: ICD-10-CM

## 2021-05-15 DIAGNOSIS — M54.2 NECK PAIN: ICD-10-CM

## 2021-05-17 RX ORDER — FUROSEMIDE 20 MG
TABLET ORAL
Qty: 90 TABLET | Refills: 0 | Status: SHIPPED | OUTPATIENT
Start: 2021-05-17 | End: 2021-10-20

## 2021-05-17 RX ORDER — LISINOPRIL 5 MG/1
TABLET ORAL
Qty: 180 TABLET | Refills: 0 | Status: SHIPPED | OUTPATIENT
Start: 2021-05-17 | End: 2021-09-02

## 2021-05-17 NOTE — TELEPHONE ENCOUNTER
11/4/2020  [facility]  RTC:?-  pt will call  BP above protocol parameter- FYI to clinic  RF 90 day    BP Readings from Last 3 Encounters:   04/06/21 (!) 150/81   11/04/20 136/81   09/11/20 131/80

## 2021-05-18 RX ORDER — BUTALBITAL, ACETAMINOPHEN AND CAFFEINE 50; 325; 40 MG/1; MG/1; MG/1
1 TABLET ORAL EVERY 4 HOURS PRN
Qty: 15 TABLET | Refills: 0 | Status: SHIPPED | OUTPATIENT
Start: 2021-05-18 | End: 2021-06-17

## 2021-05-18 NOTE — TELEPHONE ENCOUNTER
Patient Requested  butalbital-acetaminophen-caffeine (ESGIC) -40 MG tablet  Last Filled  04/21/2021  Last Office Visit  09/11/2020  Next Office Visit     Checked  05/18/2021    DX:     Pharmacy:     CHIARA MCCANN CMA at 8:17 AM on 5/18/2021.

## 2021-06-16 DIAGNOSIS — M54.2 NECK PAIN: ICD-10-CM

## 2021-06-16 DIAGNOSIS — G44.209 TENSION HEADACHE: ICD-10-CM

## 2021-06-17 RX ORDER — BUTALBITAL, ACETAMINOPHEN AND CAFFEINE 50; 325; 40 MG/1; MG/1; MG/1
1 TABLET ORAL EVERY 4 HOURS PRN
Qty: 15 TABLET | Refills: 0 | Status: SHIPPED | OUTPATIENT
Start: 2021-06-17 | End: 2021-07-15

## 2021-06-17 NOTE — TELEPHONE ENCOUNTER
Patient Requested  butalbital-acetaminophen-caffeine (ESGIC) -40 MG tablet  Last Filled  05/18/2021  Last Office Visit  09/11/2020  Next Office Visit     Checked  06/17/2021    DX:     Pharmacy:     CHIARA MCCANN CMA at 7:14 AM on 6/17/2021.

## 2021-07-15 DIAGNOSIS — G44.209 TENSION HEADACHE: ICD-10-CM

## 2021-07-15 DIAGNOSIS — M54.2 NECK PAIN: ICD-10-CM

## 2021-07-15 NOTE — TELEPHONE ENCOUNTER
Butalbital-APAP-Caffeine -40 MG Oral Tablet  Last Written Prescription Date:  6/17/2021  Last Fill Quantity: 15,   # refills: 0  Last Office Visit :  9/11/2020  Future Office visit:  None    Routing refill request to provider for review/approval because:  Drug not on the [facility], P or [facility] refill protocol or controlled substance      Aylin Mathews RN  Central Triage Red Flags/Med Refills

## 2021-07-20 RX ORDER — BUTALBITAL, ACETAMINOPHEN AND CAFFEINE 50; 325; 40 MG/1; MG/1; MG/1
1 TABLET ORAL EVERY 4 HOURS PRN
Qty: 30 TABLET | Refills: 1 | Status: SHIPPED | OUTPATIENT
Start: 2021-07-20 | End: 2021-09-17

## 2021-08-30 DIAGNOSIS — I10 BENIGN ESSENTIAL HYPERTENSION: ICD-10-CM

## 2021-08-30 DIAGNOSIS — I50.22 CHRONIC SYSTOLIC CONGESTIVE HEART FAILURE (H): ICD-10-CM

## 2021-09-01 ENCOUNTER — TELEPHONE (OUTPATIENT)
Dept: CARDIOLOGY | Facility: CLINIC | Age: 61
End: 2021-09-01

## 2021-09-01 NOTE — TELEPHONE ENCOUNTER
Health Call Center    Phone Message    May a detailed message be left on voicemail: yes     Reason for Call: Other: Lul calling stating he requested a refill on 8/30 but still hasn't heard back.  He also asked about getting all three meds lined up so they all are due at same time for refills.  He listed the lisiopril is the one he only has a few pills left that needs refill currently.  Furosemide and lisinopril have zero refills left but has enough of those for now. Please call and discuss. Thank you!    Action Taken: Message routed to:  [facility] Cardio    Travel Screening: Not Applicable

## 2021-09-02 RX ORDER — LISINOPRIL 5 MG/1
5 TABLET ORAL 2 TIMES DAILY
Qty: 180 TABLET | Refills: 3 | Status: SHIPPED | OUTPATIENT
Start: 2021-09-02 | End: 2021-11-10

## 2021-09-02 NOTE — TELEPHONE ENCOUNTER
"Lisinopril refill sent.   Left vm letting him know.  Said that I am unaware of any way for us to \"line up\" his refills and advised he ask the pharmacy if they have any ideas involving dispensing more or less to have it come out the way he wants.  Suggested he send a mychart if he needs anything else.   "

## 2021-09-02 NOTE — TELEPHONE ENCOUNTER
lisinopril (ZESTRIL) 5 MG tablet    TAKE ONE TABLET BY MOUTH TWICE A DAY    Last Written Prescription Date:  5/17/21  Last Fill Quantity: 180,   # refills: 0  Last Office Visit : 11/4/20  Future Office visit:  none    Routing  because:  BP> 140/90  04/06/21 (!) 150/81     2nd requests. BP > 140/90. Pended.

## 2021-09-16 DIAGNOSIS — K21.9 GASTROESOPHAGEAL REFLUX DISEASE WITHOUT ESOPHAGITIS: ICD-10-CM

## 2021-09-17 ENCOUNTER — LAB (OUTPATIENT)
Dept: LAB | Facility: CLINIC | Age: 61
End: 2021-09-17
Payer: MEDICARE

## 2021-09-17 ENCOUNTER — OFFICE VISIT (OUTPATIENT)
Dept: FAMILY MEDICINE | Facility: CLINIC | Age: 61
End: 2021-09-17
Payer: MEDICARE

## 2021-09-17 VITALS
OXYGEN SATURATION: 97 % | DIASTOLIC BLOOD PRESSURE: 83 MMHG | HEIGHT: 71 IN | SYSTOLIC BLOOD PRESSURE: 128 MMHG | BODY MASS INDEX: 28.14 KG/M2 | WEIGHT: 201 LBS | HEART RATE: 82 BPM

## 2021-09-17 DIAGNOSIS — M54.2 NECK PAIN: ICD-10-CM

## 2021-09-17 DIAGNOSIS — I42.8 CARDIOMYOPATHY, NONISCHEMIC (H): ICD-10-CM

## 2021-09-17 DIAGNOSIS — L21.9 SEBORRHEIC DERMATITIS: ICD-10-CM

## 2021-09-17 DIAGNOSIS — Z12.5 ENCOUNTER FOR SCREENING FOR MALIGNANT NEOPLASM OF PROSTATE: ICD-10-CM

## 2021-09-17 DIAGNOSIS — Z00.00 HEALTH CARE MAINTENANCE: ICD-10-CM

## 2021-09-17 DIAGNOSIS — J30.89 SEASONAL ALLERGIC RHINITIS DUE TO OTHER ALLERGIC TRIGGER: ICD-10-CM

## 2021-09-17 DIAGNOSIS — I42.8 CARDIOMYOPATHY, NONISCHEMIC (H): Primary | ICD-10-CM

## 2021-09-17 DIAGNOSIS — R21 RASH: ICD-10-CM

## 2021-09-17 DIAGNOSIS — H92.03 OTALGIA, BILATERAL: ICD-10-CM

## 2021-09-17 DIAGNOSIS — N52.8 OTHER MALE ERECTILE DYSFUNCTION: ICD-10-CM

## 2021-09-17 DIAGNOSIS — F41.1 ANXIETY STATE: ICD-10-CM

## 2021-09-17 DIAGNOSIS — B00.9 HSV (HERPES SIMPLEX VIRUS) INFECTION: ICD-10-CM

## 2021-09-17 DIAGNOSIS — K21.9 GASTROESOPHAGEAL REFLUX DISEASE WITHOUT ESOPHAGITIS: ICD-10-CM

## 2021-09-17 DIAGNOSIS — N40.1 BENIGN LOCALIZED PROSTATIC HYPERPLASIA WITH LOWER URINARY TRACT SYMPTOMS (LUTS): ICD-10-CM

## 2021-09-17 DIAGNOSIS — I10 BENIGN ESSENTIAL HYPERTENSION: ICD-10-CM

## 2021-09-17 DIAGNOSIS — G44.209 TENSION HEADACHE: ICD-10-CM

## 2021-09-17 LAB
ALBUMIN SERPL-MCNC: 3.6 G/DL (ref 3.4–5)
ALP SERPL-CCNC: 92 U/L (ref 40–150)
ALT SERPL W P-5'-P-CCNC: 36 U/L (ref 0–70)
ANION GAP SERPL CALCULATED.3IONS-SCNC: 8 MMOL/L (ref 3–14)
AST SERPL W P-5'-P-CCNC: 25 U/L (ref 0–45)
BASOPHILS # BLD AUTO: 0.1 10E3/UL (ref 0–0.2)
BASOPHILS NFR BLD AUTO: 1 %
BILIRUB SERPL-MCNC: 0.6 MG/DL (ref 0.2–1.3)
BUN SERPL-MCNC: 12 MG/DL (ref 7–30)
CALCIUM SERPL-MCNC: 9 MG/DL (ref 8.5–10.1)
CHLORIDE BLD-SCNC: 106 MMOL/L (ref 94–109)
CHOLEST SERPL-MCNC: 266 MG/DL
CO2 SERPL-SCNC: 24 MMOL/L (ref 20–32)
CREAT SERPL-MCNC: 1.02 MG/DL (ref 0.66–1.25)
EOSINOPHIL # BLD AUTO: 0.4 10E3/UL (ref 0–0.7)
EOSINOPHIL NFR BLD AUTO: 5 %
ERYTHROCYTE [DISTWIDTH] IN BLOOD BY AUTOMATED COUNT: 12.5 % (ref 10–15)
FASTING STATUS PATIENT QL REPORTED: YES
GFR SERPL CREATININE-BSD FRML MDRD: 80 ML/MIN/1.73M2
GLUCOSE BLD-MCNC: 117 MG/DL (ref 70–99)
HCT VFR BLD AUTO: 42.5 % (ref 40–53)
HDLC SERPL-MCNC: 42 MG/DL
HGB BLD-MCNC: 13.9 G/DL (ref 13.3–17.7)
IMM GRANULOCYTES # BLD: 0 10E3/UL
IMM GRANULOCYTES NFR BLD: 0 %
LDLC SERPL CALC-MCNC: 129 MG/DL
LDLC SERPL CALC-MCNC: ABNORMAL MG/DL
LYMPHOCYTES # BLD AUTO: 2.4 10E3/UL (ref 0.8–5.3)
LYMPHOCYTES NFR BLD AUTO: 29 %
MCH RBC QN AUTO: 30.3 PG (ref 26.5–33)
MCHC RBC AUTO-ENTMCNC: 32.7 G/DL (ref 31.5–36.5)
MCV RBC AUTO: 93 FL (ref 78–100)
MONOCYTES # BLD AUTO: 0.6 10E3/UL (ref 0–1.3)
MONOCYTES NFR BLD AUTO: 7 %
NEUTROPHILS # BLD AUTO: 4.9 10E3/UL (ref 1.6–8.3)
NEUTROPHILS NFR BLD AUTO: 58 %
NONHDLC SERPL-MCNC: 224 MG/DL
NRBC # BLD AUTO: 0 10E3/UL
NRBC BLD AUTO-RTO: 0 /100
PLATELET # BLD AUTO: 294 10E3/UL (ref 150–450)
POTASSIUM BLD-SCNC: 4 MMOL/L (ref 3.4–5.3)
PROT SERPL-MCNC: 7.9 G/DL (ref 6.8–8.8)
PSA SERPL-MCNC: 0.5 UG/L (ref 0–4)
RBC # BLD AUTO: 4.58 10E6/UL (ref 4.4–5.9)
SODIUM SERPL-SCNC: 138 MMOL/L (ref 133–144)
TRIGL SERPL-MCNC: 500 MG/DL
WBC # BLD AUTO: 8.5 10E3/UL (ref 4–11)

## 2021-09-17 PROCEDURE — G0103 PSA SCREENING: HCPCS | Performed by: PATHOLOGY

## 2021-09-17 PROCEDURE — 80053 COMPREHEN METABOLIC PANEL: CPT | Performed by: PATHOLOGY

## 2021-09-17 PROCEDURE — 99215 OFFICE O/P EST HI 40 MIN: CPT | Performed by: FAMILY MEDICINE

## 2021-09-17 PROCEDURE — 85025 COMPLETE CBC W/AUTO DIFF WBC: CPT | Performed by: PATHOLOGY

## 2021-09-17 PROCEDURE — 80061 LIPID PANEL: CPT | Performed by: PATHOLOGY

## 2021-09-17 PROCEDURE — 36415 COLL VENOUS BLD VENIPUNCTURE: CPT | Performed by: PATHOLOGY

## 2021-09-17 PROCEDURE — 83721 ASSAY OF BLOOD LIPOPROTEIN: CPT | Performed by: FAMILY MEDICINE

## 2021-09-17 RX ORDER — BENZOCAINE/MENTHOL 6 MG-10 MG
LOZENGE MUCOUS MEMBRANE 2 TIMES DAILY
Qty: 60 G | Refills: 3 | Status: SHIPPED | OUTPATIENT
Start: 2021-09-17

## 2021-09-17 RX ORDER — FAMOTIDINE 40 MG/1
40 TABLET, FILM COATED ORAL DAILY
Qty: 90 TABLET | Refills: 3 | Status: SHIPPED | OUTPATIENT
Start: 2021-09-17 | End: 2022-02-09

## 2021-09-17 RX ORDER — AMOXICILLIN 875 MG
875 TABLET ORAL 2 TIMES DAILY
Qty: 20 TABLET | Refills: 0 | Status: SHIPPED | OUTPATIENT
Start: 2021-09-17

## 2021-09-17 RX ORDER — KETOCONAZOLE 20 MG/G
CREAM TOPICAL 2 TIMES DAILY
Qty: 60 G | Refills: 1 | Status: SHIPPED | OUTPATIENT
Start: 2021-09-17 | End: 2021-09-17

## 2021-09-17 RX ORDER — TAMSULOSIN HYDROCHLORIDE 0.4 MG/1
0.4 CAPSULE ORAL DAILY
Qty: 90 CAPSULE | Refills: 3 | Status: SHIPPED | OUTPATIENT
Start: 2021-09-17 | End: 2022-10-19

## 2021-09-17 RX ORDER — DIAZEPAM 10 MG
10 TABLET ORAL EVERY 12 HOURS PRN
Qty: 60 TABLET | Refills: 3 | Status: SHIPPED | OUTPATIENT
Start: 2021-09-17 | End: 2022-02-09

## 2021-09-17 RX ORDER — BUTALBITAL, ACETAMINOPHEN AND CAFFEINE 50; 325; 40 MG/1; MG/1; MG/1
1 TABLET ORAL EVERY 4 HOURS PRN
Qty: 30 TABLET | Refills: 2 | Status: SHIPPED | OUTPATIENT
Start: 2021-09-17 | End: 2022-04-19

## 2021-09-17 RX ORDER — SILDENAFIL 100 MG/1
50-100 TABLET, FILM COATED ORAL DAILY PRN
Qty: 12 TABLET | Refills: 11 | Status: SHIPPED | OUTPATIENT
Start: 2021-09-17 | End: 2021-09-17

## 2021-09-17 RX ORDER — FLUTICASONE PROPIONATE 50 MCG
2 SPRAY, SUSPENSION (ML) NASAL 2 TIMES DAILY
Qty: 16 G | Refills: 3 | Status: SHIPPED | OUTPATIENT
Start: 2021-09-17 | End: 2021-09-17

## 2021-09-17 RX ORDER — AZITHROMYCIN 250 MG/1
TABLET, FILM COATED ORAL
Qty: 6 TABLET | Refills: 0 | Status: SHIPPED | OUTPATIENT
Start: 2021-09-17 | End: 2021-09-17

## 2021-09-17 RX ORDER — BUTALBITAL, ACETAMINOPHEN AND CAFFEINE 50; 325; 40 MG/1; MG/1; MG/1
1 TABLET ORAL EVERY 4 HOURS PRN
Qty: 30 TABLET | Refills: 1 | Status: SHIPPED | OUTPATIENT
Start: 2021-09-17 | End: 2021-09-17

## 2021-09-17 RX ORDER — CYCLOBENZAPRINE HCL 10 MG
10 TABLET ORAL 3 TIMES DAILY PRN
Qty: 90 TABLET | Refills: 5 | Status: SHIPPED | OUTPATIENT
Start: 2021-09-17 | End: 2022-02-09

## 2021-09-17 RX ORDER — FAMOTIDINE 40 MG/1
40 TABLET, FILM COATED ORAL DAILY
Qty: 90 TABLET | Refills: 3 | Status: SHIPPED | OUTPATIENT
Start: 2021-09-17 | End: 2021-09-17

## 2021-09-17 RX ORDER — ACYCLOVIR 200 MG/1
CAPSULE ORAL
Qty: 270 CAPSULE | Refills: 3 | Status: SHIPPED | OUTPATIENT
Start: 2021-09-17 | End: 2022-09-15

## 2021-09-17 ASSESSMENT — MIFFLIN-ST. JEOR: SCORE: 1748.86

## 2021-09-17 ASSESSMENT — PAIN SCALES - GENERAL: PAINLEVEL: NO PAIN (0)

## 2021-09-17 NOTE — PROGRESS NOTES
Assessment & Plan     Cardiomyopathy, nonischemic (H)  Due, I encourge autumn cardio visit  - Adult Cardiology Eval Referral  - CBC with platelets differential; Future  - Comprehensive metabolic panel; Future  - Lipid panel reflex to direct LDL Fasting; Future    Anxiety state  He's weaning, refill,  rvwd few rx's lately  - diazepam (VALIUM) 10 MG tablet; Take 1 tablet (10 mg) by mouth every 12 hours as needed for anxiety    HSV (herpes simplex virus) infection  Daily helps  - acyclovir (ZOVIRAX) 200 MG capsule; Take one po tid    Neck pain  Using esgic less often too  - cyclobenzaprine (FLEXERIL) 10 MG tablet; Take 1 tablet (10 mg) by mouth 3 times daily as needed for muscle spasms  - butalbital-acetaminophen-caffeine (ESGIC) -40 MG tablet; Take 1 tablet by mouth every 4 hours as needed for pain    Tension headache  Same. Does help, tolerated, used for decades, no red flags.  - butalbital-acetaminophen-caffeine (ESGIC) -40 MG tablet; Take 1 tablet by mouth every 4 hours as needed for pain    Gastroesophageal reflux disease without esophagitis  Helps  - famotidine (PEPCID) 40 MG tablet; Take 1 tablet (40 mg) by mouth daily    Seasonal allergic rhinitis due to other allergic trigger  Stable w/o meds    Benign essential hypertension  Due  - Lipid panel reflex to direct LDL Fasting; Future    Seborrheic dermatitis  Try sterod cream    Other male erectile dysfunction  Declines need     Rash  Try this instead of ketoconazole not helping anymore  - hydrocortisone (CORTAID) 1 % external cream; Apply topically 2 times daily    Health care maintenance  Due  - PSA screen; Future    Encounter for screening for malignant neoplasm of prostate   Due  - PSA screen; Future    Otalgia, bilateral  Try call prn  - amoxicillin (AMOXIL) 875 MG tablet; Take 1 tablet (875 mg) by mouth 2 times daily    Benign localized prostatic hyperplasia with lower urinary tract symptoms (LUTS)  More sx slowly  - tamsulosin (FLOMAX)  "0.4 MG capsule; Take 1 capsule (0.4 mg) by mouth daily      55 minutes spent on the date of the encounter doing chart review, history and exam, documentation and further activities per the note    BMI:   Estimated body mass index is 28.03 kg/m  as calculated from the following:    Height as of this encounter: 1.803 m (5' 11\").    Weight as of this encounter: 91.2 kg (201 lb).     Return in about 1 year (around 9/17/2022).    Donald Lorenz MD  [facility]IMARY [facility]ARE [facility] [place]    Liz Mccarty is a 60 year old who presents for the following health issues    HPI   Next colon test 2028  Shots: declines all, discussed  Mild CHF sx; man, knows due to see cardio will make own appt  Not worse    Gradually slower stream, would like to try something    Dry eyes sees [facility] MD given drops    Some ear congestion/pain several months    Overall stable     rvwd. Reduing med use on his own of controled substances, he might be able to stop Valium he's trying to reduce at most one/day now            Past Medical History:   Diagnosis Date     Cardiomyopathy, nonischemic (H) 3/23/2018     No past surgical history on file.  Current Outpatient Medications   Medication     acyclovir (ZOVIRAX) 200 MG capsule     ASPIRIN PO     Aspirin-Acetaminophen-Caffeine (EXCEDRIN PO)     butalbital-acetaminophen-caffeine (ESGIC) -40 MG tablet     carvedilol (COREG) 12.5 MG tablet     cyclobenzaprine (FLEXERIL) 10 MG tablet     diazepam (VALIUM) 10 MG tablet     famotidine (PEPCID) 40 MG tablet     fluticasone (FLONASE) 50 MCG/ACT nasal spray     furosemide (LASIX) 20 MG tablet     ketoconazole (NIZORAL) 2 % external cream     lisinopril (ZESTRIL) 5 MG tablet     MOTRIN IB PO     potassium chloride ER (K-TAB/KLOR-CON) 10 MEQ CR tablet     sildenafil (VIAGRA) 100 MG tablet     No current facility-administered medications for this visit.     Allergies   Allergen Reactions     Ultram [Tramadol] Nausea     " Baclofen      Imitrex [Sumatriptan] Nausea     Ivp Dye [Contrast Dye] Nausea and Vomiting     N & V, eyes, nose, throat swelled as a child     Keflex [Cephalexin Hcl]      Remeron Soltab      Valtrex [Valacyclovir] Other (See Comments)     Stomach pains     Celebrex [Celecoxib] Palpitations   Dad  around 80 heart issue, mom at 83 heart issue too, one brother alive/well except mental illness   No family history on file.  Social History     Socioeconomic History     Marital status: Single     Spouse name: Not on file     Number of children: Not on file     Years of education: Not on file     Highest education level: Not on file   Occupational History     Not on file   Tobacco Use     Smoking status: Former Smoker     Packs/day: 0.50     Years: 30.00     Pack years: 15.00     Types: Cigarettes     Smokeless tobacco: Never Used   Substance and Sexual Activity     Alcohol use: No     Alcohol/week: 0.0 standard drinks     Drug use: No     Sexual activity: Not on file   Other Topics Concern     Not on file   Social History Narrative     Not on file     Social Determinants of Health     Financial Resource Strain:      Difficulty of Paying Living Expenses:    Food Insecurity:      Worried About Running Out of Food in the Last Year:      Ran Out of Food in the Last Year:    Transportation Needs:      Lack of Transportation (Medical):      Lack of Transportation (Non-Medical):    Physical Activity:      Days of Exercise per Week:      Minutes of Exercise per Session:    Stress:      Feeling of Stress :    Social Connections:      Frequency of Communication with Friends and Family:      Frequency of Social Gatherings with Friends and Family:      Attends Scientology Services:      Active Member of Clubs or Organizations:      Attends Club or Organization Meetings:      Marital Status:    Intimate Partner Violence:      Fear of Current or Ex-Partner:      Emotionally Abused:      Physically Abused:      Sexually Abused:   "            Review of Systems   Ten pt ROS completed and otherwise negative      Objective    /83   Pulse 82   Ht 1.803 m (5' 11\")   Wt 91.2 kg (201 lb)   SpO2 97%   BMI 28.03 kg/m    Body mass index is 28.03 kg/m .  Physical Exam   GENERAL: healthy, alert and no distress  EYES: Eyes grossly normal to inspection, PERRL and conjunctivae and sclerae normal  HENT: ear canals and TM's normal, nose and mouth without ulcers or lesions  NECK: no adenopathy, no asymmetry, masses, or scars and thyroid normal to palpation  RESP: lungs clear to auscultation - no rales, rhonchi or wheezes  CV: regular rate and rhythm, normal S1 S2, no S3 or S4, no murmur, click or rub, no peripheral edema and peripheral pulses strong  ABDOMEN: soft, nontender, no hepatosplenomegaly, no masses and bowel sounds normal   (male): normal male genitalia without lesions or urethral discharge, no hernia  MS: no gross musculoskeletal defects noted, no edema  SKIN: no suspicious lesions or rashes  NEURO: Normal strength and tone, mentation intact and speech normal  PSYCH: mentation appears normal, affect normal/bright                "

## 2021-09-17 NOTE — NURSING NOTE
Chief Complaint   Patient presents with     Physical     Patient comes in for a physical exam.          Petros Chua MA on 9/17/2021 at 12:47 PM

## 2021-09-17 NOTE — TELEPHONE ENCOUNTER
FAMOTIDINE 40MG TABS  Last Written Prescription Date:  9/11/2020  Last Fill Quantity: 90,   # refills: 3  Last Office Visit : 9/11/2020  Future Office visit:  9/17/2021  90 Tabs, 3 Refills sent to pharm 9/17/2021      Aylin Mathews RN  Central Triage Red Flags/Med Refills

## 2021-09-21 ENCOUNTER — MYC MEDICAL ADVICE (OUTPATIENT)
Dept: INTERNAL MEDICINE | Facility: CLINIC | Age: 61
End: 2021-09-21
Payer: MEDICARE

## 2021-09-21 DIAGNOSIS — E78.5 HYPERLIPIDEMIA: Primary | ICD-10-CM

## 2021-09-21 NOTE — TELEPHONE ENCOUNTER
----- Message from Donald Lorenz MD sent at 9/17/2021  2:51 PM CDT -----  Can you call him  Lipids hi  Should consider Lipitor  If ok w/ that start 40 mg/day 90 pills three refills

## 2021-10-04 ENCOUNTER — CARE COORDINATION (OUTPATIENT)
Dept: CARDIOLOGY | Facility: CLINIC | Age: 61
End: 2021-10-04

## 2021-10-04 NOTE — PROGRESS NOTES
Patient was previous patient of Dr. Blanco and more recently had seen Dr. Reynolds last November.  Received referral from PCP for patient to follow up.  His last echo appears to be 2017 and at that time EF was 20-25%. Patient should establish care with heart fallure provider with echo prior.

## 2021-10-04 NOTE — PROGRESS NOTES
S: WQ referral by Donald Lorenz MD in [facility].    B: Enounter reviewed by HF Nurse. Patient has established with cardiology, he is being followed by Dr. Reynolds. Last OV was 11/2020.    A: Not sure if patient wants to follow with us.     Plan: Call pt and see if he is interested in making appointment.

## 2021-10-15 NOTE — PROGRESS NOTES
Patient left VM stating that he was not interested in making an appointment, all he wants is his medication refilled. Patient did not specify what medication he was wanting refill for.     I called maureen back and left him a VM encouraging him to make a follow-up appointment with Dr. Reynolds. I did leave the scheduling number for him.I also told him I would send a message to [facility] regarding refill.

## 2021-10-19 DIAGNOSIS — I10 BENIGN ESSENTIAL HYPERTENSION: ICD-10-CM

## 2021-10-20 RX ORDER — FUROSEMIDE 20 MG
20 TABLET ORAL DAILY
Qty: 30 TABLET | Refills: 0 | Status: SHIPPED | OUTPATIENT
Start: 2021-10-20 | End: 2021-11-10

## 2021-10-20 NOTE — TELEPHONE ENCOUNTER
Last Clinic Visit: 11/4/2020  [facility]  Future Visit: none    *per last visit- patient wants to wait to schedule and he will call us.

## 2021-10-23 ENCOUNTER — HEALTH MAINTENANCE LETTER (OUTPATIENT)
Age: 61
End: 2021-10-23

## 2021-10-29 NOTE — TELEPHONE ENCOUNTER
Pt did not check [employer] message yet. I left a message to the pt to call me back or respond to [employer].

## 2021-11-10 ENCOUNTER — VIRTUAL VISIT (OUTPATIENT)
Dept: CARDIOLOGY | Facility: CLINIC | Age: 61
End: 2021-11-10
Attending: INTERNAL MEDICINE
Payer: MEDICARE

## 2021-11-10 DIAGNOSIS — I50.22 CHRONIC SYSTOLIC CONGESTIVE HEART FAILURE (H): ICD-10-CM

## 2021-11-10 DIAGNOSIS — I10 BENIGN ESSENTIAL HYPERTENSION: ICD-10-CM

## 2021-11-10 DIAGNOSIS — I42.9 CARDIOMYOPATHY, UNSPECIFIED TYPE (H): ICD-10-CM

## 2021-11-10 PROCEDURE — 99443 PR PHYSICIAN TELEPHONE EVALUATION 21-30 MIN: CPT | Mod: 95 | Performed by: INTERNAL MEDICINE

## 2021-11-10 RX ORDER — CARVEDILOL 12.5 MG/1
12.5 TABLET ORAL 2 TIMES DAILY WITH MEALS
Qty: 180 TABLET | Refills: 3 | Status: SHIPPED | OUTPATIENT
Start: 2021-11-10 | End: 2022-10-25

## 2021-11-10 RX ORDER — LISINOPRIL 5 MG/1
5 TABLET ORAL 2 TIMES DAILY
Qty: 180 TABLET | Refills: 3 | Status: SHIPPED | OUTPATIENT
Start: 2021-11-10 | End: 2022-10-25

## 2021-11-10 RX ORDER — FUROSEMIDE 20 MG
20 TABLET ORAL DAILY
Qty: 90 TABLET | Refills: 3 | Status: SHIPPED | OUTPATIENT
Start: 2021-11-10 | End: 2022-10-25

## 2021-11-10 RX ORDER — POTASSIUM CHLORIDE 750 MG/1
10 TABLET, EXTENDED RELEASE ORAL DAILY
Qty: 90 TABLET | Refills: 3 | Status: SHIPPED | OUTPATIENT
Start: 2021-11-10 | End: 2022-10-25

## 2021-11-10 NOTE — PROGRESS NOTES
"Service Date: 11/10/2021    Donald Lorenz MD  Primary Care Center  [address] [place] [address], [address]  [place] [address]    RE:  Lul Arriola  MRN: [id number]  : 1960    Dear Dr. Lorenz:    It it was a pleasure participating in the care of your patient, Mr. Lul Arriola.  As you know, he is a 61-year-old gentleman who I spoke to over the phone today for a severe cardiomyopathy, hypertension and hyperlipidemia.    His past medical history is significant for the followin.  Hypertension.  2.  Hyperlipidemia.  3.  Anxiety.  4.  Dysthymia.  5.  Hodgkin lymphoma, status post chemotherapy and radiation.  6.  Depression/anxiety.  7.  Tobacco abuse.  8.  Osteoporosis.    His cardiac history is significant for severe cardiomyopathy, thought secondary to chemotherapy.  The patient has a history of Hodgkin disease diagnosed in , status post chemotherapy and radiation.    According to Dr. Blanco's as prior documentation, patient never had any angiogram or any type of ischemic workup due to the patient refusing it in the past.  He has also refused ICD placement in the past as well.    As consistent with prior visits, the patient's affect continues to be bitter and frustrated and borderline hostile in terms of his overall healthcare experienced.  He is quite negative in terms of his overall outlook on his health and continues to reiterate how his heart and lung function is \"shot\" after his chemotherapy treatment for his prior malignancy.    From a functional standpoint, the patient can walk through the store for 20-30 minutes and he does get mildly short of breath putting away groceries, but declines any edema.  He says his weight has been stable for the past 6 months at about 200 pounds.  He denies PND or orthopnea.  He denies sepideh chest pain and he denies palpitations, syncope or near syncope.    In terms of his present medications, he is takin.  Aspirin 81 mg a day.  2.  Carvedilol " 12.5 mg twice daily.  3.  Lasix 20 mg a day.  4.  Lisinopril 5 mg twice daily.  5.  Potassium 10 mEq a day.    PHYSICAL EXAMINATION:      VITAL SIGNS:  His last recorded blood pressure at home in the office was 128/83 with a pulse of 82.  His weight was 201 pounds.  GENERAL:  His decision-making capabilities are intact.  PSYCHIATRIC:  He is alert and oriented x3.  RESPIRATORY:  He is breathing comfortably without gross cough.    The remainder of the comprehensive physical exam was deferred secondary to the COVID-19 pandemic and secondary to telephone visit restrictions.    LABORATORY:  On 09/17/2021, his lipids were elevated.  LDL was not calculated.  Potassium 4.0.  GFR normal.    Echocardiogram 12/01/2017 reveals an ejection fraction in the 20%-25% range.  No significant valvular pathology identified.      IMPRESSION:      Lul is a 61-year-old gentleman who has several active issues:    1.  Severe nonischemic cardiomyopathy.    The patient's severe cardiomyopathy is thought secondary to chemotherapy in the past.    As you recall, he was a previous patient of Dr. Blanco, who confirmed that the patient never underwent an ischemic workup in the past due to refusing workup.  He has refused coronary angiography, ICD placement, coronary CTA and an even echocardiography at this point.      I offered him further workup again today and he again refused.  He did not want further ischemic workup or even an echocardiogram at this point in time and continues his negative perspective towards his overall general health.    The only thing he would agree to is a renewal of his medications today, and we have performed that for him.    2.  Hypertension.    He does not keep track of his blood pressures at home; however, his last check in the office on 09/17/21 appeared within reason. Continue to follow outpatient visit information.    3.  Hyperlipidemia.      His lipids are clearly out of control and a statin was offered today,  but declined.  Unfortunately, he simply refuses treatment.      PLAN:      1.  It is unfortunate that this person with a severe nonischemic cardiomyopathy has refused further workup and treatment and titration of his medicines once again.      However, it is certainly within his right to choose this quality of life that he has been accustomed to and elects to continue.  However, we will continue to offer him workup, studies and followup if he should change his mind.    He says that he will call us next time he is planning to visit the [place] and perhaps schedule an echo then, and he refuses to schedule a followup visit at this point in time as well.    Once again, it was a pleasure participating in the care of your patient, Mr. Mateo Goodman.  Please feel free to contact me at any time if any questions regarding his care in the future.    Sincerely,           Dalton Reynolds MD        D: 11/10/2021   T: 11/10/2021   MT: OCTAVIA    Name:     MATEO GOODMAN  MRN:      [id number]        Account:      [id number]   :      1960           Service Date: 11/10/2021       Document: [id number]

## 2021-11-10 NOTE — LETTER
"11/10/2021      RE: Lul Arriola  [address]  [facility] [address]       Dear Colleague,    Thank you for the opportunity to participate in the care of your patient, Lul Arriola, at the [facility]. Please see a copy of my visit note below.    Lul is a 61 year old who is being evaluated via a billable telephone visit.      What phone number would you like to be contacted at? [phone]  How would you like to obtain your AVS? Mail a copy    The patient has been notified of following:     \"This phone visit will be conducted via a call between you and your physician/provider. We have found that certain health care needs can be provided without the need for an in-person physical exam.  This service lets us provide the care you need with a phone conversation.  If a prescription is necessary we can send it directly to your pharmacy.  If lab work is needed we can place an order for that and you can then stop by our lab to have the test done at a later time.    Phone visits are billed at different rates depending on your insurance coverage.  Please reach out to your insurance provider with any questions.    If during the course of the call the physician/provider feels a phone visit is not appropriate, you will not be charged for this service.\"    Patient has given verbal consent for phone visit? Yes    How would you like to obtain your AVS? MyChart    Duration of call:14min    Total visit time (including visit time, charting, chart review, coordination of care, etc.=31min    See dictation #[id number]    CSN#:[id number]              Please do not hesitate to contact me if you have any questions/concerns.     Sincerely,     Dalton Reynolds MD    "

## 2021-11-10 NOTE — PATIENT INSTRUCTIONS
Per your discussion with Dr. Reynolds, follow up has been recommended and you have indicated that you will consider this and let us know if you wish to proceed.

## 2021-11-10 NOTE — LETTER
"11/10/2021      RE: Lul Arriola  [address]  [facility] [address]         Service Date: 11/10/2021    Donald Lorenz MD  Primary Care Center  [address] [place] [address], [address]  [place] [address]    RE:  Lul Arriola  MRN: [id number]  : 1960    Dear Dr. Lorenz:    It it was a pleasure participating in the care of your patient, . Lul Arriola.  As you know, he is a 61-year-old gentleman who I spoke to over the phone today for a severe cardiomyopathy, hypertension and hyperlipidemia.    His past medical history is significant for the followin.  Hypertension.  2.  Hyperlipidemia.  3.  Anxiety.  4.  Dysthymia.  5.  Hodgkin lymphoma, status post chemotherapy and radiation.  6.  Depression/anxiety.  7.  Tobacco abuse.  8.  Osteoporosis.    His cardiac history is significant for severe cardiomyopathy, thought secondary to chemotherapy.  The patient has a history of Hodgkin disease diagnosed in , status post chemotherapy and radiation.    According to Dr. Blanco's as prior documentation, patient never had any angiogram or any type of ischemic workup due to the patient refusing it in the past.  He has also refused ICD placement in the past as well.    As consistent with prior visits, the patient's affect continues to be bitter and frustrated and borderline hostile in terms of his overall healthcare experienced.  He is quite negative in terms of his overall outlook on his health and continues to reiterate how his heart and lung function is \"shot\" after his chemotherapy treatment for his prior malignancy.    From a functional standpoint, the patient can walk through the store for 20-30 minutes and he does get mildly short of breath putting away groceries, but declines any edema.  He says his weight has been stable for the past 6 months at about 200 pounds.  He denies PND or orthopnea.  He denies sepideh chest pain and he denies palpitations, syncope or near syncope.    In terms of " his present medications, he is takin.  Aspirin 81 mg a day.  2.  Carvedilol 12.5 mg twice daily.  3.  Lasix 20 mg a day.  4.  Lisinopril 5 mg twice daily.  5.  Potassium 10 mEq a day.    PHYSICAL EXAMINATION:      VITAL SIGNS:  His last recorded blood pressure at home in the office was 128/83 with a pulse of 82.  His weight was 201 pounds.  GENERAL:  His decision-making capabilities are intact.  PSYCHIATRIC:  He is alert and oriented x3.  RESPIRATORY:  He is breathing comfortably without gross cough.    The remainder of the comprehensive physical exam was deferred secondary to the COVID-19 pandemic and secondary to telephone visit restrictions.    LABORATORY:  On 2021, his lipids were elevated.  LDL was not calculated.  Potassium 4.0.  GFR normal.    Echocardiogram 2017 reveals an ejection fraction in the 20%-25% range.  No significant valvular pathology identified.      IMPRESSION:      Lul is a 61-year-old gentleman who has several active issues:    1.  Severe nonischemic cardiomyopathy.    The patient's severe cardiomyopathy is thought secondary to chemotherapy in the past.    As you recall, he was a previous patient of Dr. Blanco, who confirmed that the patient never underwent an ischemic workup in the past due to refusing workup.  He has refused coronary angiography, ICD placement, coronary CTA and an even echocardiography at this point.      I offered him further workup again today and he again refused.  He did not want further ischemic workup or even an echocardiogram at this point in time and continues his negative perspective towards his overall general health.    The only thing he would agree to is a renewal of his medications today, and we have performed that for him.    2.  Hypertension.    He does not keep track of his blood pressures at home; however, his last check in the office on 21 appeared within reason. Continue to follow outpatient visit information.    3.   Hyperlipidemia.      His lipids are clearly out of control and a statin was offered today, but declined.  Unfortunately, he simply refuses treatment.      PLAN:      1.  It is unfortunate that this person with a severe nonischemic cardiomyopathy has refused further workup and treatment and titration of his medicines once again.      However, it is certainly within his right to choose this quality of life that he has been accustomed to and elects to continue.  However, we will continue to offer him workup, studies and followup if he should change his mind.    He says that he will call us next time he is planning to visit the [place] and perhaps schedule an echo then, and he refuses to schedule a followup visit at this point in time as well.    Once again, it was a pleasure participating in the care of your patient, Mr. Mateo Goodman.  Please feel free to contact me at any time if any questions regarding his care in the future.    Sincerely,         Dalton Reynolds MD        D: 11/10/2021   T: 11/10/2021   MT: OCTAVIA    Name:     MATEO GOODMAN  MRN:      [id number]        Account:      [id number]   :      1960           Service Date: 11/10/2021       Document: [id number]

## 2021-11-10 NOTE — PROGRESS NOTES
"Lul is a 61 year old who is being evaluated via a billable telephone visit.      What phone number would you like to be contacted at? [phone]  How would you like to obtain your AVS? Mail a copy    The patient has been notified of following:     \"This phone visit will be conducted via a call between you and your physician/provider. We have found that certain health care needs can be provided without the need for an in-person physical exam.  This service lets us provide the care you need with a phone conversation.  If a prescription is necessary we can send it directly to your pharmacy.  If lab work is needed we can place an order for that and you can then stop by our lab to have the test done at a later time.    Phone visits are billed at different rates depending on your insurance coverage.  Please reach out to your insurance provider with any questions.    If during the course of the call the physician/provider feels a phone visit is not appropriate, you will not be charged for this service.\"    Patient has given verbal consent for phone visit? Yes    How would you like to obtain your AVS? MyChart    Duration of call:14min    Total visit time (including visit time, charting, chart review, coordination of care, etc.=31min    See dictation #[id number]    CSN#:[id number]          "

## 2021-11-11 ASSESSMENT — PATIENT HEALTH QUESTIONNAIRE - PHQ9: SUM OF ALL RESPONSES TO PHQ QUESTIONS 1-9: 3

## 2021-11-26 RX ORDER — ATORVASTATIN CALCIUM 40 MG/1
40 TABLET, FILM COATED ORAL DAILY
Qty: 90 TABLET | Refills: 3 | Status: SHIPPED | OUTPATIENT
Start: 2021-11-26 | End: 2023-10-26

## 2022-02-09 DIAGNOSIS — K21.9 GASTROESOPHAGEAL REFLUX DISEASE WITHOUT ESOPHAGITIS: ICD-10-CM

## 2022-02-09 DIAGNOSIS — F41.1 ANXIETY STATE: ICD-10-CM

## 2022-02-09 DIAGNOSIS — M54.2 NECK PAIN: ICD-10-CM

## 2022-02-09 RX ORDER — CYCLOBENZAPRINE HCL 10 MG
10 TABLET ORAL 3 TIMES DAILY PRN
Qty: 90 TABLET | Refills: 3 | Status: SHIPPED | OUTPATIENT
Start: 2022-02-09 | End: 2022-10-19

## 2022-02-09 RX ORDER — DIAZEPAM 10 MG
10 TABLET ORAL EVERY 12 HOURS PRN
Qty: 60 TABLET | Refills: 3 | Status: SHIPPED | OUTPATIENT
Start: 2022-02-09 | End: 2022-08-22

## 2022-02-09 RX ORDER — FAMOTIDINE 40 MG/1
40 TABLET, FILM COATED ORAL DAILY
Qty: 90 TABLET | Refills: 1 | Status: SHIPPED | OUTPATIENT
Start: 2022-02-09 | End: 2022-09-21

## 2022-02-09 NOTE — TELEPHONE ENCOUNTER
[facility] Call Center    Phone Message    May a detailed message be left on voicemail: yes     Reason for Call: Medication Question or concern regarding medication   Prescription Clarification  Name of Medication:   Prescribing Provider: Dr Lorenz   Pharmacy:      [facility] [address]       What on the order needs clarification? Pt brought this prescription to be filled.Pharmacy calling about the medications. famotidine (PEPCID) 40 MG tablet, and diazepam (VALIUM) 10 MG tablet and cyclobenzaprine (FLEXERIL) 10 MG tablet  pharmacy is calling to see if these are valid as this was signed over 5 months ago. Please call and discuss further. Also note that this was over as orders.        Action Taken: Message routed to:  [facility] MIKE    Travel Screening: Not Applicable

## 2022-02-09 NOTE — TELEPHONE ENCOUNTER
Pt had hard copy Rx of diazepam, flexeril, pepcid on 9/17/21, but never filled yet. I sent flexeril and pepcid via '[facility]'. Diazepam Rx needs provider's signature.

## 2022-04-18 DIAGNOSIS — M54.2 NECK PAIN: ICD-10-CM

## 2022-04-18 DIAGNOSIS — G44.209 TENSION HEADACHE: ICD-10-CM

## 2022-04-18 NOTE — TELEPHONE ENCOUNTER
PDMP site reviewed today. Butalbital-acetaminophen-caffeine last 2/26/22 for #30 tablets.    Will send to provider for review.    Shilpa Mckeon RN ([place])

## 2022-04-19 RX ORDER — BUTALBITAL, ACETAMINOPHEN AND CAFFEINE 50; 325; 40 MG/1; MG/1; MG/1
1 TABLET ORAL EVERY 4 HOURS PRN
Qty: 30 TABLET | Refills: 0 | Status: SHIPPED | OUTPATIENT
Start: 2022-04-19 | End: 2022-06-20

## 2022-06-20 DIAGNOSIS — M54.2 NECK PAIN: ICD-10-CM

## 2022-06-20 DIAGNOSIS — G44.209 TENSION HEADACHE: ICD-10-CM

## 2022-06-20 RX ORDER — BUTALBITAL, ACETAMINOPHEN AND CAFFEINE 50; 325; 40 MG/1; MG/1; MG/1
1 TABLET ORAL EVERY 4 HOURS PRN
Qty: 30 TABLET | Refills: 0 | Status: SHIPPED | OUTPATIENT
Start: 2022-06-20 | End: 2022-08-15

## 2022-08-15 DIAGNOSIS — G44.209 TENSION HEADACHE: ICD-10-CM

## 2022-08-15 DIAGNOSIS — M54.2 NECK PAIN: ICD-10-CM

## 2022-08-15 RX ORDER — BUTALBITAL, ACETAMINOPHEN AND CAFFEINE 50; 325; 40 MG/1; MG/1; MG/1
TABLET ORAL
Qty: 30 TABLET | Refills: 0 | Status: SHIPPED | OUTPATIENT
Start: 2022-08-15 | End: 2022-10-19

## 2022-08-20 DIAGNOSIS — F41.1 ANXIETY STATE: ICD-10-CM

## 2022-08-22 RX ORDER — DIAZEPAM 10 MG
TABLET ORAL
Qty: 60 TABLET | Refills: 3 | Status: SHIPPED | OUTPATIENT
Start: 2022-08-22 | End: 2022-10-19

## 2022-09-12 DIAGNOSIS — B00.9 HSV (HERPES SIMPLEX VIRUS) INFECTION: ICD-10-CM

## 2022-09-15 RX ORDER — ACYCLOVIR 200 MG/1
CAPSULE ORAL
Qty: 270 CAPSULE | Refills: 0 | Status: SHIPPED | OUTPATIENT
Start: 2022-09-15 | End: 2023-10-26

## 2022-09-15 NOTE — TELEPHONE ENCOUNTER
acyclovir (ZOVIRAX) 200 MG capsule      9/17/2021  [facility] [place]       Donald Lorenz MD    Family Medicine    Nv:  none

## 2022-09-20 DIAGNOSIS — K21.9 GASTROESOPHAGEAL REFLUX DISEASE WITHOUT ESOPHAGITIS: ICD-10-CM

## 2022-09-21 RX ORDER — FAMOTIDINE 40 MG/1
40 TABLET, FILM COATED ORAL DAILY
Qty: 90 TABLET | Refills: 0 | Status: SHIPPED | OUTPATIENT
Start: 2022-09-21 | End: 2022-10-19

## 2022-10-10 ENCOUNTER — HEALTH MAINTENANCE LETTER (OUTPATIENT)
Age: 62
End: 2022-10-10

## 2022-10-19 ENCOUNTER — LAB (OUTPATIENT)
Dept: LAB | Facility: CLINIC | Age: 62
End: 2022-10-19
Payer: MEDICARE

## 2022-10-19 ENCOUNTER — OFFICE VISIT (OUTPATIENT)
Dept: FAMILY MEDICINE | Facility: CLINIC | Age: 62
End: 2022-10-19
Payer: MEDICARE

## 2022-10-19 VITALS
SYSTOLIC BLOOD PRESSURE: 115 MMHG | DIASTOLIC BLOOD PRESSURE: 79 MMHG | OXYGEN SATURATION: 98 % | WEIGHT: 187.2 LBS | BODY MASS INDEX: 26.21 KG/M2 | HEART RATE: 84 BPM | HEIGHT: 71 IN

## 2022-10-19 DIAGNOSIS — K21.9 GASTROESOPHAGEAL REFLUX DISEASE WITHOUT ESOPHAGITIS: ICD-10-CM

## 2022-10-19 DIAGNOSIS — M54.2 NECK PAIN: ICD-10-CM

## 2022-10-19 DIAGNOSIS — L21.9 SEBORRHEIC DERMATITIS: ICD-10-CM

## 2022-10-19 DIAGNOSIS — R42 VERTIGO: Primary | ICD-10-CM

## 2022-10-19 DIAGNOSIS — Z00.00 HEALTH CARE MAINTENANCE: ICD-10-CM

## 2022-10-19 DIAGNOSIS — I42.8 CARDIOMYOPATHY, NONISCHEMIC (H): ICD-10-CM

## 2022-10-19 DIAGNOSIS — I10 BENIGN ESSENTIAL HYPERTENSION: ICD-10-CM

## 2022-10-19 DIAGNOSIS — Z12.5 ENCOUNTER FOR SCREENING FOR MALIGNANT NEOPLASM OF PROSTATE: ICD-10-CM

## 2022-10-19 DIAGNOSIS — N40.1 BENIGN LOCALIZED PROSTATIC HYPERPLASIA WITH LOWER URINARY TRACT SYMPTOMS (LUTS): ICD-10-CM

## 2022-10-19 DIAGNOSIS — R21 RASH: ICD-10-CM

## 2022-10-19 DIAGNOSIS — G44.209 TENSION HEADACHE: ICD-10-CM

## 2022-10-19 DIAGNOSIS — F41.1 ANXIETY STATE: ICD-10-CM

## 2022-10-19 DIAGNOSIS — E03.8 SUBCLINICAL HYPOTHYROIDISM: ICD-10-CM

## 2022-10-19 LAB
ALBUMIN SERPL BCG-MCNC: 4.2 G/DL (ref 3.5–5.2)
ALP SERPL-CCNC: 91 U/L (ref 40–129)
ALT SERPL W P-5'-P-CCNC: 8 U/L (ref 10–50)
ANION GAP SERPL CALCULATED.3IONS-SCNC: 11 MMOL/L (ref 7–15)
AST SERPL W P-5'-P-CCNC: 16 U/L (ref 10–50)
BASOPHILS # BLD AUTO: 0.1 10E3/UL (ref 0–0.2)
BASOPHILS NFR BLD AUTO: 1 %
BILIRUB SERPL-MCNC: 0.5 MG/DL
BUN SERPL-MCNC: 16.6 MG/DL (ref 8–23)
CALCIUM SERPL-MCNC: 9.6 MG/DL (ref 8.8–10.2)
CHLORIDE SERPL-SCNC: 101 MMOL/L (ref 98–107)
CHOLEST SERPL-MCNC: 235 MG/DL
CREAT SERPL-MCNC: 1.17 MG/DL (ref 0.67–1.17)
DEPRECATED HCO3 PLAS-SCNC: 27 MMOL/L (ref 22–29)
EOSINOPHIL # BLD AUTO: 0.4 10E3/UL (ref 0–0.7)
EOSINOPHIL NFR BLD AUTO: 4 %
ERYTHROCYTE [DISTWIDTH] IN BLOOD BY AUTOMATED COUNT: 12.8 % (ref 10–15)
GFR SERPL CREATININE-BSD FRML MDRD: 71 ML/MIN/1.73M2
GLUCOSE SERPL-MCNC: 107 MG/DL (ref 70–99)
HCT VFR BLD AUTO: 46.1 % (ref 40–53)
HDLC SERPL-MCNC: 34 MG/DL
HGB BLD-MCNC: 14.8 G/DL (ref 13.3–17.7)
IMM GRANULOCYTES # BLD: 0 10E3/UL
IMM GRANULOCYTES NFR BLD: 0 %
LDLC SERPL CALC-MCNC: ABNORMAL MG/DL
LDLC SERPL DIRECT ASSAY-MCNC: 116 MG/DL
LYMPHOCYTES # BLD AUTO: 3 10E3/UL (ref 0.8–5.3)
LYMPHOCYTES NFR BLD AUTO: 28 %
MCH RBC QN AUTO: 29.9 PG (ref 26.5–33)
MCHC RBC AUTO-ENTMCNC: 32.1 G/DL (ref 31.5–36.5)
MCV RBC AUTO: 93 FL (ref 78–100)
MONOCYTES # BLD AUTO: 0.7 10E3/UL (ref 0–1.3)
MONOCYTES NFR BLD AUTO: 7 %
NEUTROPHILS # BLD AUTO: 6.7 10E3/UL (ref 1.6–8.3)
NEUTROPHILS NFR BLD AUTO: 60 %
NONHDLC SERPL-MCNC: 201 MG/DL
NRBC # BLD AUTO: 0 10E3/UL
NRBC BLD AUTO-RTO: 0 /100
PLATELET # BLD AUTO: 290 10E3/UL (ref 150–450)
POTASSIUM SERPL-SCNC: 3.8 MMOL/L (ref 3.4–5.3)
PROT SERPL-MCNC: 7.5 G/DL (ref 6.4–8.3)
PSA SERPL-MCNC: 0.8 NG/ML (ref 0–4.5)
RBC # BLD AUTO: 4.95 10E6/UL (ref 4.4–5.9)
SODIUM SERPL-SCNC: 139 MMOL/L (ref 136–145)
T4 FREE SERPL-MCNC: 1.16 NG/DL (ref 0.9–1.7)
TRIGL SERPL-MCNC: 407 MG/DL
TSH SERPL DL<=0.005 MIU/L-ACNC: 6.51 UIU/ML (ref 0.3–4.2)
WBC # BLD AUTO: 11 10E3/UL (ref 4–11)

## 2022-10-19 PROCEDURE — 80053 COMPREHEN METABOLIC PANEL: CPT | Performed by: PATHOLOGY

## 2022-10-19 PROCEDURE — 84443 ASSAY THYROID STIM HORMONE: CPT | Performed by: FAMILY MEDICINE

## 2022-10-19 PROCEDURE — 99215 OFFICE O/P EST HI 40 MIN: CPT | Performed by: FAMILY MEDICINE

## 2022-10-19 PROCEDURE — 84439 ASSAY OF FREE THYROXINE: CPT | Performed by: FAMILY MEDICINE

## 2022-10-19 PROCEDURE — G0103 PSA SCREENING: HCPCS | Performed by: FAMILY MEDICINE

## 2022-10-19 PROCEDURE — 84443 ASSAY THYROID STIM HORMONE: CPT | Performed by: PATHOLOGY

## 2022-10-19 PROCEDURE — 85025 COMPLETE CBC W/AUTO DIFF WBC: CPT | Performed by: PATHOLOGY

## 2022-10-19 PROCEDURE — 80061 LIPID PANEL: CPT | Performed by: FAMILY MEDICINE

## 2022-10-19 PROCEDURE — 36415 COLL VENOUS BLD VENIPUNCTURE: CPT | Performed by: PATHOLOGY

## 2022-10-19 PROCEDURE — 83721 ASSAY OF BLOOD LIPOPROTEIN: CPT | Performed by: FAMILY MEDICINE

## 2022-10-19 RX ORDER — DIAZEPAM 10 MG
TABLET ORAL
Qty: 60 TABLET | Refills: 3 | Status: SHIPPED | OUTPATIENT
Start: 2022-10-19 | End: 2023-03-01

## 2022-10-19 RX ORDER — DOXYCYCLINE 100 MG/1
100 CAPSULE ORAL 2 TIMES DAILY
Qty: 20 CAPSULE | Refills: 0 | Status: SHIPPED | OUTPATIENT
Start: 2022-10-19

## 2022-10-19 RX ORDER — CICLOPIROX 1 G/100ML
SHAMPOO TOPICAL
Qty: 120 ML | Refills: 3 | Status: SHIPPED | OUTPATIENT
Start: 2022-10-20

## 2022-10-19 RX ORDER — FAMOTIDINE 40 MG/1
40 TABLET, FILM COATED ORAL DAILY
Qty: 90 TABLET | Refills: 3 | Status: SHIPPED | OUTPATIENT
Start: 2022-10-19 | End: 2023-10-26

## 2022-10-19 RX ORDER — TAMSULOSIN HYDROCHLORIDE 0.4 MG/1
0.4 CAPSULE ORAL DAILY
Qty: 90 CAPSULE | Refills: 3 | Status: SHIPPED | OUTPATIENT
Start: 2022-10-19 | End: 2023-10-26

## 2022-10-19 RX ORDER — BUTALBITAL, ACETAMINOPHEN AND CAFFEINE 50; 325; 40 MG/1; MG/1; MG/1
TABLET ORAL
Qty: 30 TABLET | Refills: 0 | Status: SHIPPED | OUTPATIENT
Start: 2022-10-19 | End: 2022-12-20

## 2022-10-19 RX ORDER — CYCLOBENZAPRINE HCL 10 MG
10 TABLET ORAL 3 TIMES DAILY PRN
Qty: 90 TABLET | Refills: 3 | Status: SHIPPED | OUTPATIENT
Start: 2022-10-19 | End: 2023-10-26

## 2022-10-19 NOTE — PROGRESS NOTES
Assessment & Plan     Vertigo  Describes intermittent vertigo  - Adult ENT  Referral; Future    Health care maintenance  Due  - PSA screen; Future    Benign essential hypertension  Due  - CBC with platelets differential; Future  - Lipid panel reflex to direct LDL Fasting; Future  - Comprehensive metabolic panel; Future  - TSH with free T4 reflex; Future    Encounter for screening for malignant neoplasm of prostate  Due  - PSA screen; Future    Seborrheic dermatitis  Try awaiting derm visit  - ciclopirox 1 % SHAM; Externally apply topically twice a week    Neck pain  Continue, tolerates  - butalbital-acetaminophen-caffeine (ESGIC) -40 MG tablet; Take one po every day prn  - cyclobenzaprine (FLEXERIL) 10 MG tablet; Take 1 tablet (10 mg) by mouth 3 times daily as needed for muscle spasms    Tension headache  Same  - butalbital-acetaminophen-caffeine (ESGIC) -40 MG tablet; Take one po every day prn    Anxiety state  Discussed at length   - diazepam (VALIUM) 10 MG tablet; TAKE ONE TABLET BY MOUTH EVERY 12 HOURS AS NEEDED FOR ANXIETY    Gastroesophageal reflux disease without esophagitis  Could do EGD/HP tests he knows, med helps a great deal  - famotidine (PEPCID) 40 MG tablet; Take 1 tablet (40 mg) by mouth daily    Benign localized prostatic hyperplasia with lower urinary tract symptoms (LUTS)  Helps, tolerated  - tamsulosin (FLOMAX) 0.4 MG capsule; Take 1 capsule (0.4 mg) by mouth daily    Rash  Try pending visit to derm  - doxycycline hyclate (VIBRAMYCIN) 100 MG capsule; Take 1 capsule (100 mg) by mouth 2 times daily    Heart disease: no interval sx, offered to help set up cardio visit he would like to do on his own    Subclinical hypothyroidism  I've asked RN to offer synthroid 25 mcg/day trial        Review of external notes as documented elsewhere in note  55 minutes spent on the date of the encounter doing chart review, history and exam, documentation and further activities per the  "note    BMI:   Estimated body mass index is 26.12 kg/m  as calculated from the following:    Height as of this encounter: 1.803 m (5' 10.98\").    Weight as of this encounter: 84.9 kg (187 lb 3.2 oz).     Return in about 1 year (around 10/19/2023).    Donald Lorenz MD  [facility] ERASTO Mccarty is a 61 year old, presenting for the following health issues:  Physical (Routine, green discharge from left ear, dizziness/syncope)      HPI   Overall stable except intermittent vertigo, none for a few mo, when occurs is nauseated, comes goes unpredicably, worried about Menieres vs Schwannoma. Mid summer noted ear greenish drg, self limited, no ear pain or [place] now  Declines immunizatinos, reviewed all  Facial redness, flaky eyebrows, has dandruff, cortaid some relief nizoral none, sees derm in a few mo  Heart disease, Dr Reynolds notes rvwd, due for f/u, no new cardio sx on ROS, pt would like to make own cardio apt  HSV: acyclvir helps tolerated he'd like to continue  Anxiety: much improved w/ valium as is  rvw he knows ideally he'd wean when tried anxiety worsens significantly has used for many years w/o red flags  Sedative, uses successfully for many years for HA, tolerates, no red flags, he is aware this and valium are not first line but given his long term use w/o concerns can continue  GERD: better w/ Pepcid. Discussed consider EGD for chronic nature, consider H pylori test, he decilnes both  Flomax helps LUTS sx, tolerated, due for routine PSA  Colon utd per care ev    Past Medical History:   Diagnosis Date     Cardiomyopathy, nonischemic (H) 3/23/2018     No past surgical history on file.  Current Outpatient Medications   Medication     acyclovir (ZOVIRAX) 200 MG capsule     Aspirin-Acetaminophen-Caffeine (EXCEDRIN PO)     atorvastatin (LIPITOR) 40 MG tablet     butalbital-acetaminophen-caffeine (ESGIC) -40 MG tablet     carvedilol (COREG) 12.5 MG tablet     " "[START ON 10/20/2022] ciclopirox 1 % SHAM     cyclobenzaprine (FLEXERIL) 10 MG tablet     diazepam (VALIUM) 10 MG tablet     doxycycline hyclate (VIBRAMYCIN) 100 MG capsule     famotidine (PEPCID) 40 MG tablet     furosemide (LASIX) 20 MG tablet     hydrocortisone (CORTAID) 1 % external cream     lisinopril (ZESTRIL) 5 MG tablet     potassium chloride ER (K-TAB/KLOR-CON) 10 MEQ CR tablet     tamsulosin (FLOMAX) 0.4 MG capsule     amoxicillin (AMOXIL) 875 MG tablet     ASPIRIN PO     MOTRIN IB PO     No current facility-administered medications for this visit.     Allergies   Allergen Reactions     Ultram [Tramadol] Nausea     Baclofen      Imitrex [Sumatriptan] Nausea     Ivp Dye [Contrast Dye] Nausea and Vomiting     N & V, eyes, nose, throat swelled as a child     Keflex [Cephalexin Hcl]      Remeron Soltab      Valtrex [Valacyclovir] Other (See Comments)     Stomach pains     Celebrex [Celecoxib] Palpitations             Review of Systems         Objective    /79 (BP Location: Left arm, Patient Position: Sitting, Cuff Size: Adult Regular)   Pulse 84   Ht 1.803 m (5' 10.98\")   Wt 84.9 kg (187 lb 3.2 oz)   SpO2 98%   BMI 26.12 kg/m    Body mass index is 26.12 kg/m .  Physical Exam   GENERAL: healthy, alert and no distress  No nystagmus  NECK: no adenopathy, no asymmetry, masses, or scars and thyroid normal to palpation  RESP: lungs clear to auscultation - no rales, rhonchi or wheezes  CV: regular rate and rhythm, normal S1 S2, no S3 or S4, no murmur, click or rub, no peripheral edema and peripheral pulses strong  ABDOMEN: soft, nontender, no hepatosplenomegaly, no masses and bowel sounds normal  MS: no gross musculoskeletal defects noted, no edema  Dandruff in scalp/eyebrown, diffuse red along nose/forehead              "

## 2022-10-19 NOTE — NURSING NOTE
[facility] MEDICINE  H&P    PATIENT ID:  NAME:  Kendra Woodall  MRN:               [id number]  YOB: 2020    CC: [facility]    HPI: Kendra Woodall is a 1 days female born a 39w0d by pCS on 2020 at 1100 to a 21 y/o G1nP1, GBS Neg mom who is A+, HIV (neg), Hep B (NR), RPR (NR), Rubella immune. Pregnancy c/b GDMA2 (32 u night and 60u am) and gHTN. Fetal Macrosomia. Mom A1c April 6.6 Birth weight 4740g. Apgars 8/9.  Baby placed on blow-by due to low pulse ox dipping below 90%.  Transferred to nursery on 30% O2 johnson for tachypnea and sats below 90% at 1445 room air challenge was started patient passed and was okay to transfer to mom's room.  Patient O2 would dip below 90% towards the end feed but self recovered with nipple out of mouth.. Feeding, voiding x3 and  stooling x2.    DIET: Breast with Similac supplementation    FAMILY HISTORY:  Family History   Problem Relation Age of Onset   • Diabetes Maternal Grandmother         Copied from mother's family history at birth       PHYSICAL EXAM:  Vitals:    21 1745 21 2000 21 0000 21 0400   Pulse: 154 144 140 132   Resp: 48 48 52 44   Temp: 37.1 °C (98.8 °F) 37.4 °C (99.3 °F) 37.6 °C (99.6 °F) 36.9 °C (98.4 °F)   TempSrc: Axillary Axillary Axillary Axillary   SpO2: 95%      Weight:  4.575 kg (10 lb 1.4 oz)     Height:       HC:       , Temp (24hrs), Av.2 °C (98.9 °F), Min:36.8 °C (98.2 °F), Max:37.6 °C (99.6 °F)    Pulse Oximetry: 95 %, O2 (LPM): 0, FiO2%: 21 %, O2 Delivery Device: None - Room Air  87 %ile (Z= 1.13) based on WHO (Girls, 0-2 years) weight-for-recumbent length data based on body measurements available as of 2021.     General: Large Baby NAD, awakens appropriately  Head: Atraumatic, fontanelles open and flat  Eyes:  symmetric red reflex  ENT: Ears are well set, patent auditory canals, nares patent, no palatodefects  Neck: no torticollis, clavicles intact   Chest: Symmetric  Lul Arriola is a 61 year old male patient that presents today in clinic for the following:    Chief Complaint   Patient presents with     Physical     Routine, green discharge from left ear, dizziness/syncope     The patient's allergies and medications were reviewed as noted. A set of vitals were recorded as noted without incident. The patient does not have any other questions for the provider.    Abhilash Gil, EMT at 10:07 AM on 10/19/2022   respirations  Lungs: CTAB, no retractions/grunts    Cardiovascular: normal S1/S2, RRR, Slight murmurs. + Femoral pulses Bilaterally  Abdomen: Soft without masses, nl umbilical stump, drying  Genitourinary: Nl female genitalia, anus patent  Extremities: ABERNATHY, no deformities, hips stable. Resting in frog leg position.    Spine: Straight without jeremy/dimples  Skin: Pink, warm and dry, no jaundice, no rashes  Neuro: normal strength and tone  Reflexes: + jonna, + babinski, + suckle, + grasp.     LAB TESTS:   No results for input(s): WBC, RBC, HEMOGLOBIN, HEMATOCRIT, MCV, MCH, RDW, PLATELETCT, MPV, NEUTSPOLYS, LYMPHOCYTES, MONOCYTES, EOSINOPHILS, BASOPHILS, RBCMORPHOLO in the last 72 hours.      Recent Labs     21  1241 21  1453 21  1740 21  0040   GLUCOSE 42  --   --   --    POCGLUCOSE  --  50 45 48       ASSESSMENT/PLAN: 1 days   female at term delivered by CS for fetal macrosomia.     #Hypoglycemia  - Baby on Hypoglycemia protocol  - Glucose ranging 45-70 last 25 hours   - Continue feeds and supplement with formula.   - D50 Gel prn. Not used yet.    #Hypoxia/ Tachypnea   -Baby was stating <90% on RA after birth.  - Monitored and on o2 johnson for first few hours.  -Tachypnea improved and baby passed 2 hr on RA challenge.  - continue to monitor on q4 vitals,     #Heart Murmur  -Echo ordered and pending.     1. Continue monitoring Glucose and vitals.   2. Vitals stable. Exam + for Heart murmur.   3. Echo pending  4. Dispo: Anticipate DC after echo, stable glucose, and pending Dispo of MOB.  5. Follow up: 1-3 days after DC with [facility].

## 2022-10-25 DIAGNOSIS — I42.9 CARDIOMYOPATHY, UNSPECIFIED TYPE (H): ICD-10-CM

## 2022-10-25 DIAGNOSIS — I10 BENIGN ESSENTIAL HYPERTENSION: ICD-10-CM

## 2022-10-25 DIAGNOSIS — I50.22 CHRONIC SYSTOLIC CONGESTIVE HEART FAILURE (H): ICD-10-CM

## 2022-10-25 RX ORDER — FUROSEMIDE 20 MG
20 TABLET ORAL DAILY
Qty: 90 TABLET | Refills: 0 | Status: SHIPPED | OUTPATIENT
Start: 2022-10-25 | End: 2022-11-09

## 2022-10-25 RX ORDER — CARVEDILOL 12.5 MG/1
12.5 TABLET ORAL 2 TIMES DAILY WITH MEALS
Qty: 180 TABLET | Refills: 0 | Status: SHIPPED | OUTPATIENT
Start: 2022-10-25 | End: 2022-11-09

## 2022-10-25 RX ORDER — LISINOPRIL 5 MG/1
5 TABLET ORAL 2 TIMES DAILY
Qty: 180 TABLET | Refills: 0 | Status: SHIPPED | OUTPATIENT
Start: 2022-10-25 | End: 2022-11-09

## 2022-10-25 RX ORDER — POTASSIUM CHLORIDE 750 MG/1
10 TABLET, EXTENDED RELEASE ORAL DAILY
Qty: 90 TABLET | Refills: 0 | Status: SHIPPED | OUTPATIENT
Start: 2022-10-25 | End: 2022-11-09

## 2022-10-25 NOTE — TELEPHONE ENCOUNTER
Last Clinic Visit: 11/10/2021  [facility]  [place] 11/9/22  Call to Lul advised prescriptions sent to requested pharmacy

## 2022-10-25 NOTE — TELEPHONE ENCOUNTER
[facility] Call Center    Phone Message    May a detailed message be left on voicemail: yes     Reason for Call: Medication Refill Request    Has the patient contacted the pharmacy for the refill? Yes   Name of medication being requested: furosemide (LASIX) 20 MG tablet  lisinopril (ZESTRIL) 5 MG tablet  carvedilol (COREG) 12.5 MG tablet  potassium chloride ER (K-TAB/KLOR-CON) 10 MEQ CR tablet    Provider who prescribed the medication: Dr. Reynolds  Pharmacy: [address]  Date medication is needed: 10.31.22    NOTE:  Pt has an appt w/Dr. Reynolds 11.9.22    Action Taken: Message routed to:  [facility] cardio    Travel Screening: Not Applicable     Thank you!  [facility]

## 2022-11-09 ENCOUNTER — VIRTUAL VISIT (OUTPATIENT)
Dept: CARDIOLOGY | Facility: CLINIC | Age: 62
End: 2022-11-09
Attending: INTERNAL MEDICINE
Payer: MEDICARE

## 2022-11-09 DIAGNOSIS — I50.22 CHRONIC SYSTOLIC CONGESTIVE HEART FAILURE (H): ICD-10-CM

## 2022-11-09 DIAGNOSIS — I42.9 CARDIOMYOPATHY, UNSPECIFIED TYPE (H): ICD-10-CM

## 2022-11-09 DIAGNOSIS — I10 BENIGN ESSENTIAL HYPERTENSION: ICD-10-CM

## 2022-11-09 PROCEDURE — 99215 OFFICE O/P EST HI 40 MIN: CPT | Mod: 95 | Performed by: INTERNAL MEDICINE

## 2022-11-09 RX ORDER — CARVEDILOL 12.5 MG/1
12.5 TABLET ORAL 2 TIMES DAILY WITH MEALS
Qty: 180 TABLET | Refills: 3 | Status: SHIPPED | OUTPATIENT
Start: 2022-11-09 | End: 2023-10-26

## 2022-11-09 RX ORDER — POTASSIUM CHLORIDE 750 MG/1
10 TABLET, EXTENDED RELEASE ORAL DAILY
Qty: 90 TABLET | Refills: 3 | Status: SHIPPED | OUTPATIENT
Start: 2022-11-09 | End: 2023-10-26

## 2022-11-09 RX ORDER — FUROSEMIDE 20 MG
20 TABLET ORAL DAILY
Qty: 90 TABLET | Refills: 3 | Status: SHIPPED | OUTPATIENT
Start: 2022-11-09 | End: 2023-10-26

## 2022-11-09 RX ORDER — LISINOPRIL 5 MG/1
5 TABLET ORAL 2 TIMES DAILY
Qty: 180 TABLET | Refills: 3 | Status: SHIPPED | OUTPATIENT
Start: 2022-11-09 | End: 2023-10-26

## 2022-11-09 NOTE — PROGRESS NOTES
"Lul is a 62 year old who is being evaluated via a billable telephone visit.      What phone number would you like to be contacted at? [phone]  How would you like to obtain your AVS? Duran KING    The patient has been notified of following:     \"This phone visit will be conducted via a call between you and your physician/provider. We have found that certain health care needs can be provided without the need for an in-person physical exam.  This service lets us provide the care you need with a phone conversation.  If a prescription is necessary we can send it directly to your pharmacy.  If lab work is needed we can place an order for that and you can then stop by our lab to have the test done at a later time.    Phone visits are billed at different rates depending on your insurance coverage.  Please reach out to your insurance provider with any questions.    If during the course of the call the physician/provider feels a phone visit is not appropriate, you will not be charged for this service.\"    Patient has given verbal consent for phone visit? Yes    How would you like to obtain your AVS? Duran    Duration of call:15min    Total visit time (including visit time, charting, chart review, coordination of care, etc.=50min    See dictation #[id number]    CSN#:[id number]        "

## 2022-11-09 NOTE — LETTER
"11/9/2022      RE: Lul Arriola  [address]  [facility] [address]       Dear Colleague,    Thank you for the opportunity to participate in the care of your patient, Lul Arriola, at the [facility]. Please see a copy of my visit note below.    Lul is a 62 year old who is being evaluated via a billable telephone visit.      What phone number would you like to be contacted at? [phone]  How would you like to obtain your AVS? Duran KING    The patient has been notified of following:     \"This phone visit will be conducted via a call between you and your physician/provider. We have found that certain health care needs can be provided without the need for an in-person physical exam.  This service lets us provide the care you need with a phone conversation.  If a prescription is necessary we can send it directly to your pharmacy.  If lab work is needed we can place an order for that and you can then stop by our lab to have the test done at a later time.    Phone visits are billed at different rates depending on your insurance coverage.  Please reach out to your insurance provider with any questions.    If during the course of the call the physician/provider feels a phone visit is not appropriate, you will not be charged for this service.\"    Patient has given verbal consent for phone visit? Yes    How would you like to obtain your AVS? Duran    Duration of call:15min    Total visit time (including visit time, charting, chart review, coordination of care, etc.=50min    See dictation #[id number]    CSN#:[id number]            Please do not hesitate to contact me if you have any questions/concerns.     Sincerely,     Dalton Reynolds MD    "

## 2022-11-27 ENCOUNTER — HEALTH MAINTENANCE LETTER (OUTPATIENT)
Age: 62
End: 2022-11-27

## 2022-12-19 DIAGNOSIS — M54.2 NECK PAIN: ICD-10-CM

## 2022-12-19 DIAGNOSIS — G44.209 TENSION HEADACHE: ICD-10-CM

## 2022-12-20 NOTE — TELEPHONE ENCOUNTER
butalbital-acetaminophen-caffeine (ESGIC) -40 MG tablet 30 tablet 0 10/19/2022         Last Written Prescription Date:  10-  Last Fill Quantity: 30,   # refills: 0  Last Office Visit : 10-  Future Office visit:  NONE    Routing refill request to provider for review/approval because:  CONTROLLED MEDICATION      Kathleen M Doege RN

## 2022-12-21 RX ORDER — BUTALBITAL, ACETAMINOPHEN AND CAFFEINE 50; 325; 40 MG/1; MG/1; MG/1
1 TABLET ORAL DAILY PRN
Qty: 30 TABLET | Refills: 3 | Status: SHIPPED | OUTPATIENT
Start: 2022-12-21 | End: 2023-08-03

## 2023-02-28 DIAGNOSIS — I50.22 CHRONIC SYSTOLIC CONGESTIVE HEART FAILURE (H): ICD-10-CM

## 2023-02-28 DIAGNOSIS — F41.1 ANXIETY STATE: ICD-10-CM

## 2023-02-28 DIAGNOSIS — I42.9 CARDIOMYOPATHY, UNSPECIFIED TYPE (H): ICD-10-CM

## 2023-02-28 DIAGNOSIS — I10 BENIGN ESSENTIAL HYPERTENSION: ICD-10-CM

## 2023-03-01 RX ORDER — DIAZEPAM 10 MG
TABLET ORAL
Qty: 60 TABLET | Refills: 3 | Status: SHIPPED | OUTPATIENT
Start: 2023-03-01 | End: 2023-09-12

## 2023-03-01 NOTE — TELEPHONE ENCOUNTER
DIAZEPAM 10MG TABS    Last Written Prescription Date:  10/19/22  Last Fill Quantity: 60,   # refills: 3  Last Office Visit : 10/19/22  Future Office visit:  none    Routing refill request to provider for review/approval because:  Drug not on the [facility], [facility] or [facility] refill protocol or controlled substance

## 2023-03-03 RX ORDER — LISINOPRIL 5 MG/1
TABLET ORAL
Qty: 180 TABLET | Refills: 0 | OUTPATIENT
Start: 2023-03-03

## 2023-03-03 RX ORDER — CARVEDILOL 12.5 MG/1
TABLET ORAL
Qty: 180 TABLET | Refills: 0 | OUTPATIENT
Start: 2023-03-03

## 2023-03-03 RX ORDER — FUROSEMIDE 20 MG
TABLET ORAL
Qty: 90 TABLET | Refills: 0 | OUTPATIENT
Start: 2023-03-03

## 2023-05-23 NOTE — PROGRESS NOTES
[facility] Dermatology Note  Encounter Date: May 24, 2023  Office Visit     Dermatology Problem List:  1. Hx Hodgkin's lymphoma.   - s/p chemoradiation in the early 1990s.   2. Hx NMSC   - BCC, L chest, s/p MMS 4/6/21  3. Digital mucous cyst, L 3rd digit, s/p excision 4/6/21  4. Seborrheic dermatitis  - ketoconazole 2% cream, tacrolimus 0.1% ointment   5. ISKs, s/p cryotherapy   # NUB, left back, ddx: rule out BCC  # NUB, right upper bacl, ddx: rule out BCC  - s/p bx 5/24/2023  ____________________________________________    Assessment & Plan:    # Seborrheic dermatitis, eyebrows. Chronic, flare.   - Start ketoconazole 2% cream BID prn  - Start tacrolimus 0.1% ointment BID prn    # NUB, left back, ddx:  BLK rule out pigmented BCC or SCC  # NUB, right upper back, ddx: BLK rule out pigmented BCC or SCC  - Shave biopsies today, see procedure note below     # ISKs, right parietal scalp, right preauricular cheek, right upper arm, left flank x 5, right flank x 5.  - Cryotherapy today, see procedure note below    # Hx NMSC, no evidence of recurrent disease  - Continue annual skin exams    # Benign lesions: Multiple benign nevi, solar lentigos, seborrheic keratoses, cherry angiomas. Explained to patient benign nature of lesion. No treatment is necessary at this time unless the lesion changes or becomes symptomatic.   - ABCDs of melanoma were discussed and self skin checks were advised.  - Sun precaution was advised including the use of sun screens of SPF 30 or higher, sun protective clothing, and avoidance of tanning beds.    Procedures Performed:   - Shave biopsy procedure note, location(s): see above. After discussion of benefits and risks including but not limited to bleeding, infection, scar, incomplete removal, recurrence, and non-diagnostic biopsy, written consent and photographs were obtained. The area was cleaned with isopropyl alcohol. 0.5mL of 1% lidocaine with epinephrine was injected to  obtain adequate anesthesia of lesion(s). Shave biopsy at site(s) performed. Hemostasis was achieved with aluminium chloride. Petrolatum ointment and a sterile dressing were applied. The patient tolerated the procedure and no complications were noted. The patient was provided with verbal and written post care instructions.   - Cryotherapy procedure note, location(s): see above. After verbal consent and discussion of risks and benefits including, but not limited to, dyspigmentation/scar, blister, and pain, 14 lesion(s) was(were) treated with 1-2 mm freeze border for 1-2 cycles with liquid nitrogen. Post cryotherapy instructions were provided.    Follow-up: pending path results    Staff and Scribe:     Scribe Disclosure:  I, SHARON PARKER, am serving as a scribe to document services personally performed by Tima Montenegro MD based on data collection and the provider's statements to me.     Provider Disclosure:   The documentation recorded by the scribe accurately reflects the services I personally performed and the decisions made by me.    Tima Montenegro MD    Department of Dermatology  [facility]: Phone: [phone], Fax:[phone]  [facility]: Phone: [phone] Fax: [phone]  ____________________________________________    CC: Skin Check (FBSE. dry flakey eyebrows, lump on head, AND MOLE CHEEK ON HIS BACK skin check)    HPI:  Mr. Lul Arriola is a(n) 62 year old male who presents today as a return patient for FBSE. Last seen by Dr. Michaels on 4/20/21, at which time patient was healing well from surgery on the left chest.    Today, the patient reports concerns about dry flaky eyebrows. He has tried anti dandruff cream, hydrocortisone, Vaseline, vitamin E oil and nothing has worked.     He also has a bump on his head. There is also a lesion on his back that he would like closely  examined. He has several lesions on his stomach and face that bleed and catch on clothing.    Does not wear sunscreen regularly but is not outdoors much.    Patient is otherwise feeling well, without additional skin concerns.    Labs Reviewed:  N/A    Physical Exam:  Vitals: There were no vitals taken for this visit.  SKIN: Waist-up skin, which includes the head/face, neck, both arms, chest, back, abdomen, digits and/or nails was examined. Patient deferred a full body scan.  - Left back, pink shiny papule with speckled pigment on periphery.  - Right upper back, pink shiny papule with peripheral gray brown pigment clods.  - Pink scaly thin plaques on the forehead and eyebrows.   - There are tan to brown waxy stuck on papules with surrounding erythema on the right parietal scalp, right preauricular cheek, right upper arm, left flank x 5, right flank x 5.   - Well healed scar on the left chest.  - There are dome shaped bright red papules on the trunk and extremities.   - Multiple regular brown pigmented macules and papules are identified on the trunk and extremities.   - Scattered brown macules on sun exposed areas.  - Waxy stuck on papules and plaques on trunk and extremities.   - No other lesions of concern on areas examined.     Medications:  Current Outpatient Medications   Medication     Aspirin-Acetaminophen-Caffeine (EXCEDRIN PO)     butalbital-acetaminophen-caffeine (ESGIC) -40 MG tablet     carvedilol (COREG) 12.5 MG tablet     ciclopirox 1 % SHAM     cyclobenzaprine (FLEXERIL) 10 MG tablet     diazepam (VALIUM) 10 MG tablet     famotidine (PEPCID) 40 MG tablet     furosemide (LASIX) 20 MG tablet     hydrocortisone (CORTAID) 1 % external cream     lisinopril (ZESTRIL) 5 MG tablet     MOTRIN IB PO     potassium chloride ER (K-TAB/KLOR-CON) 10 MEQ CR tablet     acyclovir (ZOVIRAX) 200 MG capsule     amoxicillin (AMOXIL) 875 MG tablet     ASPIRIN PO     atorvastatin (LIPITOR) 40 MG tablet     doxycycline  hyclate (VIBRAMYCIN) 100 MG capsule     tamsulosin (FLOMAX) 0.4 MG capsule     No current facility-administered medications for this visit.      Past Medical History:   Patient Active Problem List   Diagnosis     Anxiety state     Dysthymia     Cardiomyopathy, nonischemic (H)     Past Medical History:   Diagnosis Date     Cardiomyopathy, nonischemic (H) 3/23/2018

## 2023-05-24 ENCOUNTER — OFFICE VISIT (OUTPATIENT)
Dept: DERMATOLOGY | Facility: CLINIC | Age: 63
End: 2023-05-24
Payer: MEDICARE

## 2023-05-24 DIAGNOSIS — L81.4 SOLAR LENTIGO: ICD-10-CM

## 2023-05-24 DIAGNOSIS — L82.1 SEBORRHEIC KERATOSIS: ICD-10-CM

## 2023-05-24 DIAGNOSIS — D18.01 CHERRY ANGIOMA: ICD-10-CM

## 2023-05-24 DIAGNOSIS — L82.0 INFLAMED SEBORRHEIC KERATOSIS: ICD-10-CM

## 2023-05-24 DIAGNOSIS — Z85.828 HISTORY OF NONMELANOMA SKIN CANCER: ICD-10-CM

## 2023-05-24 DIAGNOSIS — D22.9 MULTIPLE BENIGN NEVI: ICD-10-CM

## 2023-05-24 DIAGNOSIS — L21.9 DERMATITIS, SEBORRHEIC: Primary | ICD-10-CM

## 2023-05-24 DIAGNOSIS — D49.2 NEOPLASM OF UNSPECIFIED BEHAVIOR OF BONE, SOFT TISSUE, AND SKIN: ICD-10-CM

## 2023-05-24 PROCEDURE — 88305 TISSUE EXAM BY PATHOLOGIST: CPT | Mod: 26 | Performed by: DERMATOLOGY

## 2023-05-24 PROCEDURE — 11103 TANGNTL BX SKIN EA SEP/ADDL: CPT | Mod: XS | Performed by: DERMATOLOGY

## 2023-05-24 PROCEDURE — 17110 DESTRUCTION B9 LES UP TO 14: CPT | Performed by: DERMATOLOGY

## 2023-05-24 PROCEDURE — 11102 TANGNTL BX SKIN SINGLE LES: CPT | Mod: XS | Performed by: DERMATOLOGY

## 2023-05-24 PROCEDURE — 88305 TISSUE EXAM BY PATHOLOGIST: CPT | Mod: TC | Performed by: DERMATOLOGY

## 2023-05-24 PROCEDURE — 99214 OFFICE O/P EST MOD 30 MIN: CPT | Mod: 25 | Performed by: DERMATOLOGY

## 2023-05-24 RX ORDER — TACROLIMUS 1 MG/G
OINTMENT TOPICAL
Qty: 60 G | Refills: 11 | Status: SHIPPED | OUTPATIENT
Start: 2023-05-24

## 2023-05-24 RX ORDER — KETOCONAZOLE 20 MG/G
CREAM TOPICAL
Qty: 60 G | Refills: 11 | Status: SHIPPED | OUTPATIENT
Start: 2023-05-24

## 2023-05-24 ASSESSMENT — PAIN SCALES - GENERAL: PAINLEVEL: NO PAIN (0)

## 2023-05-24 NOTE — LETTER
5/24/2023       RE: Lul Arriola  [address]  [facility] [address]     Dear Colleague,    Thank you for referring your patient, Lul Arriola, to the [facility] [place] at [facility]. Please see a copy of my visit note below.    [facility] Dermatology Note  Encounter Date: May 24, 2023  Office Visit     Dermatology Problem List:  1. Hx Hodgkin's lymphoma.   - s/p chemoradiation in the early 1990s.   2. Hx NMSC   - BCC, L chest, s/p MMS 4/6/21  3. Digital mucous cyst, L 3rd digit, s/p excision 4/6/21  4. Seborrheic dermatitis  - ketoconazole 2% cream, tacrolimus 0.1% ointment   5. ISKs, s/p cryotherapy   # NUB, left back, ddx: rule out BCC  # NUB, right upper bacl, ddx: rule out BCC  - s/p bx 5/24/2023  ____________________________________________    Assessment & Plan:    # Seborrheic dermatitis, eyebrows. Chronic, flare.   - Start ketoconazole 2% cream BID prn  - Start tacrolimus 0.1% ointment BID prn    # NUB, left back, ddx:  BLK rule out pigmented BCC or SCC  # NUB, right upper back, ddx: BLK rule out pigmented BCC or SCC  - Shave biopsies today, see procedure note below     # ISKs, right parietal scalp, right preauricular cheek, right upper arm, left flank x 5, right flank x 5.  - Cryotherapy today, see procedure note below    # Hx NMSC, no evidence of recurrent disease  - Continue annual skin exams    # Benign lesions: Multiple benign nevi, solar lentigos, seborrheic keratoses, cherry angiomas. Explained to patient benign nature of lesion. No treatment is necessary at this time unless the lesion changes or becomes symptomatic.   - ABCDs of melanoma were discussed and self skin checks were advised.  - Sun precaution was advised including the use of sun screens of SPF 30 or higher, sun protective clothing, and avoidance of tanning beds.    Procedures Performed:   - Shave biopsy procedure note,  location(s): see above. After discussion of benefits and risks including but not limited to bleeding, infection, scar, incomplete removal, recurrence, and non-diagnostic biopsy, written consent and photographs were obtained. The area was cleaned with isopropyl alcohol. 0.5mL of 1% lidocaine with epinephrine was injected to obtain adequate anesthesia of lesion(s). Shave biopsy at site(s) performed. Hemostasis was achieved with aluminium chloride. Petrolatum ointment and a sterile dressing were applied. The patient tolerated the procedure and no complications were noted. The patient was provided with verbal and written post care instructions.   - Cryotherapy procedure note, location(s): see above. After verbal consent and discussion of risks and benefits including, but not limited to, dyspigmentation/scar, blister, and pain, 14 lesion(s) was(were) treated with 1-2 mm freeze border for 1-2 cycles with liquid nitrogen. Post cryotherapy instructions were provided.    Follow-up: pending path results    Staff and Scribe:     Scribe Disclosure:  I, SHARON PARKER, am serving as a scribe to document services personally performed by Tima Montenegro MD based on data collection and the provider's statements to me.     Provider Disclosure:   The documentation recorded by the scribe accurately reflects the services I personally performed and the decisions made by me.    Tima Montenegro MD    Department of Dermatology  [facility]: Phone: [phone], Fax:[phone]  [facility]: Phone: [phone] Fax: [phone]  ____________________________________________    CC: Skin Check (FBSE. dry flakey eyebrows, lump on head, AND MOLE CHEEK ON HIS BACK skin check)    HPI:  Mr. Lul Arriola is a(n) 62 year old male who presents today as a return patient for FBSE. Last seen by Dr. Michaels on 4/20/21, at  which time patient was healing well from surgery on the left chest.    Today, the patient reports concerns about dry flaky eyebrows. He has tried anti dandruff cream, hydrocortisone, Vaseline, vitamin E oil and nothing has worked.     He also has a bump on his head. There is also a lesion on his back that he would like closely examined. He has several lesions on his stomach and face that bleed and catch on clothing.    Does not wear sunscreen regularly but is not outdoors much.    Patient is otherwise feeling well, without additional skin concerns.    Labs Reviewed:  N/A    Physical Exam:  Vitals: There were no vitals taken for this visit.  SKIN: Waist-up skin, which includes the head/face, neck, both arms, chest, back, abdomen, digits and/or nails was examined. Patient deferred a full body scan.  - Left back, pink shiny papule with speckled pigment on periphery.  - Right upper back, pink shiny papule with peripheral gray brown pigment clods.  - Pink scaly thin plaques on the forehead and eyebrows.   - There are tan to brown waxy stuck on papules with surrounding erythema on the right parietal scalp, right preauricular cheek, right upper arm, left flank x 5, right flank x 5.   - Well healed scar on the left chest.  - There are dome shaped bright red papules on the trunk and extremities.   - Multiple regular brown pigmented macules and papules are identified on the trunk and extremities.   - Scattered brown macules on sun exposed areas.  - Waxy stuck on papules and plaques on trunk and extremities.   - No other lesions of concern on areas examined.     Medications:  Current Outpatient Medications   Medication    Aspirin-Acetaminophen-Caffeine (EXCEDRIN PO)    butalbital-acetaminophen-caffeine (ESGIC) -40 MG tablet    carvedilol (COREG) 12.5 MG tablet    ciclopirox 1 % SHAM    cyclobenzaprine (FLEXERIL) 10 MG tablet    diazepam (VALIUM) 10 MG tablet    famotidine (PEPCID) 40 MG tablet    furosemide (LASIX) 20 MG  tablet    hydrocortisone (CORTAID) 1 % external cream    lisinopril (ZESTRIL) 5 MG tablet    MOTRIN IB PO    potassium chloride ER (K-TAB/KLOR-CON) 10 MEQ CR tablet    acyclovir (ZOVIRAX) 200 MG capsule    amoxicillin (AMOXIL) 875 MG tablet    ASPIRIN PO    atorvastatin (LIPITOR) 40 MG tablet    doxycycline hyclate (VIBRAMYCIN) 100 MG capsule    tamsulosin (FLOMAX) 0.4 MG capsule     No current facility-administered medications for this visit.      Past Medical History:   Patient Active Problem List   Diagnosis    Anxiety state    Dysthymia    Cardiomyopathy, nonischemic (H)     Past Medical History:   Diagnosis Date    Cardiomyopathy, nonischemic (H) 3/23/2018

## 2023-05-24 NOTE — PATIENT INSTRUCTIONS
Patient Education     Checking for Skin Cancer  You can find cancer early by checking your skin each month. There are 3 kinds of skin cancer. They are melanoma, basal cell carcinoma, and squamous cell carcinoma. Doing monthly skin checks is the best way to find new marks or skin changes. Follow the instructions below for checking your skin.   The ABCDEs of checking moles for melanoma   Check your moles or growths for signs of melanoma using ABCDE:   Asymmetry: the sides of the mole or growth don t match  Border: the edges are ragged, notched, or blurred  Color: the color within the mole or growth varies  Diameter: the mole or growth is larger than 6 mm (size of a pencil eraser)  Evolving: the size, shape, or color of the mole or growth is changing (evolving is not shown in the images below)    Checking for other types of skin cancer  Basal cell carcinoma or squamous cell carcinoma have symptoms such as:     A spot or mole that looks different from all other marks on your skin  Changes in how an area feels, such as itching, tenderness, or pain  Changes in the skin's surface, such as oozing, bleeding, or scaliness  A sore that does not heal  New swelling or redness beyond the border of a mole    Who s at risk?  Anyone can get skin cancer. But you are at greater risk if you have:   Fair skin, light-colored hair, or light-colored eyes  Many moles or abnormal moles on your skin  A history of sunburns from sunlight or tanning beds  A family history of skin cancer  A history of exposure to radiation or chemicals  A weakened immune system  If you have had skin cancer in the past, you are at risk for recurring skin cancer.   How to check your skin  Do your monthly skin checkups in front of a full-length mirror. Check all parts of your body, including your:   Head (ears, face, neck, and scalp)  Torso (front, back, and sides)  Arms (tops, undersides, upper, and lower armpits)  Hands (palms, backs, and fingers, including  under the nails)  Buttocks and genitals  Legs (front, back, and sides)  Feet (tops, soles, toes, including under the nails, and between toes)  If you have a lot of moles, take digital photos of them each month. Make sure to take photos both up close and from a distance. These can help you see if any moles change over time.   Most skin changes are not cancer. But if you see any changes in your skin, call your doctor right away. Only he or she can diagnose a problem. If you have skin cancer, seeing your doctor can be the first step toward getting the treatment that could save your life.   [employer] last reviewed this educational content on 4/1/2019 2000-2020 [employer]. [address] [place] [address], [place] [address]. All rights reserved. This information is not intended as a substitute for professional medical care. Always follow your healthcare professional's instructions.       When should I call my doctor?  If you are worsening or not improving, please, contact us or seek urgent care as noted below.     Who should I call with questions (adults)?  [facility] (adult and pediatric): [phone]  [facility] (adult): [phone]  For urgent needs outside of business hours call the [facility] at [phone] and ask for the dermatology resident on call to be paged  If this is a medical emergency and you are unable to reach an ER, Call [phone]    Who should I call with questions (pediatric)?  [facility]- Pediatric Dermatology  Dr. Celina Hopkins, Dr. Renzo Larkin, Dr. Ngoc Pruett, OSIEL Ashford, Dr. Zoila London, Dr. Dana Yates & Dr. Marlon Lynch  Non-urgent nurse triage line; [phone]- Joy and Kenzie MURPHY Care Coordinatorneel Whyte (/Complex ) [phone]    If you need a prescription refill, please contact your pharmacy. Refills are approved or denied by our  Physicians during normal business hours, Monday through Fridays  Per office policy, refills will not be granted if you have not been seen within the past year (or sooner depending on your child's condition)    Scheduling Information:  Pediatric Appointment Scheduling and Call Center [phone]  Radiology Scheduling- [phone]  Sedation Unit Scheduling- [phone]  [place] Scheduling- General [phone]; Pediatric Dermatology [phone]  Main  Services: [phone]  Italian: [phone]  Rwandan: [phone]  Hmong/Latvian/Tajik: [phone]  Preadmission Nursing Department Fax Number: [phone] (Fax all pre-operative paperwork to this number)    For urgent matters arising during evenings, weekends, or holidays that cannot wait for normal business hours please call [phone] and ask for the dermatology resident on call to be paged. Cryotherapy    What is it?  Use of a very cold liquid, such as liquid nitrogen, to freeze and destroy abnormal skin cells that need to be removed    What should I expect?  Tenderness and redness  A small blister that might grow and fill with dark purple blood. There may be crusting.  More than one treatment may be needed if the lesions do not go away.    How do I care for the treated area?  Gently wash the area with your hands when bathing.  Use a thin layer of Vaseline to help with healing. You may use a Band-Aid.   The area should heal within 7-10 days and may leave behind a pink or lighter color.   Do not use an antibiotic or Neosporin ointment.   You may take acetaminophen (Tylenol) for pain.     Call your doctor if you have:  Severe pain  Signs of infection (warmth, redness, cloudy yellow drainage, and or a bad smell)  Questions or concerns    Who should I call with questions?      [facility]: [phone]      [facility]: [phone]      For urgent needs outside of business hours  call the [facility] at [phone] and ask for the dermatology resident on call Wound Care After a Biopsy    What is a skin biopsy?  A skin biopsy allows the doctor to examine a very small piece of tissue under the microscope to determine the diagnosis and the best treatment for the skin condition. A local anesthetic (numbing medicine) is injected with a very small needle into the skin area to be tested. A small piece of skin is taken from the area. Sometimes a suture (stitch) is used.     What are the risks of a skin biopsy?  I will experience scar, bleeding, swelling, pain, crusting and redness. I may experience incomplete removal or recurrence. Risks of this procedure are excessive bleeding, bruising, infection, nerve damage, numbness, thick (hypertrophic or keloidal) scar and non-diagnostic biopsy.    How should I care for my wound for the first 24 hours?  Keep the wound dry and covered for 24 hours  If it bleeds, hold direct pressure on the area for 15 minutes. If bleeding does not stop, call us or go to the emergency room  Avoid strenuous exercise the first 1-2 days or as your doctor instructs you    How should I care for the wound after 24 hours?  After 24 hours, remove the bandage  You may bathe or shower as normal  If you had a scalp biopsy, you can shampoo as usual and can use shower water to clean the biopsy site daily  Clean the wound once a day with gentle soap and water  Do not scrub, be gentle  Apply white petroleum/Vaseline after cleaning the wound with a cotton swab or a clean finger, and keep the site covered with a Bandaid /bandage. Bandages are not necessary with a scalp biopsy  If you are unable to cover the site with a Bandaid /bandage, re-apply ointment 2-3 times a day to keep the site moist. Moisture will help with healing  Avoid strenuous activity for first 1-2 days  Avoid lakes, rivers, pools, and oceans until the stitches are removed or the site is healed    How do I clean my  wound?  Wash hands thoroughly with soap or use hand  before all wound care  Clean the wound with gentle soap and water  Apply white petroleum/Vaseline  to wound after it is clean  Replace the Bandaid /bandage to keep the wound covered for the first few days or as instructed by your doctor  If you had a scalp biopsy, warm shower water to the area on a daily basis should suffice    What should I use to clean my wound?   Cotton-tipped applicators (Qtips )  White petroleum jelly (Vaseline ). Use a clean new container and use Q-tips to apply.  Bandaids  as needed  Gentle soap     How should I care for my wound long term?  Do not get your wound dirty  Keep up with wound care for one week or until the area is healed.  A small scab will form and fall off by itself when the area is completely healed. The area will be red and will become pink in color as it heals. Sun protection is very important for how your scar will turn out. Sunscreen with an SPF 30 or greater is recommended once the area is healed.  You should have some soreness but it should be mild and slowly go away over several days. Talk to your doctor about using tylenol for pain,    When should I call my doctor?  If you have increased:   Pain or swelling  Pus or drainage (clear or slightly yellow drainage is ok)  Temperature over 100F  Spreading redness or warmth around wound    When will I hear about my results?  The biopsy results can take 2 weeks to come back.  Your results will automatically release to [employer] before your provider has even reviewed them.  The clinic will call you with the results, send you a [employer] message, or have you schedule a follow-up clinic or phone time to discuss the results.  Contact our clinics if you do not hear from us in 2 weeks.    Who should I call with questions?  [facility]: [phone]  [facility]: [phone]  For urgent needs outside of business  hours call the [facility] at [phone] and ask for the dermatology resident on call

## 2023-05-24 NOTE — NURSING NOTE
Lidocaine-epinephrine 1-1:857794 % injection   1.5mL once for one use, starting 5/24/2023 ending 5/24/2023,  2mL disp, R-0, injection  Injected by Ayden Dominguez EMT

## 2023-05-24 NOTE — NURSING NOTE
Dermatology Rooming Note    Lul Arriola's goals for this visit include:   Chief Complaint   Patient presents with     Skin Check     FBSE. dry flakey eyebrows, lump on head, AND MOLE CHEEK ON HIS BACK skin check   Loyda Mckeon, Visit Facilitator

## 2023-05-24 NOTE — LETTER
Rachel 15, 2023      Lul Arriola  [address]  [facility] [address]        Dear ,    We have been trying to reach you to inform you of the results of your biopsy that was completed on 5/24/2023.     1. The two spots on the left back and the right upper back were both basal cell carcinomas. I would recommend an electrodesiccation and curettage (scrape and burn) procedure for both the spots. Please call us at [phone] so we explain this procedure more and schedule you.     Thank you for taking the time to be seen in our dermatology clinic. If you have further questions or concerns, please contact the clinic(see phone number listed below).    Resulted Orders   Dermatological Path Order and Indications   Result Value Ref Range    Case Report       Surgical Pathology Report                         Case: [id number]                                  Authorizing Provider:  Tima Montenegro MD   Collected:           05/24/2023 01:14 PM          Ordering Location:     [facility]          Received:            05/24/2023 03:25 PM                                 Dermatology Clinic                                                                                  [place]                                                                  Pathologist:           Mathew Toure MD                                                       Specimens:   A) - Skin, left back                                                                                B) - Skin, right upper back                                                                Final Diagnosis       A(1). L back:  - Basal cell carcinoma, superficial type - (see description)     B(2). R upper back:  - Basal cell carcinoma, nodular type - (see description)       Clinical Information       The patient is a 62 year old male.      Gross Description       A(1). Skin, left back:  The specimen is received in formalin with proper patient  "identification, labeled \"left back\".  The specimen consists of a 1.1 x 0.7 cm skin shave with a tan finely granular surface.  The resection margin is inked black.  The specimen is trisected and entirely submitted in cassette A1.   B(2). Skin, right upper back:  The specimen is received in formalin with proper patient identification, labeled \"right upper back\".  The specimen consists of a 0.8 x 0.8 cm irregular tan skin shave.  The skin surface contains a 0.4 x 0.4 x 0.1 cm ill-defined white-pink raised lesion.  The resection margin is inked, the specimen is trisected and submitted in its entirety in cassette B1.      Microscopic Description       A. The specimen exhibits a tumor of atypical basaloid epithelium arranged as buds off the epidermal undersurface with fibrotic stroma and clefting artifact.    B. The specimen exhibits a tumor of atypical basaloid epithelium arranged as islands in the dermis with fibromyxoid stroma and clefting artifact.       MCRS Yes (A) N/A    Performing Labs       The technical component of this testing was completed at [facility]           If you have any questions or concerns, please call the clinic at the number listed above.       Sincerely,      Tima Montenegro MD            "

## 2023-05-25 LAB
PATH REPORT.COMMENTS IMP SPEC: ABNORMAL
PATH REPORT.COMMENTS IMP SPEC: ABNORMAL
PATH REPORT.COMMENTS IMP SPEC: YES
PATH REPORT.FINAL DX SPEC: ABNORMAL
PATH REPORT.GROSS SPEC: ABNORMAL
PATH REPORT.MICROSCOPIC SPEC OTHER STN: ABNORMAL
PATH REPORT.RELEVANT HX SPEC: ABNORMAL

## 2023-05-25 NOTE — RESULT ENCOUNTER NOTE
Mariet sent.   Can we schedule for ED&C x 2.   Can overbook on Wed, June 7th @ either 7:45 AM or 8:45 AM as overbook.   Thanks!    Tima Montenegro MD  Pronouns: he/him/his    Department of Dermatology  [facility]: Phone: [phone], Fax:[phone]  [facility]: Phone: [phone] Fax: [phone]

## 2023-06-20 ENCOUNTER — TELEPHONE (OUTPATIENT)
Dept: DERMATOLOGY | Facility: CLINIC | Age: 63
End: 2023-06-20
Payer: MEDICARE

## 2023-06-20 NOTE — TELEPHONE ENCOUNTER
Dermatological Path Order and Indications: Patient Communication     Edit Comments   Add Notifications     Dear Lul Arriola,     We are writing to inform you of your test results that show:     1. The two spots on the left back and the right upper back were both basal cell carcinomas. I would recommend an electrodesiccation and curettage (scrape and burn) procedure for both the spots. My staff will reach out to explain this procedure more and schedule you.     Thank you for taking the time to be seen in our dermatology clinic. If you have further questions or concerns, please contact the clinic(see phone number listed below).        Sincerely,     Tima Montenegro MD  Pronouns: he/him/his    Department of Dermatology  [facility]: Phone: [phone], Fax:[phone]  [facility]: Phone: [phone] Fax: [phone]

## 2023-06-20 NOTE — TELEPHONE ENCOUNTER
"After speaking with the patient for 15 minutes and informing him that it is important for him to treat this areas even though its not melanoma, the patient agreed the do an ED&C. Appointment was schedule. Patient questioned why we are not \"melon balling\" this one out like the past and I explained the different between the treatments and difference in depth once again. Patient states he understands and states \"I guess I'll have to come back every year for this scrape and burn because we'll never know if its truly gone\". I asked the patient multiple times if he would rather have an excision in that case but patient denied because Dr. Montenegro said this treatment would work.    Dustin COONEY CMA   "

## 2023-08-01 DIAGNOSIS — G44.209 TENSION HEADACHE: ICD-10-CM

## 2023-08-01 DIAGNOSIS — M54.2 NECK PAIN: ICD-10-CM

## 2023-08-02 NOTE — TELEPHONE ENCOUNTER
Butalbital-APAP-Caffeine -40 MG Oral Tablet    Last Written Prescription Date:  12/21/22  Last Fill Quantity: 30,   # refills: 3  Last Office Visit : 10/19/22  Future Office visit:  none    Routing refill request to provider for review/approval because:  Controlled substance

## 2023-08-03 RX ORDER — BUTALBITAL, ACETAMINOPHEN AND CAFFEINE 50; 325; 40 MG/1; MG/1; MG/1
TABLET ORAL
Qty: 30 TABLET | Refills: 3 | Status: SHIPPED | OUTPATIENT
Start: 2023-08-03 | End: 2023-10-26

## 2023-08-31 ENCOUNTER — OFFICE VISIT (OUTPATIENT)
Dept: DERMATOLOGY | Facility: CLINIC | Age: 63
End: 2023-08-31
Payer: MEDICARE

## 2023-08-31 DIAGNOSIS — L82.0 INFLAMED SEBORRHEIC KERATOSIS: ICD-10-CM

## 2023-08-31 DIAGNOSIS — C44.519 BCC (BASAL CELL CARCINOMA), TRUNK: Primary | ICD-10-CM

## 2023-08-31 PROCEDURE — 17262 DSTRJ MAL LES T/A/L 1.1-2.0: CPT | Performed by: DERMATOLOGY

## 2023-08-31 PROCEDURE — 17110 DESTRUCTION B9 LES UP TO 14: CPT | Mod: XS | Performed by: DERMATOLOGY

## 2023-08-31 ASSESSMENT — PAIN SCALES - GENERAL: PAINLEVEL: NO PAIN (0)

## 2023-08-31 NOTE — NURSING NOTE
Dermatology Rooming Note    Lul Arriola's goals for this visit include:   Chief Complaint   Patient presents with    Derm Problem     Lul is here today for 2 ED&C     Dustin COONEY CMA

## 2023-08-31 NOTE — PROGRESS NOTES
Procedure Note: Electrodesiccation and Curettage    PREOPERATIVE DIAGNOSIS:  1. Basal cell carcinoma, nodular  2. Basal cell carcinoma, superficial    LOCATION: Right upper back  PREOPERATIVE SIZE: 1.0x1.2 cm   PREOPERATIVE SIZE: 1.2x1.4 cm    LOCATION: left back  PREOPERATIVE SIZE: 1.0x1.0 cm   PREOPERATIVE SIZE: 1.4x1.4 cm    The risks and benefits of the procedure were described to the patient. These include but are not limited to bleeding, infection, scar, incomplete removal, and recurrence. Written informed consent was obtained. The above sites were cleansed with an alcohol pad and injected with 1% lidocaine with epinephrine. Once anesthesia was obtained, the site was prepped with isopropyl alcohol and rinsed with sterile saline. The lesions were curetted with in 3 directions and this was followed by electrodessication. This process was repeated three times. Petrolatum ointment and a bandage were applied to the wound. The patient tolerated the procedure well and was given post care instructions.    Cryotherapy procedure note: After verbal consent and discussion of risks and benefits including, but not limited to, dyspigmentation/scar, blister, and pain, 1 inflamed seborrheic keratosis was(were) treated with 1-2 mm freeze border for 1-2 cycles with liquid nitrogen. Post cryotherapy instructions were provided.       Clinical Follow-up: 6 months

## 2023-08-31 NOTE — LETTER
8/31/2023       RE: Lul Arriola  [address]  [facility] [address]     Dear Colleague,    Thank you for referring your patient, Lul Arriola, to the [facility] [place] at [facility]. Please see a copy of my visit note below.    Procedure Note: Electrodesiccation and Curettage    PREOPERATIVE DIAGNOSIS:  1. Basal cell carcinoma, nodular  2. Basal cell carcinoma, superficial    LOCATION: Right upper back  PREOPERATIVE SIZE: 1.0x1.2 cm   PREOPERATIVE SIZE: 1.2x1.4 cm    LOCATION: left back  PREOPERATIVE SIZE: 1.0x1.0 cm   PREOPERATIVE SIZE: 1.4x1.4 cm    The risks and benefits of the procedure were described to the patient. These include but are not limited to bleeding, infection, scar, incomplete removal, and recurrence. Written informed consent was obtained. The above sites were cleansed with an alcohol pad and injected with 1% lidocaine with epinephrine. Once anesthesia was obtained, the site was prepped with isopropyl alcohol and rinsed with sterile saline. The lesions were curetted with in 3 directions and this was followed by electrodessication. This process was repeated three times. Petrolatum ointment and a bandage were applied to the wound. The patient tolerated the procedure well and was given post care instructions.    Cryotherapy procedure note: After verbal consent and discussion of risks and benefits including, but not limited to, dyspigmentation/scar, blister, and pain, 1 inflamed seborrheic keratosis was(were) treated with 1-2 mm freeze border for 1-2 cycles with liquid nitrogen. Post cryotherapy instructions were provided.       Clinical Follow-up: 6 months

## 2023-09-07 NOTE — PROGRESS NOTES
Service Date: 2022    Donald Lorenz MD  Primary Care Center  [address] [place] [address], [address]  [place] [address]     RE:  Lul Arriola  MRN: [id number]  : 1960    Dear Dr. Dr. Lorenz:    It was a pleasure participating in the care of your patient, Mr. Lul Arriola.  As you know, he is a 62-year-old gentleman who I spoke to over the phone today for severe cardiomyopathy, hypertension and hyperlipidemia.    His past medical history is significant for the followin.  Hypertension.  2.  Hyperlipidemia.  3.  Anxiety.  4.  Dysthymia.  5.  Hodgkin lymphoma, status post chemotherapy and radiation.  6.  Depression/anxiety.  7.  Tobacco abuse.  8.  Osteoporosis.    His cardiac history is significant for a severe cardiomyopathy thought secondary to chemotherapy.  The patient has a history of Hodgkin disease diagnosed in , status post chemotherapy and radiation.    According to Dr. Blanco and his prior documentation, the patient never had any angiogram or any type of ischemic workup due to the patient refusing it in the past.  The patient has also refused ICD placement, further workup and further medications.    Consistent with his prior visits in the past, the patient's affect continues to be quite better and frustrated in tone and borderline hostile in terms of his overall health and health care.  He remains quite negative in terms of his overall outlook on health.    From a functional standpoint, the patient maintains that he can walk through the store for about 30 minutes and he will get mildly short of breath putting away groceries, denies any edema.  He says his weight has been relatively steady and in fact it has gone down from last year from 200 pounds down to 187 pounds.    He otherwise denies sepideh chest pain, PND, orthopnea, edema, palpitations, syncope or near syncope.    He is not taking aspirin as prescribed.  He is not taking Lipitor 40 mg as prescribed.      CURRENT  MEDICATIONS:    1.  Carvedilol 12.5 twice daily.  2.  Lasix 20 mg a day.  3.  Lisinopril 5 mg twice daily.  4.  Potassium 10 mEq a day.    PHYSICAL EXAMINATION:      VITAL SIGNS:  His last blood pressure on 10/19/2022 was 115/79 with a pulse of 84.  His weight was 187 pounds at that time.  GENERAL:  His decision-making capabilities are intact.  PSYCHIATRIC:  He is alert and oriented.  RESPIRATORY:  He is breathing comfortably without gross cough.    The remainder of the comprehensive physical exam was deferred secondary to the COVID-19 pandemic and secondary to telephone visit restrictions.    Labs 10/19/2022, total cholesterol 235, triglycerides 407, GFR was normal.  Hemoglobin normal.      IMPRESSION:      Lul is a 62-year-old gentleman with several active issues:    1.  Severe nonischemic cardiomyopathy.    The patient's severe cardiomyopathy is thought secondary to chemotherapy in the past.    He was a previous patient of Dr. Blanco, who confirmed that the patient never underwent an ischemic workup in the past due to refusal.      He has refused coronary angiography, ICD placement, coronary CTA and/or even echocardiography and has been noncompliant with his medications as well.    I once again offered him further workup today and he again refused.      He does not want further workup, an echo or a change in his medications and continues to be quite negative in terms of his perspective towards his overall general health ,at least outwardly.      The only thing he would agree is a renewal of some of his medications today and we have helped facilitate this.    2.  Hypertension.    He does not keep track of his blood pressures at home; however, last check in the office appeared reasonable.  Continue to follow as an outpatient.    3.  Hyperlipidemia, uncontrolled.    Lipids are clearly out of control and a statin was once again offered today; however, he declined.      PLAN:       Again, it is quite unfortunate  that this patient with a severe nonischemic cardiomyopathy has refused further workup and treatment and adjustment of his medicines.    We are reminded however, that it is certainly within his right to choose this type of quality of life that he has been accustomed to and that he elects to continue.  We will continue to offer him workup studies and followup should he change his mind.    He has also refused to schedule a followup appointment and says that he will contact us when he is ready once again.    Once again, it was a pleasure participating in the care of your patient, Mr. Mateo Goodman.  Please feel free to contact me anytime if any questions regarding his care in the future.    Sincerely,        Dalton Reynolds MD        D: 2022   T: 2022   MT: OCTAVIA    Name:     MATEO GOODMAN  MRN:      [id number]        Account:      [id number]   :      1960           Service Date: 2022       Document: [id number]   Valtrex Counseling: I discussed with the patient the risks of valacyclovir including but not limited to kidney damage, nausea, vomiting and severe allergy.  The patient understands that if the infection seems to be worsening or is not improving, they are to call.

## 2023-09-11 DIAGNOSIS — F41.1 ANXIETY STATE: ICD-10-CM

## 2023-09-12 RX ORDER — DIAZEPAM 10 MG
10 TABLET ORAL 2 TIMES DAILY PRN
Qty: 60 TABLET | Refills: 1 | Status: SHIPPED | OUTPATIENT
Start: 2023-09-12 | End: 2023-10-26

## 2023-09-12 NOTE — TELEPHONE ENCOUNTER
DIAZEPAM 10MG TABS   Last Written Prescription Date:   3/1/2023  Last Fill Quantity: 60,   # refills: 3  Last Office Visit :  10/19/2022  Future Office visit:  None    Routing refill request to provider for review/approval because:  Drug not on the [facility] or [facility] refill protocol or controlled substance    Aylin Mathews RN  Central Triage Red Flags/Med Refills

## 2023-10-26 ENCOUNTER — LAB (OUTPATIENT)
Dept: LAB | Facility: CLINIC | Age: 63
End: 2023-10-26
Payer: MEDICARE

## 2023-10-26 ENCOUNTER — OFFICE VISIT (OUTPATIENT)
Dept: FAMILY MEDICINE | Facility: CLINIC | Age: 63
End: 2023-10-26
Payer: MEDICARE

## 2023-10-26 VITALS
SYSTOLIC BLOOD PRESSURE: 121 MMHG | WEIGHT: 182.5 LBS | HEART RATE: 76 BPM | BODY MASS INDEX: 25.47 KG/M2 | DIASTOLIC BLOOD PRESSURE: 67 MMHG | OXYGEN SATURATION: 96 %

## 2023-10-26 DIAGNOSIS — I50.22 CHRONIC SYSTOLIC CONGESTIVE HEART FAILURE (H): ICD-10-CM

## 2023-10-26 DIAGNOSIS — I42.8 CARDIOMYOPATHY, NONISCHEMIC (H): Primary | ICD-10-CM

## 2023-10-26 DIAGNOSIS — N40.1 BENIGN LOCALIZED PROSTATIC HYPERPLASIA WITH LOWER URINARY TRACT SYMPTOMS (LUTS): ICD-10-CM

## 2023-10-26 DIAGNOSIS — B00.9 HSV (HERPES SIMPLEX VIRUS) INFECTION: ICD-10-CM

## 2023-10-26 DIAGNOSIS — F41.1 ANXIETY STATE: ICD-10-CM

## 2023-10-26 DIAGNOSIS — G44.209 TENSION HEADACHE: ICD-10-CM

## 2023-10-26 DIAGNOSIS — I42.9 CARDIOMYOPATHY, UNSPECIFIED TYPE (H): ICD-10-CM

## 2023-10-26 DIAGNOSIS — K21.9 GASTROESOPHAGEAL REFLUX DISEASE WITHOUT ESOPHAGITIS: ICD-10-CM

## 2023-10-26 DIAGNOSIS — I10 BENIGN ESSENTIAL HYPERTENSION: ICD-10-CM

## 2023-10-26 DIAGNOSIS — M54.2 NECK PAIN: ICD-10-CM

## 2023-10-26 LAB
ALBUMIN SERPL BCG-MCNC: 4.3 G/DL (ref 3.5–5.2)
ALP SERPL-CCNC: 99 U/L (ref 40–129)
ALT SERPL W P-5'-P-CCNC: 13 U/L (ref 0–70)
ANION GAP SERPL CALCULATED.3IONS-SCNC: 10 MMOL/L (ref 7–15)
AST SERPL W P-5'-P-CCNC: 20 U/L (ref 0–45)
BASOPHILS # BLD AUTO: 0.1 10E3/UL (ref 0–0.2)
BASOPHILS NFR BLD AUTO: 1 %
BILIRUB SERPL-MCNC: 0.7 MG/DL
BUN SERPL-MCNC: 19.4 MG/DL (ref 8–23)
CALCIUM SERPL-MCNC: 9.2 MG/DL (ref 8.8–10.2)
CHLORIDE SERPL-SCNC: 103 MMOL/L (ref 98–107)
CHOLEST SERPL-MCNC: 216 MG/DL
CREAT SERPL-MCNC: 1.09 MG/DL (ref 0.67–1.17)
DEPRECATED HCO3 PLAS-SCNC: 25 MMOL/L (ref 22–29)
EGFRCR SERPLBLD CKD-EPI 2021: 77 ML/MIN/1.73M2
EOSINOPHIL # BLD AUTO: 0.3 10E3/UL (ref 0–0.7)
EOSINOPHIL NFR BLD AUTO: 3 %
ERYTHROCYTE [DISTWIDTH] IN BLOOD BY AUTOMATED COUNT: 12.3 % (ref 10–15)
GLUCOSE SERPL-MCNC: 100 MG/DL (ref 70–99)
HCT VFR BLD AUTO: 43.5 % (ref 40–53)
HDLC SERPL-MCNC: 37 MG/DL
HGB BLD-MCNC: 14.1 G/DL (ref 13.3–17.7)
IMM GRANULOCYTES # BLD: 0 10E3/UL
IMM GRANULOCYTES NFR BLD: 0 %
LDLC SERPL CALC-MCNC: 115 MG/DL
LYMPHOCYTES # BLD AUTO: 2.5 10E3/UL (ref 0.8–5.3)
LYMPHOCYTES NFR BLD AUTO: 31 %
MCH RBC QN AUTO: 30.2 PG (ref 26.5–33)
MCHC RBC AUTO-ENTMCNC: 32.4 G/DL (ref 31.5–36.5)
MCV RBC AUTO: 93 FL (ref 78–100)
MONOCYTES # BLD AUTO: 0.6 10E3/UL (ref 0–1.3)
MONOCYTES NFR BLD AUTO: 7 %
NEUTROPHILS # BLD AUTO: 4.8 10E3/UL (ref 1.6–8.3)
NEUTROPHILS NFR BLD AUTO: 58 %
NONHDLC SERPL-MCNC: 179 MG/DL
NRBC # BLD AUTO: 0 10E3/UL
NRBC BLD AUTO-RTO: 0 /100
PLATELET # BLD AUTO: 265 10E3/UL (ref 150–450)
POTASSIUM SERPL-SCNC: 3.9 MMOL/L (ref 3.4–5.3)
PROT SERPL-MCNC: 7.5 G/DL (ref 6.4–8.3)
PSA SERPL DL<=0.01 NG/ML-MCNC: 0.78 NG/ML (ref 0–4.5)
RBC # BLD AUTO: 4.67 10E6/UL (ref 4.4–5.9)
SODIUM SERPL-SCNC: 138 MMOL/L (ref 135–145)
TRIGL SERPL-MCNC: 321 MG/DL
TSH SERPL DL<=0.005 MIU/L-ACNC: 4.11 UIU/ML (ref 0.3–4.2)
WBC # BLD AUTO: 8.2 10E3/UL (ref 4–11)

## 2023-10-26 PROCEDURE — 84443 ASSAY THYROID STIM HORMONE: CPT | Performed by: PATHOLOGY

## 2023-10-26 PROCEDURE — 80061 LIPID PANEL: CPT | Performed by: PATHOLOGY

## 2023-10-26 PROCEDURE — G0103 PSA SCREENING: HCPCS | Performed by: PATHOLOGY

## 2023-10-26 PROCEDURE — 80053 COMPREHEN METABOLIC PANEL: CPT | Performed by: PATHOLOGY

## 2023-10-26 PROCEDURE — 85025 COMPLETE CBC W/AUTO DIFF WBC: CPT | Performed by: PATHOLOGY

## 2023-10-26 PROCEDURE — 36415 COLL VENOUS BLD VENIPUNCTURE: CPT | Performed by: PATHOLOGY

## 2023-10-26 PROCEDURE — 99215 OFFICE O/P EST HI 40 MIN: CPT | Performed by: FAMILY MEDICINE

## 2023-10-26 RX ORDER — LISINOPRIL 5 MG/1
5 TABLET ORAL 2 TIMES DAILY
Qty: 180 TABLET | Refills: 3 | Status: SHIPPED | OUTPATIENT
Start: 2023-10-26

## 2023-10-26 RX ORDER — FAMOTIDINE 40 MG/1
40 TABLET, FILM COATED ORAL DAILY
Qty: 90 TABLET | Refills: 3 | Status: SHIPPED | OUTPATIENT
Start: 2023-10-26

## 2023-10-26 RX ORDER — FUROSEMIDE 20 MG
20 TABLET ORAL DAILY
Qty: 90 TABLET | Refills: 3 | Status: SHIPPED | OUTPATIENT
Start: 2023-10-26

## 2023-10-26 RX ORDER — DIAZEPAM 10 MG
10 TABLET ORAL 2 TIMES DAILY PRN
Qty: 60 TABLET | Refills: 1 | Status: SHIPPED | OUTPATIENT
Start: 2023-10-26 | End: 2024-01-25

## 2023-10-26 RX ORDER — CARVEDILOL 12.5 MG/1
12.5 TABLET ORAL 2 TIMES DAILY WITH MEALS
Qty: 180 TABLET | Refills: 3 | Status: SHIPPED | OUTPATIENT
Start: 2023-10-26

## 2023-10-26 RX ORDER — CYCLOBENZAPRINE HCL 10 MG
10 TABLET ORAL 3 TIMES DAILY PRN
Qty: 90 TABLET | Refills: 3 | Status: SHIPPED | OUTPATIENT
Start: 2023-10-26

## 2023-10-26 RX ORDER — POTASSIUM CHLORIDE 750 MG/1
10 TABLET, EXTENDED RELEASE ORAL DAILY
Qty: 90 TABLET | Refills: 3 | Status: SHIPPED | OUTPATIENT
Start: 2023-10-26

## 2023-10-26 RX ORDER — ACYCLOVIR 200 MG/1
CAPSULE ORAL
Qty: 270 CAPSULE | Refills: 3 | Status: SHIPPED | OUTPATIENT
Start: 2023-10-26

## 2023-10-26 RX ORDER — BUTALBITAL, ACETAMINOPHEN AND CAFFEINE 50; 325; 40 MG/1; MG/1; MG/1
TABLET ORAL
Qty: 30 TABLET | Refills: 3 | Status: SHIPPED | OUTPATIENT
Start: 2023-10-26 | End: 2024-05-09

## 2023-10-26 RX ORDER — TAMSULOSIN HYDROCHLORIDE 0.4 MG/1
0.4 CAPSULE ORAL DAILY
Qty: 90 CAPSULE | Refills: 3 | Status: SHIPPED | OUTPATIENT
Start: 2023-10-26

## 2023-10-26 NOTE — PROGRESS NOTES
Medicare Annual Wellness Questionnaire:  This 62 year old year old male presents for a Medicare Wellness Exam.    Patient Care Team         Relationship Specialty Notifications Start End    Donald Lorenz MD PCP - General Family Practice  3/19/12     Phone: [phone] Fax: [phone]         [address] [place] [address]    Mary Jane Macario APRN CNS Nurse Practitioner Clinical Nurse Specialist  3/25/16     Phone: [phone] Fax: [phone]         [facility] [address] [facility] [address]    Blayne Alvarez MD MD Dermatology  11/26/19     Phone: [phone] Fax: [phone]         [address] [place] [address]    Dalton Reynolds MD Assigned Heart and Vascular Provider   11/15/20     Phone: [phone] Fax: [phone]         [address] [place] [address]    Tima Montenegro MD MD Dermatology  2/1/21     Phone: [phone] Fax: [phone]         [address] [address]    Donald Lorenz MD Assigned PCP   10/17/21     Phone: [phone] Fax: [phone]         [address] [place] [address]    Tima Montenegro MD MD Dermatology  9/21/22     Phone: [phone] Fax: [phone]         [address] [address]    Marlon Barth MD Assigned Surgical Provider   9/9/23     Phone: [phone] Fax: [phone]         [address] [place] [address]            Fall Risk Assessment:  Have you fallen 2 or more times in the last year?  No    Were you injured due to a fall in the last year?  Pt did not complete    PHQ-2:  Over the last 2 weeks, how often have you been bothered by feeling down, depressed, or hopeless?  Pt declined questionaire     Over the last 2 weeks, how often have you had little interest or pleasure in doing things?  Pt declined questionaire     Social History:  What is your marital status?  Single    Who lives in  "your household?  Just me    Does your home have loose rugs in the hallway:     No    Does your home have grab bars in the bathroom:    Yes     Does your home have handrails on the stairs?  Yes     Does your home have poorly lit areas?    Yes     Do you feel threatened or controlled by a partner, ex-partner or anyone in your life?   No    Has anyone hurt you physically, for example by pushing, hitting, slapping or kicking you or forcing you to have sex?   No    Do you need help with the phone, transportation, shopping, preparing meals, housework, laundry, medications or managing money?   No    Sexual Health:  Are you sexually active?    No    If yes, with men, women, or both?   N/A    If yes, how many partners?  N/A    If yes, are you using condoms?    N/A    Have you had any sexually transmitted infections in the last year?   No    Do you have any sexual concerns?    No    Women Only:  Women: What year did you stop having periods (approximate age)?  N/A    Women: Any vaginal bleeding in the last year?    N/A    Women: Have you ever had an abnormal Pap smear?    N/A    General Health Assessment:  Have you noticed any hearing difficulties?   No    Do you wear hearing aids?   No    Have you seen a hearing professional such as an audiologist in the last 1 year?   No    Do you have vision difficulty?    No    Do you wear glasses or contacts?   Yes     Have you seen an eye doctor in the last 1 year?   No    How many servings of fruits and vegetables do you eat a day?  Fruit: Pt wrote \"?\"  Vegetables: Pt wrote \"?\"    How often do you exercise in a week?  0    How long and what kind of exercise do you do?  N/A    Tobacco and Alcohol History:  Do you use tobacco/nicotine products?    Pt did not complete    If yes, please list the method of use and average weekly consumption?  Pt did not complete    Do you use any other drugs?   Pt did not complete         Do you drink alcohol?   Pt did not complete    If you drink alcohol, " how many drinks per week?  Pt did not complete    Advance Directive:  Have you completed an Advance Directives document?  Pt did not complete    If yes, have you given a copy to the clinic?   Pt did not complete    Do you need information on Advance Directives?   Pt did not complete    FAIZA Jiang at 2:37 PM on 10/26/2023

## 2023-10-26 NOTE — PROGRESS NOTES
Assessment & Plan     HSV (herpes simplex virus) infection  Helps suppress very well, tolerated  - acyclovir (ZOVIRAX) 200 MG capsule; TAKE 1 CAPSULE 3 TIMES A DAY    Cardiomyopathy, unspecified type (H)  No interval progression  - carvedilol (COREG) 12.5 MG tablet; Take 1 tablet (12.5 mg) by mouth 2 times daily (with meals)  - potassium chloride ER (K-TAB/KLOR-CON) 10 MEQ CR tablet; Take 1 tablet (10 mEq) by mouth daily  - Lipid panel reflex to direct LDL Fasting; Future  - Comprehensive metabolic panel (BMP + Alb, Alk Phos, ALT, AST, Total. Bili, TP); Future  - CBC with platelets and differential; Future  - TSH with free T4 reflex; Future  - Adult Cardiology Eval  Referral; Future    Neck pain  Helps, tolerated, long term use w/o red flags  - cyclobenzaprine (FLEXERIL) 10 MG tablet; Take 1 tablet (10 mg) by mouth 3 times daily as needed for muscle spasms  - butalbital-acetaminophen-caffeine (ESGIC) -40 MG tablet; TAKE 1 TABLET BY MOUTH ONCE DAILY AS NEEDED FOR HEADACHE    Tension headache  Same  - butalbital-acetaminophen-caffeine (ESGIC) -40 MG tablet; TAKE 1 TABLET BY MOUTH ONCE DAILY AS NEEDED FOR HEADACHE    Anxiety state  Very helpful, tolerated  - diazepam (VALIUM) 10 MG tablet; Take 1 tablet (10 mg) by mouth 2 times daily as needed for anxiety TAKE ONE TABLET BY MOUTH TWICE A DAY AS NEEDED FOR ANXIETY    Gastroesophageal reflux disease without esophagitis  Helps a great deal, tolerated  - famotidine (PEPCID) 40 MG tablet; Take 1 tablet (40 mg) by mouth daily    Benign essential hypertension  Same  - furosemide (LASIX) 20 MG tablet; Take 1 tablet (20 mg) by mouth daily  - lisinopril (ZESTRIL) 5 MG tablet; Take 1 tablet (5 mg) by mouth 2 times daily    Chronic systolic congestive heart failure (H)  Stable  - lisinopril (ZESTRIL) 5 MG tablet; Take 1 tablet (5 mg) by mouth 2 times daily    Benign localized prostatic hyperplasia with lower urinary tract symptoms (LUTS)  Helps  - tamsulosin  (FLOMAX) 0.4 MG capsule; Take 1 capsule (0.4 mg) by mouth daily  - PSA, screen; Future    Cardiomyopathy, nonischemic (H)  See cardio      44 minutes spent by me on the date of the encounter doing chart review, history and exam, documentation and further activities per the note      No follow-ups on file.    Donald Lorenz MD  [facility] [place]    Liz Mccarty is a 62 year old, presenting for the following health issues:  Physical and Refill Request      HPI   Cardiac sx stable, not better or worse, he's ok with cardio follow-up if can be virtual   Current meds rvwd  Due for labs  Defers shots and colon screen  Uses acyclorivr for HSV prevention  Anxiety: very well tolerated valium helps, no red flags, long term use  Interval skin ca rvwd I suggest he sees derm yearly  Flomax helps LUTS sx   GERD well managed on tolerated H2  He prefers not to do all the wellness questions    Family History   Problem Relation Age of Onset    Skin Cancer No family hx of      Social History     Tobacco Use    Smoking status: Former     Packs/day: 0.50     Years: 30.00     Additional pack years: 0.00     Total pack years: 15.00     Types: Cigarettes    Smokeless tobacco: Never   Substance Use Topics    Alcohol use: No     Alcohol/week: 0.0 standard drinks of alcohol    Drug use: No       Past Medical History:   Diagnosis Date    Cardiomyopathy, nonischemic (H) 3/23/2018     No past surgical history on file.  Current Outpatient Medications   Medication    acyclovir (ZOVIRAX) 200 MG capsule    Aspirin-Acetaminophen-Caffeine (EXCEDRIN PO)    butalbital-acetaminophen-caffeine (ESGIC) -40 MG tablet    carvedilol (COREG) 12.5 MG tablet    ciclopirox 1 % SHAM    cyclobenzaprine (FLEXERIL) 10 MG tablet    diazepam (VALIUM) 10 MG tablet    famotidine (PEPCID) 40 MG tablet    furosemide (LASIX) 20 MG tablet    hydrocortisone (CORTAID) 1 % external cream    ketoconazole (NIZORAL) 2 % external  cream    lisinopril (ZESTRIL) 5 MG tablet    MOTRIN IB PO    potassium chloride ER (K-TAB/KLOR-CON) 10 MEQ CR tablet    tacrolimus (PROTOPIC) 0.1 % external ointment    tamsulosin (FLOMAX) 0.4 MG capsule    amoxicillin (AMOXIL) 875 MG tablet    ASPIRIN PO    doxycycline hyclate (VIBRAMYCIN) 100 MG capsule     No current facility-administered medications for this visit.     Allergies   Allergen Reactions    Ultram [Tramadol] Nausea    Baclofen     Imitrex [Sumatriptan] Nausea    Ivp Dye [Contrast Dye] Nausea and Vomiting     N & V, eyes, nose, throat swelled as a child    Keflex [Cephalexin Hcl]     Mirtazapine     Valtrex [Valacyclovir] Other (See Comments)     Stomach pains    Celebrex [Celecoxib] Palpitations     No new or interval c/o he has not seen any MD's elsewhere since last at [facility]                  Review of Systems         Objective    /67 (BP Location: Right arm, Patient Position: Sitting, Cuff Size: Adult Regular)   Pulse 76   Wt 82.8 kg (182 lb 8 oz)   SpO2 96%   BMI 25.47 kg/m    Body mass index is 25.47 kg/m .  Physical Exam   GENERAL: healthy, alert and no distress  EYES: Eyes grossly normal to inspection, PERRL and conjunctivae and sclerae normal  HENT: ear canals and TM's normal, nose and mouth without ulcers or lesions  NECK: no adenopathy, no asymmetry, masses, or scars and thyroid normal to palpation  RESP: lungs clear to auscultation - no rales, rhonchi or wheezes  CV: regular rate and rhythm, normal S1 S2, no S3 or S4, no murmur, click or rub, no peripheral edema and peripheral pulses strong  ABDOMEN: soft, nontender, no hepatosplenomegaly, no masses and bowel sounds normal  MS: no gross musculoskeletal defects noted, no edema  SKIN: no suspicious lesions or rashes  NEURO: Normal strength and tone, mentation intact and speech normal  PSYCH: mentation appears normal, affect normal/bright

## 2023-10-26 NOTE — NURSING NOTE
Lul Arriola is a 62 year old male patient that presents today in clinic for the following:    Chief Complaint   Patient presents with    Physical    Refill Request     The patient's allergies and medications were reviewed as noted. A set of vitals were recorded as noted without incident: /67 (BP Location: Right arm, Patient Position: Sitting, Cuff Size: Adult Regular)   Pulse 76   Wt 82.8 kg (182 lb 8 oz)   SpO2 96%   BMI 25.47 kg/m  . The patient does not have any other questions for the provider.    Diaz Dunbar, EMT 12:09 PM on 10/26/2023

## 2024-01-06 ENCOUNTER — HEALTH MAINTENANCE LETTER (OUTPATIENT)
Age: 64
End: 2024-01-06

## 2024-01-25 DIAGNOSIS — F41.1 ANXIETY STATE: ICD-10-CM

## 2024-01-25 RX ORDER — DIAZEPAM 10 MG
10 TABLET ORAL 2 TIMES DAILY PRN
Qty: 60 TABLET | Refills: 1 | Status: SHIPPED | OUTPATIENT
Start: 2024-01-25 | End: 2024-04-15

## 2024-01-25 NOTE — TELEPHONE ENCOUNTER
DIAZEPAM 10MG TABS   Last Written Prescription Date:  10/26/2023  Last Fill Quantity: 60,   # refills: 1  Last Office Visit : 10/26/2023  Future Office visit:  None    Routing refill request to provider for review/approval because:  Drug not on the [facility], [facility] or [facility] refill protocol or controlled substance    Aylin Mathews RN  Central Triage Red Flags/Med Refills

## 2024-04-14 DIAGNOSIS — F41.1 ANXIETY STATE: ICD-10-CM

## 2024-04-15 RX ORDER — DIAZEPAM 10 MG
10 TABLET ORAL 2 TIMES DAILY PRN
Qty: 60 TABLET | Refills: 1 | Status: SHIPPED | OUTPATIENT
Start: 2024-04-15 | End: 2024-07-16

## 2024-04-15 NOTE — TELEPHONE ENCOUNTER
diazepam (VALIUM) 10 MG tablet   Disp-60 tablet, R-1   Start: 01/25/2024     10/26/2023  [facility] Donald Heller MD  Family Medicine       Routed because:  controlled

## 2024-05-08 DIAGNOSIS — M54.2 NECK PAIN: ICD-10-CM

## 2024-05-08 DIAGNOSIS — G44.209 TENSION HEADACHE: ICD-10-CM

## 2024-05-09 NOTE — TELEPHONE ENCOUNTER
butalbital-acetaminophen-caffeine (ESGIC) -40 MG tablet 30 tablet 3 10/26/2023     Last Office Visit: 10/26/23  Future Office visit:   none    Routing refill request to provider for review/approval because:  Drug not on the [facility], [facility] or [facility] refill protocol or controlled substance    Abby Salazar RN  [facility] Red Flag Triage/MRT

## 2024-05-10 RX ORDER — BUTALBITAL, ACETAMINOPHEN AND CAFFEINE 50; 325; 40 MG/1; MG/1; MG/1
TABLET ORAL
Qty: 30 TABLET | Refills: 3 | Status: SHIPPED | OUTPATIENT
Start: 2024-05-10

## 2024-07-14 DIAGNOSIS — F41.1 ANXIETY STATE: ICD-10-CM

## 2024-07-15 NOTE — TELEPHONE ENCOUNTER
diazepam (VALIUM) 10 MG tablet 60 tablet 1 4/15/2024 -- No   Sig - Route: TAKE ONE TABLET BY MOUTH TWICE A DAY AS NEEDED FOR ANXIETY - Oral     ----------------------  Last Office Visit : 10/26/2023  [facility]      Future Office visit:  0  ----------------------      Routing refill request to provider for review/approval because:  Not on refill protocol: CONTROLLED MEDICATION.

## 2024-07-16 RX ORDER — DIAZEPAM 10 MG
10 TABLET ORAL 2 TIMES DAILY PRN
Qty: 60 TABLET | Refills: 1 | Status: SHIPPED | OUTPATIENT
Start: 2024-07-16 | End: 2024-09-24

## 2024-09-24 DIAGNOSIS — F41.1 ANXIETY STATE: ICD-10-CM

## 2024-09-24 RX ORDER — DIAZEPAM 10 MG
10 TABLET ORAL 2 TIMES DAILY PRN
Qty: 60 TABLET | Refills: 1 | Status: SHIPPED | OUTPATIENT
Start: 2024-09-25

## 2024-09-24 NOTE — TELEPHONE ENCOUNTER
diazepam (VALIUM) 10 MG tablet 60 tablet 1 7/16/2024     Last Office Visit: 10/26/23  Future Office visit:   10/29/24    Routing refill request to provider for review/approval because:  Controlled medication    Abby Salazar RN  [facility] Central Nursing/Red Flag Triage & Med Refill Team

## 2024-10-29 ENCOUNTER — OFFICE VISIT (OUTPATIENT)
Dept: FAMILY MEDICINE | Facility: CLINIC | Age: 64
End: 2024-10-29
Payer: MEDICARE

## 2024-10-29 VITALS
SYSTOLIC BLOOD PRESSURE: 134 MMHG | WEIGHT: 188.1 LBS | HEART RATE: 79 BPM | BODY MASS INDEX: 26.25 KG/M2 | RESPIRATION RATE: 16 BRPM | OXYGEN SATURATION: 96 % | DIASTOLIC BLOOD PRESSURE: 80 MMHG

## 2024-10-29 DIAGNOSIS — H92.03 OTALGIA, BILATERAL: ICD-10-CM

## 2024-10-29 DIAGNOSIS — I10 BENIGN ESSENTIAL HYPERTENSION: ICD-10-CM

## 2024-10-29 DIAGNOSIS — F41.1 ANXIETY STATE: ICD-10-CM

## 2024-10-29 DIAGNOSIS — N40.1 BENIGN LOCALIZED PROSTATIC HYPERPLASIA WITH LOWER URINARY TRACT SYMPTOMS (LUTS): ICD-10-CM

## 2024-10-29 DIAGNOSIS — M54.2 NECK PAIN: ICD-10-CM

## 2024-10-29 DIAGNOSIS — Z12.5 ENCOUNTER FOR SCREENING FOR MALIGNANT NEOPLASM OF PROSTATE: ICD-10-CM

## 2024-10-29 DIAGNOSIS — G44.209 TENSION HEADACHE: ICD-10-CM

## 2024-10-29 DIAGNOSIS — R21 RASH: ICD-10-CM

## 2024-10-29 DIAGNOSIS — B00.9 HSV (HERPES SIMPLEX VIRUS) INFECTION: ICD-10-CM

## 2024-10-29 DIAGNOSIS — Z00.00 ENCOUNTER FOR MEDICARE ANNUAL WELLNESS EXAM: Primary | ICD-10-CM

## 2024-10-29 DIAGNOSIS — I42.9 CARDIOMYOPATHY, UNSPECIFIED TYPE (H): ICD-10-CM

## 2024-10-29 DIAGNOSIS — I50.22 CHRONIC SYSTOLIC CONGESTIVE HEART FAILURE (H): ICD-10-CM

## 2024-10-29 DIAGNOSIS — K21.9 GASTROESOPHAGEAL REFLUX DISEASE WITHOUT ESOPHAGITIS: ICD-10-CM

## 2024-10-29 PROCEDURE — G0439 PPPS, SUBSEQ VISIT: HCPCS | Performed by: FAMILY MEDICINE

## 2024-10-29 RX ORDER — CYCLOBENZAPRINE HCL 10 MG
10 TABLET ORAL 3 TIMES DAILY PRN
Qty: 90 TABLET | Refills: 3 | Status: SHIPPED | OUTPATIENT
Start: 2024-10-29

## 2024-10-29 RX ORDER — LISINOPRIL 5 MG/1
5 TABLET ORAL 2 TIMES DAILY
Qty: 180 TABLET | Refills: 3 | Status: SHIPPED | OUTPATIENT
Start: 2024-10-29

## 2024-10-29 RX ORDER — TAMSULOSIN HYDROCHLORIDE 0.4 MG/1
0.4 CAPSULE ORAL DAILY
Qty: 90 CAPSULE | Refills: 3 | Status: SHIPPED | OUTPATIENT
Start: 2024-10-29

## 2024-10-29 RX ORDER — AMOXICILLIN 875 MG/1
875 TABLET, COATED ORAL 2 TIMES DAILY
Qty: 20 TABLET | Refills: 0 | Status: SHIPPED | OUTPATIENT
Start: 2024-10-29

## 2024-10-29 RX ORDER — CARVEDILOL 12.5 MG/1
12.5 TABLET ORAL 2 TIMES DAILY WITH MEALS
Qty: 180 TABLET | Refills: 3 | Status: SHIPPED | OUTPATIENT
Start: 2024-10-29

## 2024-10-29 RX ORDER — FUROSEMIDE 20 MG/1
20 TABLET ORAL DAILY
Qty: 90 TABLET | Refills: 3 | Status: SHIPPED | OUTPATIENT
Start: 2024-10-29

## 2024-10-29 RX ORDER — BUTALBITAL, ACETAMINOPHEN AND CAFFEINE 50; 325; 40 MG/1; MG/1; MG/1
TABLET ORAL
Qty: 30 TABLET | Refills: 3 | Status: SHIPPED | OUTPATIENT
Start: 2024-10-29

## 2024-10-29 RX ORDER — FAMOTIDINE 40 MG/1
40 TABLET, FILM COATED ORAL DAILY
Qty: 90 TABLET | Refills: 3 | Status: SHIPPED | OUTPATIENT
Start: 2024-10-29

## 2024-10-29 RX ORDER — BENZOCAINE/MENTHOL 6 MG-10 MG
LOZENGE MUCOUS MEMBRANE 2 TIMES DAILY
Qty: 60 G | Refills: 3 | Status: SHIPPED | OUTPATIENT
Start: 2024-10-29

## 2024-10-29 RX ORDER — ACYCLOVIR 200 MG/1
CAPSULE ORAL
Qty: 270 CAPSULE | Refills: 3 | Status: SHIPPED | OUTPATIENT
Start: 2024-10-29

## 2024-10-29 RX ORDER — DIAZEPAM 10 MG/1
10 TABLET ORAL EVERY 8 HOURS PRN
Qty: 90 TABLET | Refills: 3 | Status: SHIPPED | OUTPATIENT
Start: 2024-10-29

## 2024-10-29 NOTE — PROGRESS NOTES
Preventive Care Visit  [facility]  Donald Lorenz MD, Family Medicine  Oct 29, 2024      Assessment & Plan     Cardiomyopathy, unspecified type (H)  Cont current meds. I encourage follow-up cardio, reviewed last cardio note.  - Lipid panel reflex to direct LDL Non-fasting; Future  - CBC with Platelets; Future  - carvedilol (COREG) 12.5 MG tablet; Take 1 tablet (12.5 mg) by mouth 2 times daily (with meals).  - Comprehensive metabolic panel (BMP + Alb, Alk Phos, ALT, AST, Total. Bili, TP); Future  - TSH with free T4 reflex; Future  - Adult Cardiology Eval  Referral; Future    Otalgia, bilateral  Carries in case recurs, very helpful in past  - amoxicillin (AMOXIL) 875 MG tablet; Take 1 tablet (875 mg) by mouth 2 times daily.    Neck pain  Helps, tolerated, does not want further eval,  rvwd  - cyclobenzaprine (FLEXERIL) 10 MG tablet; Take 1 tablet (10 mg) by mouth 3 times daily as needed for muscle spasms.  - butalbital-acetaminophen-caffeine (ESGIC) -40 MG tablet; TAKE 1 TABLET BY MOUTH ONCE DAILY AS NEEDED FOR HEADACHE    Anxiety state  Helps. Discussed at length.  rvwd. Less effective than it used to be but helpful. Tolerated. Chronic use. He finds if tries to use less, anxiety worse and HSV flares, even w/ HSV prophy med  - diazepam (VALIUM) 10 MG tablet; Take 1 tablet (10 mg) by mouth every 8 hours as needed for anxiety.    HSV (herpes simplex virus) infection  Helps, tolerated, flares w/o  - acyclovir (ZOVIRAX) 200 MG capsule; TAKE 1 CAPSULE 3 TIMES A DAY    Gastroesophageal reflux disease without esophagitis  No recent sx, tolerated  - famotidine (PEPCID) 40 MG tablet; Take 1 tablet (40 mg) by mouth daily.    Benign essential hypertension    - furosemide (LASIX) 20 MG tablet; Take 1 tablet (20 mg) by mouth daily.  - TSH with free T4 reflex; Future  - lisinopril (ZESTRIL) 5 MG tablet; Take 1 tablet (5 mg) by mouth 2 times daily.    Rash  Helps  his seb derm, tolerated  - hydrocortisone (CORTAID) 1 % external cream; Apply topically 2 times daily.    Tension headache  Uses sparingly,  rvwd  - butalbital-acetaminophen-caffeine (ESGIC) -40 MG tablet; TAKE 1 TABLET BY MOUTH ONCE DAILY AS NEEDED FOR HEADACHE    Chronic systolic congestive heart failure (H)  COntinue  - lisinopril (ZESTRIL) 5 MG tablet; Take 1 tablet (5 mg) by mouth 2 times daily.    Benign localized prostatic hyperplasia with lower urinary tract symptoms (LUTS)  Due, Flomax helps BPh sx  - PSA, screen; Future  - tamsulosin (FLOMAX) 0.4 MG capsule; Take 1 capsule (0.4 mg) by mouth daily.    Encounter for screening for malignant neoplasm of prostate    - PSA, screen; Future              No follow-ups on file.    Liz Mccarty is a 63 year old, presenting for the following:  Physical and Medication Refill        10/29/2024    11:06 AM   Additional Questions   Roomed by MR SPAULDING  Pt had colonoscopy 2018 wnl, no GI sx  Pt due for labs  No new cardio sx, still limited greatly by exertional cardiac sx w/ minimal exertion, I encourage follow-up w/ cardiology  Defers any immunization  No interval history since last saw me  Does see community eye doctor yearly on his own  Past Medical History:   Diagnosis Date    Cardiomyopathy, nonischemic (H) 3/23/2018     No past surgical history on file.  Current Outpatient Medications   Medication Sig Dispense Refill    acyclovir (ZOVIRAX) 200 MG capsule TAKE 1 CAPSULE 3 TIMES A  capsule 3    amoxicillin (AMOXIL) 875 MG tablet Take 1 tablet (875 mg) by mouth 2 times daily. 20 tablet 0    Aspirin-Acetaminophen-Caffeine (EXCEDRIN PO) Take by mouth as needed      butalbital-acetaminophen-caffeine (ESGIC) -40 MG tablet TAKE 1 TABLET BY MOUTH ONCE DAILY AS NEEDED FOR HEADACHE 30 tablet 3    carvedilol (COREG) 12.5 MG tablet Take 1 tablet (12.5 mg) by mouth 2 times daily (with meals). 180 tablet 3    ciclopirox 1 % SHAM Externally apply  topically twice a week 120 mL 3    cyclobenzaprine (FLEXERIL) 10 MG tablet Take 1 tablet (10 mg) by mouth 3 times daily as needed for muscle spasms. 90 tablet 3    diazepam (VALIUM) 10 MG tablet Take 1 tablet (10 mg) by mouth every 8 hours as needed for anxiety. 90 tablet 3    famotidine (PEPCID) 40 MG tablet Take 1 tablet (40 mg) by mouth daily. 90 tablet 3    furosemide (LASIX) 20 MG tablet Take 1 tablet (20 mg) by mouth daily. 90 tablet 3    hydrocortisone (CORTAID) 1 % external cream Apply topically 2 times daily. 60 g 3    ketoconazole (NIZORAL) 2 % external cream Apply twice daily as needed for rash on the face 60 g 11    lisinopril (ZESTRIL) 5 MG tablet Take 1 tablet (5 mg) by mouth 2 times daily. 180 tablet 3    potassium chloride ER (K-TAB/KLOR-CON) 10 MEQ CR tablet Take 1 tablet (10 mEq) by mouth daily 90 tablet 3    tacrolimus (PROTOPIC) 0.1 % external ointment Apply twice daily as needed for rash on the face 60 g 11    tamsulosin (FLOMAX) 0.4 MG capsule Take 1 capsule (0.4 mg) by mouth daily. 90 capsule 3    ASPIRIN PO Take 81 mg by mouth daily (Patient not taking: Reported on 10/29/2024)      doxycycline hyclate (VIBRAMYCIN) 100 MG capsule Take 1 capsule (100 mg) by mouth 2 times daily (Patient not taking: Reported on 10/29/2024) 20 capsule 0    MOTRIN IB PO Take by mouth as needed for moderate pain (Patient not taking: Reported on 10/29/2024)       No current facility-administered medications for this visit.     Allergies   Allergen Reactions    Ultram [Tramadol] Nausea    Baclofen     Imitrex [Sumatriptan] Nausea    Ivp Dye [Contrast Dye] Nausea and Vomiting     N & V, eyes, nose, throat swelled as a child    Keflex [Cephalexin Hcl]     Mirtazapine     Valtrex [Valacyclovir] Other (See Comments)     Stomach pains    Celebrex [Celecoxib] Palpitations     Family History   Problem Relation Age of Onset    Skin Cancer No family hx of      Social History     Socioeconomic History    Marital status: Single      Spouse name: Not on file    Number of children: Not on file    Years of education: Not on file    Highest education level: Not on file   Occupational History    Not on file   Tobacco Use    Smoking status: Former     Current packs/day: 0.50     Average packs/day: 0.5 packs/day for 30.0 years (15.0 ttl pk-yrs)     Types: Cigarettes    Smokeless tobacco: Never   Substance and Sexual Activity    Alcohol use: No     Alcohol/week: 0.0 standard drinks of alcohol    Drug use: No    Sexual activity: Not on file   Other Topics Concern    Parent/sibling w/ CABG, MI or angioplasty before 65F 55M? Not Asked   Social History Narrative    Not on file     Social Drivers of Health     Financial Resource Strain: Not on file   Food Insecurity: Not on file   Transportation Needs: Not on file   Physical Activity: Not on file   Stress: Not on file   Social Connections: Not on file   Interpersonal Safety: Low Risk  (10/29/2024)    Interpersonal Safety     Do you feel physically and emotionally safe where you currently live?: Yes     Within the past 12 months, have you been hit, slapped, kicked or otherwise physically hurt by someone?: No     Within the past 12 months, have you been humiliated or emotionally abused in other ways by your partner or ex-partner?: No   Housing Stability: Not on file          No data to display                   No data to display                   No data to display                      No data to display                   No data to display                   No data to display                     No data to display                          No data to display              Social History     Tobacco Use    Smoking status: Former     Current packs/day: 0.50     Average packs/day: 0.5 packs/day for 30.0 years (15.0 ttl pk-yrs)     Types: Cigarettes    Smokeless tobacco: Never   Substance Use Topics    Alcohol use: No     Alcohol/week: 0.0 standard drinks of alcohol    Drug use: No       Last PSA:   PSA    Date Value Ref Range Status   09/11/2020 0.58 0 - 4 ug/L Final     Comment:     Assay Method:  Chemiluminescence using Siemens Vista analyzer     Prostate Specific Antigen Screen   Date Value Ref Range Status   10/26/2023 0.78 0.00 - 4.50 ng/mL Final   09/17/2021 0.50 0.00 - 4.00 ug/L Final     ASCVD Risk   The 10-year ASCVD risk score (Evelyn STARKEY, et al., 2019) is: 16.4%    Values used to calculate the score:      Age: 63 years      Sex: Male      Is Non- : No      Diabetic: No      Tobacco smoker: No      Systolic Blood Pressure: 134 mmHg      Is BP treated: Yes      HDL Cholesterol: 37 mg/dL      Total Cholesterol: 216 mg/dL      Reviewed and updated as needed this visit by Provider                      Current providers sharing in care for this patient include:  Patient Care Team:  Donald Lorenz MD as PCP - General (Family Practice)  Mary Jane Macario APRN CNS as Nurse Practitioner (Clinical Nurse Specialist)  Blayne Alvarez MD as MD (Dermatology)  Tima Montenegro MD as MD (Dermatology)  Donald Lorenz MD as Assigned PCP  Tima Montenegro MD as MD (Dermatology)  Marlon Barth MD as Assigned Surgical Provider    The following health maintenance items are reviewed in Epic and correct as of today:  Health Maintenance   Topic Date Due    HF ACTION PLAN  Never done    ANNUAL REVIEW OF HM ORDERS  Never done    ADVANCE CARE PLANNING  Never done    DEPRESSION ACTION PLAN  Never done    DTAP/TDAP/TD IMMUNIZATION (1 - Tdap) Never done    ZOSTER IMMUNIZATION (1 of 2) Never done    LUNG CANCER SCREENING  Never done    RSV VACCINE (1 - Risk 60-74 years 1-dose series) Never done    PHQ-9  05/10/2022    MEDICARE ANNUAL WELLNESS VISIT  09/17/2022    BMP  04/26/2024    INFLUENZA VACCINE (1) Never done    COVID-19 Vaccine (1 - 2024-25 season) Never done    ALT  10/26/2024    LIPID  10/26/2024    CBC  10/26/2024    GLUCOSE  10/26/2026    COLORECTAL CANCER  "SCREENING  04/12/2028    TSH W/FREE T4 REFLEX  Completed    HEPATITIS C SCREENING  Completed    HIV SCREENING  Completed    Pneumococcal Vaccine: Pediatrics (0 to 5 Years) and At-Risk Patients (6 to 64 Years)  Aged Out    HPV IMMUNIZATION  Aged Out    MENINGITIS IMMUNIZATION  Aged Out    RSV MONOCLONAL ANTIBODY  Aged Out            Objective    Exam  /80 (BP Location: Right arm, Patient Position: Sitting, Cuff Size: Adult Regular)   Pulse 79   Resp 16   Wt 85.3 kg (188 lb 1.6 oz)   SpO2 96%   BMI 26.25 kg/m     Estimated body mass index is 26.25 kg/m  as calculated from the following:    Height as of 10/19/22: 1.803 m (5' 10.98\").    Weight as of this encounter: 85.3 kg (188 lb 1.6 oz).    Physical Exam  GENERAL: alert and no distress  EYES: Eyes grossly normal to inspection, PERRL and conjunctivae and sclerae normal  HENT: ear canals and TM's normal, nose and mouth without ulcers or lesions  NECK: no adenopathy, no asymmetry, masses, or scars  RESP: lungs clear to auscultation - no rales, rhonchi or wheezes  CV: regular rate and rhythm, normal S1 S2, no S3 or S4, 3/6 murmur, click or rub, no peripheral edema  ABDOMEN: soft, nontender, no hepatosplenomegaly, no masses and bowel sounds normal  MS: no gross musculoskeletal defects noted, no edema  SKIN: no suspicious lesions or rashes  NEURO: Normal strength and tone, mentation intact and speech normal  PSYCH: mentation appears normal, affect normal/bright  Normal penis testes, R hydrocele, chronic, nontender, no inguinal hernia        10/29/2024   Mini Cog   Mini-Cog Not Completed (choose reason) Patient declines               Signed Electronically by: Donald Lorenz MD    "

## 2024-10-29 NOTE — PATIENT INSTRUCTIONS
Patient Education   Preventive Care Advice   This is general advice given by our system to help you stay healthy. However, your care team may have specific advice just for you. Please talk to your care team about your preventive care needs.  Nutrition  Eat 5 or more servings of fruits and vegetables each day.  Try wheat bread, brown rice and whole grain pasta (instead of white bread, rice, and pasta).  Get enough calcium and vitamin D. Check the label on foods and aim for 100% of the RDA (recommended daily allowance).  Lifestyle  Exercise at least 150 minutes each week  (30 minutes a day, 5 days a week).  Do muscle strengthening activities 2 days a week. These help control your weight and prevent disease.  No smoking.  Wear sunscreen to prevent skin cancer.  Have a dental exam and cleaning every 6 months.  Yearly exams  See your health care team every year to talk about:  Any changes in your health.  Any medicines your care team has prescribed.  Preventive care, family planning, and ways to prevent chronic diseases.  Shots (vaccines)   HPV shots (up to age 26), if you've never had them before.  Hepatitis B shots (up to age 59), if you've never had them before.  COVID-19 shot: Get this shot when it's due.  Flu shot: Get a flu shot every year.  Tetanus shot: Get a tetanus shot every 10 years.  Pneumococcal, hepatitis A, and RSV shots: Ask your care team if you need these based on your risk.  Shingles shot (for age 50 and up)  General health tests  Diabetes screening:  Starting at age 35, Get screened for diabetes at least every 3 years.  If you are younger than age 35, ask your care team if you should be screened for diabetes.  Cholesterol test: At age 39, start having a cholesterol test every 5 years, or more often if advised.  Bone density scan (DEXA): At age 50, ask your care team if you should have this scan for osteoporosis (brittle bones).  Hepatitis C: Get tested at least once in your life.  STIs (sexually  transmitted infections)  Before age 24: Ask your care team if you should be screened for STIs.  After age 24: Get screened for STIs if you're at risk. You are at risk for STIs (including HIV) if:  You are sexually active with more than one person.  You don't use condoms every time.  You or a partner was diagnosed with a sexually transmitted infection.  If you are at risk for HIV, ask about PrEP medicine to prevent HIV.  Get tested for HIV at least once in your life, whether you are at risk for HIV or not.  Cancer screening tests  Cervical cancer screening: If you have a cervix, begin getting regular cervical cancer screening tests starting at age 21.  Breast cancer scan (mammogram): If you've ever had breasts, begin having regular mammograms starting at age 40. This is a scan to check for breast cancer.  Colon cancer screening: It is important to start screening for colon cancer at age 45.  Have a colonoscopy test every 10 years (or more often if you're at risk) Or, ask your provider about stool tests like a FIT test every year or Cologuard test every 3 years.  To learn more about your testing options, visit:   .  For help making a decision, visit:   https://bit.ly/xa18355.  Prostate cancer screening test: If you have a prostate, ask your care team if a prostate cancer screening test (PSA) at age 55 is right for you.  Lung cancer screening: If you are a current or former smoker ages 50 to 80, ask your care team if ongoing lung cancer screenings are right for you.  For informational purposes only. Not to replace the advice of your health care provider. Copyright   2023 [employer]. All rights reserved. Clinically reviewed by the [facility] Transitions Program. Hive[employer] [id number] - REV 01/24.

## 2024-10-30 ENCOUNTER — TELEPHONE (OUTPATIENT)
Dept: FAMILY MEDICINE | Facility: CLINIC | Age: 64
End: 2024-10-30
Payer: MEDICARE

## 2024-10-30 NOTE — TELEPHONE ENCOUNTER
[facility] Call Center    Phone Message    May a detailed message be left on voicemail: yes     Reason for Call: Other: Patient calling requesting labs faxed to an outside clinic due to him being out of town. Patient requesting labs faxed to [facility] fax number is [phone] Please call patient to inform when labs orders sent.     Action Taken: Message routed to:  [facility]    Travel Screening: Not Applicable     Date of Service:

## 2024-10-30 NOTE — TELEPHONE ENCOUNTER
Lab orders faxed to [place] at fax # [phone]. Patient contacted to inform him of this. He stated that was not what he told the call center, he wanted the lab orders faxed with the instructions to call him when they received them. Informed patient of TE we received and that the orders have been faxed. Patient stated he will call the lab.  Sangeetha COONEY LPN  [facility]

## 2025-05-04 DIAGNOSIS — F41.1 ANXIETY STATE: ICD-10-CM

## 2025-05-05 NOTE — TELEPHONE ENCOUNTER
Last Written Prescription:  diazepam (VALIUM) 10 MG tablet 90 tablet 3 10/29/2024 -- No   Sig - Route: Take 1 tablet (10 mg) by mouth every 8 hours as needed for anxiety. - Oral   Sent to pharmacy as: diazePAM 10 MG Oral Tablet (VALIUM)   Class: E-Prescribe     ----------------------  Last Visit Date: 10/29/24  Future Visit Date: 0  ----------------------      Refill decision: Medication unable to be refilled by RN due to: Controlled medication and Medication not included in refill protocol policy         Request from pharmacy:  Requested Prescriptions   Pending Prescriptions Disp Refills    diazepam (VALIUM) 10 MG tablet [Pharmacy Med Name: DIAZEPAM 10MG TABS] 90 tablet 3     Sig: TAKE ONE TABLET BY MOUTH EVERY 8 HOURS AS NEEDED FOR ANXIETY       Rx Protocol Controlled Substance Failed - 5/5/2025  4:54 PM        Failed - Visit with relevant provider in past 3 months or upcoming 3 months        Failed - Urine drug screeen results on file in past 12 months     [unfilled]           Failed - Controlled Substance Agreement on file in last 12 months     Please review last Controlled Substance Pain agreement document.   CSA -- Encounter Level:    CSA: None found at the encounter level.       CSA -- Patient Level:    CSA: None found at the patient level.               Failed - Auto Fail - Please forward to Provider        Passed - Medication is active on med list and the sig matches        Passed - Medication not refilled in past 28 days     Invalid Medication Grouper          Passed - No Opioids on active med list

## 2025-05-06 RX ORDER — DIAZEPAM 10 MG/1
10 TABLET ORAL EVERY 8 HOURS PRN
Qty: 90 TABLET | Refills: 0 | Status: SHIPPED | OUTPATIENT
Start: 2025-05-06

## 2025-05-06 NOTE — TELEPHONE ENCOUNTER
Controlled substance refill request notes    Refill request received for: Diazepam 10 Mg Tablet  [place]  data reviewed 05/06/25:  Medication last refill: qty 90, 30 day supply, filled/sold to patient on 03/24/2025  Pended order: Diazepam 10 Mg Tablet, qty 90, 30 day supply, no delay in fill date     Primary care provider: Donald Lorenz  Last office visit with this department: 10/29/2024  Next appointment with PCP: None    Refill request forwarded to provider for review.     Sangeetha COONEY LPN  [facility]

## 2025-06-22 DIAGNOSIS — G44.209 TENSION HEADACHE: ICD-10-CM

## 2025-06-22 DIAGNOSIS — M54.2 NECK PAIN: ICD-10-CM

## 2025-06-25 RX ORDER — BUTALBITAL, ACETAMINOPHEN AND CAFFEINE 50; 325; 40 MG/1; MG/1; MG/1
TABLET ORAL
Qty: 30 TABLET | Refills: 0 | Status: SHIPPED | OUTPATIENT
Start: 2025-06-25

## 2025-06-25 NOTE — TELEPHONE ENCOUNTER
Last Written Prescription:      Disp Refills Start End DAW   butalbital-acetaminophen-caffeine (ESGIC) -40 MG tablet 30 tablet 3 10/29/2024 -- No   Sig: TAKE 1 TABLET BY MOUTH ONCE DAILY AS NEEDED FOR HEADACHE   Class: Local Print     ----------------------   Last Visit Date: 10/29/24 [facility]  Future Visit Date: 0  ----------------------           Refill decision: Medication unable to be refilled by RN due to: Controlled medication butalbital-acetaminophen-caffeine (ESGIC) -40 MG tablet        Request from pharmacy:  Requested Prescriptions   Pending Prescriptions Disp Refills    butalbital-acetaminophen-caffeine (ESGIC) -40 MG tablet [Pharmacy Med Name: Butalbital-APAP-Caffeine -40 MG Oral Tablet] 30 tablet 0     Sig: TAKE 1 TABLET BY MOUTH ONCE DAILY AS NEEDED FOR HEADACHE       There is no refill protocol information for this order

## 2025-06-25 NOTE — TELEPHONE ENCOUNTER
Controlled substance refill request notes    Refill request received for: Fpcogs-Qhtvbgok-Otvq -40  [place]  data reviewed 06/25/25:  Medication last refill: qty 30, 30 day supply, filled/sold to patient on 04/23/2025  Pended order: Vuwtoa-Wqjqlvem-Bfrx -40, qty 30, 30 day supply, no delay in fill date     Primary care provider: Donald Lorenz  Last office visit with this department: 10/29/2024  Next appointment with PCP: none     Refill request forwarded to provider for review.     Sangeetha COONEY LPN  [facility]

## 2025-08-17 DIAGNOSIS — F41.1 ANXIETY STATE: ICD-10-CM

## 2025-08-19 RX ORDER — DIAZEPAM 10 MG/1
10 TABLET ORAL EVERY 8 HOURS PRN
Qty: 90 TABLET | Refills: 0 | Status: SHIPPED | OUTPATIENT
Start: 2025-08-19

## 2025-08-25 DIAGNOSIS — M54.2 NECK PAIN: ICD-10-CM

## 2025-08-25 DIAGNOSIS — G44.209 TENSION HEADACHE: ICD-10-CM

## 2025-08-27 RX ORDER — BUTALBITAL, ACETAMINOPHEN AND CAFFEINE 50; 325; 40 MG/1; MG/1; MG/1
TABLET ORAL
Qty: 30 TABLET | Refills: 2 | Status: SHIPPED | OUTPATIENT
Start: 2025-08-27